# Patient Record
Sex: FEMALE | Race: WHITE | NOT HISPANIC OR LATINO | Employment: OTHER | URBAN - METROPOLITAN AREA
[De-identification: names, ages, dates, MRNs, and addresses within clinical notes are randomized per-mention and may not be internally consistent; named-entity substitution may affect disease eponyms.]

---

## 2017-03-01 ENCOUNTER — OFFICE VISIT (OUTPATIENT)
Dept: INTERNAL MEDICINE | Facility: IMAGING CENTER | Age: 72
End: 2017-03-01
Payer: COMMERCIAL

## 2017-03-01 VITALS
RESPIRATION RATE: 14 BRPM | HEIGHT: 64 IN | WEIGHT: 100 LBS | HEART RATE: 78 BPM | DIASTOLIC BLOOD PRESSURE: 60 MMHG | SYSTOLIC BLOOD PRESSURE: 90 MMHG | TEMPERATURE: 98.6 F | OXYGEN SATURATION: 95 % | BODY MASS INDEX: 17.07 KG/M2

## 2017-03-01 DIAGNOSIS — S46.912A LEFT SHOULDER STRAIN, INITIAL ENCOUNTER: Primary | ICD-10-CM

## 2017-03-01 DIAGNOSIS — R41.3 MEMORY DISORDER: ICD-10-CM

## 2017-03-01 PROCEDURE — G8482 FLU IMMUNIZE ORDER/ADMIN: HCPCS | Performed by: FAMILY MEDICINE

## 2017-03-01 PROCEDURE — G8419 CALC BMI OUT NRM PARAM NOF/U: HCPCS | Performed by: FAMILY MEDICINE

## 2017-03-01 PROCEDURE — 1036F TOBACCO NON-USER: CPT | Performed by: FAMILY MEDICINE

## 2017-03-01 PROCEDURE — G8432 DEP SCR NOT DOC, RNG: HCPCS | Performed by: FAMILY MEDICINE

## 2017-03-01 PROCEDURE — 1101F PT FALLS ASSESS-DOCD LE1/YR: CPT | Performed by: FAMILY MEDICINE

## 2017-03-01 PROCEDURE — 4040F PNEUMOC VAC/ADMIN/RCVD: CPT | Performed by: FAMILY MEDICINE

## 2017-03-01 PROCEDURE — G8598 ASA/ANTIPLAT THER USED: HCPCS | Performed by: FAMILY MEDICINE

## 2017-03-01 PROCEDURE — 3017F COLORECTAL CA SCREEN DOC REV: CPT | Performed by: FAMILY MEDICINE

## 2017-03-01 PROCEDURE — 3014F SCREEN MAMMO DOC REV: CPT | Performed by: FAMILY MEDICINE

## 2017-03-01 PROCEDURE — 99214 OFFICE O/P EST MOD 30 MIN: CPT | Performed by: FAMILY MEDICINE

## 2017-03-01 RX ORDER — MIRTAZAPINE 15 MG/1
15 TABLET, FILM COATED ORAL NIGHTLY
COMMUNITY
End: 2017-06-01

## 2017-03-01 NOTE — PATIENT INSTRUCTIONS
Current Outpatient Prescriptions Ordered in [facility]   Medication Sig Dispense Refill   • mirtazapine (REMERON) 15 MG Tab Take 15 mg by mouth every evening.     • L-Methylfolate 1 MG TABS Take 1 Tab by mouth every day.     • Cyanocobalamin (B-12) 1000 MCG TBCR Take 1 Each by mouth every day.     • aspirin EC (ECOTRIN) 81 MG TBEC Take 1 Tab by mouth every day.     • NON SPECIFIED Take 1 Each by mouth 2 Times a Day. Omega 3     • multivitamin (THERAGRAN) TABS Take 1 Tab by mouth every day. 30 Tab    • Coenzyme Q10 (CO Q-10) 100 MG CAPS Take 1 Cap by mouth every day.     • Cholecalciferol (VITAMIN D) 1000 UNIT CAPS Take 1 Cap by mouth every day.       No current [facility]-ordered facility-administered medications on file.

## 2017-03-01 NOTE — PROGRESS NOTES
SUBJECTIVE:    Chief Complaint   Patient presents with   • Shoulder Pain     left x 2 months       Shirley Coffey is a 72 y.o. female,   Established Patient     PROBLEM #1-HISTORY OF PRESENT ILLNESS  New Problem  PATIENT STATEMENT OF PROBLEM - L shoulder pain  ONSET - 2 months  COURSE - worse with overhead work and certain other movements, which will cause sharp L shoulder pain.  Pain lasts seconds.   INTENSITY/STATUS/LOCATION/RADIATION - moderate/intermittently present/ as above/ no   AGGRAVATORS - as above  RELIEVERS - rest, avoidance  TREATMENTS/COMPLIANCE/@GOAL? - none/ na/ no     PROBLEM #2-HISTORY OF PRESENT ILLNESS  Existing Problem, but requiring re-evaluation  PATIENT STATEMENT OF PROBLEM - impaired memory  ONSET - years  COURSE - this continues to be problematic. She is seeing a Neurologist who introduce PM Remeron a month or so ago.  Patient is unsure if this is helping?   INTENSITY/STATUS/LOCATION/RADIATION - moderate/ slowly worsening  AGGRAVATORS - ?  RELIEVERS - ?  TREATMENTS/COMPLIANCE/@GOAL? 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     • L-Methylfolate 1 MG TABS Take 1 Tab by mouth every day.     • Cyanocobalamin (B-12) 1000 MCG TBCR Take 1 Each by mouth every day.     • aspirin EC (ECOTRIN) 81 MG TBEC Take 1 Tab by mouth every day.     • NON SPECIFIED Take 1 Each by mouth 2 Times a Day. Omega 3     • multivitamin (THERAGRAN) TABS Take 1 Tab by mouth every day. 30 Tab    • Coenzyme Q10 (CO Q-10) 100 MG CAPS Take 1 Cap by mouth every day.     • Cholecalciferol (VITAMIN D) 1000 UNIT CAPS Take 1 Cap by mouth every day.       No facility-administered encounter medications on file as of 3/1/2017.       Social History   Substance Use Topics   • Smoking status: Never Smoker    • Smokeless tobacco: Never Used   • Alcohol Use: No       Family History   Problem Relation Age of Onset   • Dementia Father    • Cancer Sister 75     Breast Cancer       Patient's Past, Social, and Family History reviewed and updated by me in EPIC today.    REVIEW OF SYMPTOMS:               Pertinent Positives as above.    All other systems reviewed and negative.     OBJECTIVE:    BP 90/60 mmHg  Pulse 78  Temp(Src) 37 °C (98.6 °F)  Resp 14  Ht 1.626 m (5' 4.02\")  Wt 45.36 kg (100 lb)  BMI 17.16 kg/m2  SpO2 95%  Body mass index is 17.16 kg/(m^2).    Well developed, well nourished female, no acute distress, non-ill appearing. Comfortable, appears stated age, pleasant and cooperative  HEAD: atraumatic, normocephalic   EYES: Conjunctiva normal, EOMI, PERRLA, acuity grossly intact.   EARS/NOSE/THROAT: TM's normal, no SSX of infection, no perforation, no hemotympanum, acuity grossly intact. Oropharynx: benign, no lesions noted. Nares: benign.   NECK: supple, no adenopathy, no thyromegaly or nodules, no JVD, no carotid bruits.   CHEST/LUNGS: clear to auscultation and percussion bilaterally. No adventitious breath sounds.   CARDIOVASCULAR: regular " rate and rhythm, no murmur. PMI not displaced. Good central and peripheral pulses.   BACK: no CVA tenderness.   ABDOMEN: soft, non-tender, non-distended, no masses, no hepatosplenomegaly. Normal active bowel tones.   : deferred.   Rectal: deferred.   Extremities: warm/well-perfused, no cyanosis, clubbing, or edema. I was able to elicit L shoulder CC pain with anterior/superior shoulder palpation pressure and PROM movements.   SKIN: clear, unbroken, no rashes, normal turgor.   Neuro: Mental Status: Alert and Oriented x 3, but slightly slowed mentation (chronic). CN II-XII grossly intact. Gait normal. Non-focal, intact. Normal strength, sensation    ASSESSMENT:    1. Left shoulder strain, initial encounter     2. Memory disorder         PLAN:    Total Face-to-Face time spent with patient: 30 minutes  Amount of time spent counseling patient and/or coordinating care: 20 minutes    The nature of patient counseling as below:  -Patient Education, including below topics:  -Differential Diagnoses and treatment options discussed  -Risks, benefits, alternatives discussed  -Therapeutic Lifestyle Changes discussed    The nature of coordination of care as below:  -Continue (other) present chronic medications  -PHYSICAL THERAPY evaluation and treatment (Order given)  -Referrals: Continue Neurology care  -Other: none  -Seek medical attention immediately if worse    FOLLOW-UP:  For Health Care Maintenance Exams  And as needed.

## 2017-03-01 NOTE — MR AVS SNAPSHOT
"        Shirley Coffey   3/1/2017 2:30 PM   Office Visit   MRN: [id number]    Department:  [facility]   Dept Phone:  [phone]    Description:  Female : 1945   Provider:  Jesús Ramsay M.D.           Reason for Visit     Shoulder Pain left x 2 months      Allergies as of 3/1/2017     No Known Allergies      You were diagnosed with     Left shoulder strain, initial encounter   [457629]  -  Primary     Memory disorder   [[id number]]         Vital Signs     Blood Pressure Pulse Temperature Respirations Height Weight    90/60 mmHg 78 37 °C (98.6 °F) 14 1.626 m (5' 4.02\") 45.36 kg (100 lb)    Body Mass Index Oxygen Saturation Smoking Status             17.16 kg/m2 95% Never Smoker          Basic Information     Date Of Birth Sex Race Ethnicity Preferred Language    1945 Female White Non- English      Problem List              ICD-10-CM Priority Class Noted - Resolved    Dyslipidemia E78.5   2013 - Present    Macular pucker, left eye H35.372   Unknown - Present    Mixed hyperlipidemia with apolipoprotein E4 variant E78.2   Unknown - Present    Atherosclerosis of both carotid arteries I65.23   Unknown - Present    Eustachian tube dysfunction H69.80   10/28/2013 - Present    Postmenopausal disorder N95.9   10/28/2013 - Present    Vitamin D deficiency disease E55.9   10/28/2013 - Present    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 R79.89   10/31/2013 - Present    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 R79.89   10/31/2013 - Present    Inflammation of arteries (CMS-[facility]) M30.0   2013 - Present    Heterozygous MTHFR mutation C677T (CMS-[facility]) E72.12   2014 - Present    Heterozygous MTHFR mutation B8639C (CMS-[facility]) E72.12   2014 - Present    Back pain M54.9   2014 - Present    History of sciatica Z86.69   2014 - Present    Screening for breast cancer Z12.39   2014 - Present    Dementia F03.90   2015 - Present    B12 deficiency E53.8   2015 - Present    Family history of breast cancer in " sister Z80.3   5/5/2016 - Present      Health Maintenance        Date Due Completion Dates    MAMMOGRAM 6/20/2018 6/20/2016, 1/29/2015, 1/29/2015, 1/22/2015, 10/1/2012 (Prv Comp), 12/12/2005    Override on 10/1/2012: Previously completed    COLONOSCOPY 10/1/2018 10/1/2008 (Prv Comp)    Override on 10/1/2008: Previously completed    BONE DENSITY 11/6/2018 11/6/2013    IMM DTaP/Tdap/Td Vaccine (2 - Td) 10/31/2023 10/31/2013            Current Immunizations     13-VALENT PCV PREVNAR 1/23/2015    Influenza TIV (IM) 10/31/2013    Influenza Vaccine Adult HD 11/29/2016, 9/30/2015    Influenza Vaccine Quad Inj (Preserved) 12/4/2014    Pneumococcal polysaccharide vaccine (PPSV-23) 9/2/2015, 10/1/2010    SHINGLES VACCINE 10/23/2010    Tdap Vaccine 10/31/2013      Below and/or attached are the medications your provider expects you to take. Review all of your home medications and newly ordered medications with your provider and/or pharmacist. Follow medication instructions as directed by your provider and/or pharmacist. Please keep your medication list with you and share with your provider. Update the information when medications are discontinued, doses are changed, or new medications (including over-the-counter products) are added; and carry medication information at all times in the event of emergency situations     Allergies:  No Known Allergies          Medications  Valid as of: March 01, 2017 -  3:42 PM    Generic Name Brand Name Tablet Size Instructions for use    Aspirin (Tablet Delayed Response) ECOTRIN 81 MG Take 1 Tab by mouth every day.        Cholecalciferol (Cap) Vitamin D 1000 UNIT Take 1 Cap by mouth every day.        Coenzyme Q10 (Cap) Coenzyme Q10 100 MG Take 1 Cap by mouth every day.        Cyanocobalamin (Tab CR) B-12 1000 MCG Take 1 Each by mouth every day.        L-Methylfolate (Tab) L-Methylfolate 1 MG Take 1 Tab by mouth every day.        Mirtazapine (Tab) REMERON 15 MG Take 15 mg by mouth every evening.          Multiple Vitamin (Tab) THERAGRAN  Take 1 Tab by mouth every day.        NON SPECIFIED   Take 1 Each by mouth 2 Times a Day. Omega 3        .                 Medicines prescribed today were sent to:     [facility]ARMACY #[id number] - [place], [place] - [address]    [address] [place] [address]    Phone: [phone] Fax: [phone]    Open 24 Hours?: No    [facility] [place], [place] - [address] AT PORTAL TO REGISTERED [facility] SITES    [address] [facility] [address]    Phone: [phone] Fax: [phone]    Open 24 Hours?: No      Medication refill instructions:       If your prescription bottle indicates you have medication refills left, it is not necessary to call your provider’s office. Please contact your pharmacy and they will refill your medication.    If your prescription bottle indicates you do not have any refills left, you may request refills at any time through one of the following ways: The online [employer] system (except Urgent Care), by calling your provider’s office, or by asking your pharmacy to contact your provider’s office with a refill request. Medication refills are processed only during regular business hours and may not be available until the next business day. Your provider may request additional information or to have a follow-up visit with you prior to refilling your medication.   *Please Note: Medication refills are assigned a new Rx number when refilled electronically. Your pharmacy may indicate that no refills were authorized even though a new prescription for the same medication is available at the pharmacy. Please request the medicine by name with the pharmacy before contacting your provider for a refill.        Instructions    Current Outpatient Prescriptions Ordered in [facility]   Medication Sig Dispense Refill   • mirtazapine (REMERON) 15 MG Tab Take 15 mg by mouth every evening.     • L-Methylfolate 1 MG TABS Take 1 Tab by mouth every day.      • Cyanocobalamin (B-12) 1000 MCG TBCR Take 1 Each by mouth every day.     • aspirin EC (ECOTRIN) 81 MG TBEC Take 1 Tab by mouth every day.     • NON SPECIFIED Take 1 Each by mouth 2 Times a Day. Omega 3     • multivitamin (THERAGRAN) TABS Take 1 Tab by mouth every day. 30 Tab    • Coenzyme Q10 (CO Q-10) 100 MG CAPS Take 1 Cap by mouth every day.     • Cholecalciferol (VITAMIN D) 1000 UNIT CAPS Take 1 Cap by mouth every day.       No current [facility]-ordered facility-administered medications on file.           Other Notes About Your Plan     Pap- had hysterectomy was told didn't need them               MyChart Access Code: Activation code not generated  Current [employer] Status: Active

## 2017-04-11 DIAGNOSIS — I77.6 INFLAMMATION OF ARTERIES (HCC): ICD-10-CM

## 2017-04-11 DIAGNOSIS — E55.9 VITAMIN D DEFICIENCY DISEASE: ICD-10-CM

## 2017-04-11 DIAGNOSIS — E78.2 MIXED HYPERLIPIDEMIA WITH APOLIPOPROTEIN E4 VARIANT: ICD-10-CM

## 2017-04-11 DIAGNOSIS — Z00.00 HEALTH CARE MAINTENANCE: ICD-10-CM

## 2017-04-12 DIAGNOSIS — M25.512 ACUTE PAIN OF LEFT SHOULDER: ICD-10-CM

## 2017-04-19 ENCOUNTER — NON-PROVIDER VISIT (OUTPATIENT)
Dept: INTERNAL MEDICINE | Facility: IMAGING CENTER | Age: 72
End: 2017-04-19
Payer: COMMERCIAL

## 2017-04-19 ENCOUNTER — HOSPITAL ENCOUNTER (OUTPATIENT)
Facility: MEDICAL CENTER | Age: 72
End: 2017-04-19
Attending: FAMILY MEDICINE
Payer: COMMERCIAL

## 2017-04-19 DIAGNOSIS — E78.2 MIXED HYPERLIPIDEMIA WITH APOLIPOPROTEIN E4 VARIANT: ICD-10-CM

## 2017-04-19 DIAGNOSIS — Z01.89 ENCOUNTER FOR ROUTINE LABORATORY TESTING: Primary | ICD-10-CM

## 2017-04-19 DIAGNOSIS — Z00.00 HEALTH CARE MAINTENANCE: ICD-10-CM

## 2017-04-19 DIAGNOSIS — E55.9 VITAMIN D DEFICIENCY DISEASE: ICD-10-CM

## 2017-04-19 LAB
25(OH)D3 SERPL-MCNC: 22 NG/ML (ref 30–100)
ALBUMIN SERPL BCP-MCNC: 4.8 G/DL (ref 3.2–4.9)
ALBUMIN/GLOB SERPL: 1.7 G/DL
ALP SERPL-CCNC: 89 U/L (ref 30–99)
ALT SERPL-CCNC: 19 U/L (ref 2–50)
ANION GAP SERPL CALC-SCNC: 13 MMOL/L (ref 0–11.9)
APPEARANCE UR: ABNORMAL
AST SERPL-CCNC: 27 U/L (ref 12–45)
BASOPHILS # BLD AUTO: 0.9 % (ref 0–1.8)
BASOPHILS # BLD: 0.03 K/UL (ref 0–0.12)
BILIRUB SERPL-MCNC: 1 MG/DL (ref 0.1–1.5)
BILIRUB UR QL STRIP.AUTO: NEGATIVE
BUN SERPL-MCNC: 13 MG/DL (ref 8–22)
CALCIUM SERPL-MCNC: 10.8 MG/DL (ref 8.5–10.5)
CAOX CRY #/AREA URNS HPF: ABNORMAL /HPF
CHLORIDE SERPL-SCNC: 102 MMOL/L (ref 96–112)
CHOLEST SERPL-MCNC: 276 MG/DL (ref 100–199)
CK SERPL-CCNC: 58 U/L (ref 0–154)
CO2 SERPL-SCNC: 26 MMOL/L (ref 20–33)
COLOR UR: YELLOW
CREAT SERPL-MCNC: 0.79 MG/DL (ref 0.5–1.4)
CRP SERPL HS-MCNC: 0.3 MG/L (ref 0–7.5)
EOSINOPHIL # BLD AUTO: 0.04 K/UL (ref 0–0.51)
EOSINOPHIL NFR BLD: 1.2 % (ref 0–6.9)
EPI CELLS #/AREA URNS HPF: ABNORMAL /HPF
ERYTHROCYTE [DISTWIDTH] IN BLOOD BY AUTOMATED COUNT: 45.3 FL (ref 35.9–50)
GFR SERPL CREATININE-BSD FRML MDRD: >60 ML/MIN/1.73 M 2
GLOBULIN SER CALC-MCNC: 2.9 G/DL (ref 1.9–3.5)
GLUCOSE SERPL-MCNC: 81 MG/DL (ref 65–99)
GLUCOSE UR STRIP.AUTO-MCNC: NEGATIVE MG/DL
HCT VFR BLD AUTO: 46 % (ref 37–47)
HDLC SERPL-MCNC: 89 MG/DL
HGB BLD-MCNC: 15.2 G/DL (ref 12–16)
IMM GRANULOCYTES # BLD AUTO: 0 K/UL (ref 0–0.11)
IMM GRANULOCYTES NFR BLD AUTO: 0 % (ref 0–0.9)
KETONES UR STRIP.AUTO-MCNC: NEGATIVE MG/DL
LDLC SERPL CALC-MCNC: 167 MG/DL
LEUKOCYTE ESTERASE UR QL STRIP.AUTO: NEGATIVE
LYMPHOCYTES # BLD AUTO: 1.3 K/UL (ref 1–4.8)
LYMPHOCYTES NFR BLD: 39.6 % (ref 22–41)
MCH RBC QN AUTO: 31.7 PG (ref 27–33)
MCHC RBC AUTO-ENTMCNC: 33 G/DL (ref 33.6–35)
MCV RBC AUTO: 96 FL (ref 81.4–97.8)
MICRO URNS: ABNORMAL
MONOCYTES # BLD AUTO: 0.25 K/UL (ref 0–0.85)
MONOCYTES NFR BLD AUTO: 7.6 % (ref 0–13.4)
MUCOUS THREADS #/AREA URNS HPF: ABNORMAL /HPF
NEUTROPHILS # BLD AUTO: 1.66 K/UL (ref 2–7.15)
NEUTROPHILS NFR BLD: 50.7 % (ref 44–72)
NITRITE UR QL STRIP.AUTO: NEGATIVE
NRBC # BLD AUTO: 0 K/UL
NRBC BLD AUTO-RTO: 0 /100 WBC
PH UR STRIP.AUTO: 6.5 [PH]
PLATELET # BLD AUTO: 165 K/UL (ref 164–446)
PMV BLD AUTO: 9 FL (ref 9–12.9)
POTASSIUM SERPL-SCNC: 3.8 MMOL/L (ref 3.6–5.5)
PROT SERPL-MCNC: 7.7 G/DL (ref 6–8.2)
PROT UR QL STRIP: NEGATIVE MG/DL
RBC # BLD AUTO: 4.79 M/UL (ref 4.2–5.4)
RBC # URNS HPF: ABNORMAL /HPF
RBC UR QL AUTO: NEGATIVE
SODIUM SERPL-SCNC: 141 MMOL/L (ref 135–145)
SP GR UR STRIP.AUTO: 1.01
TRIGL SERPL-MCNC: 101 MG/DL (ref 0–149)
WBC # BLD AUTO: 3.3 K/UL (ref 4.8–10.8)
WBC #/AREA URNS HPF: ABNORMAL /HPF

## 2017-04-19 PROCEDURE — 81001 URINALYSIS AUTO W/SCOPE: CPT

## 2017-04-19 PROCEDURE — 80053 COMPREHEN METABOLIC PANEL: CPT

## 2017-04-19 PROCEDURE — 86141 C-REACTIVE PROTEIN HS: CPT

## 2017-04-19 PROCEDURE — 85025 COMPLETE CBC W/AUTO DIFF WBC: CPT

## 2017-04-19 PROCEDURE — 80061 LIPID PANEL: CPT

## 2017-04-19 PROCEDURE — 82306 VITAMIN D 25 HYDROXY: CPT

## 2017-04-19 PROCEDURE — 82550 ASSAY OF CK (CPK): CPT

## 2017-04-24 ENCOUNTER — TELEPHONE (OUTPATIENT)
Dept: INTERNAL MEDICINE | Facility: IMAGING CENTER | Age: 72
End: 2017-04-24

## 2017-04-24 DIAGNOSIS — E78.5 DYSLIPIDEMIA: ICD-10-CM

## 2017-04-24 RX ORDER — ROSUVASTATIN CALCIUM 20 MG/1
20 TABLET, COATED ORAL EVERY EVENING
Qty: 30 TAB | Refills: 3 | Status: SHIPPED | OUTPATIENT
Start: 2017-04-24 | End: 2017-05-15 | Stop reason: SDUPTHER

## 2017-04-24 NOTE — TELEPHONE ENCOUNTER
Spoke with patient on telephone to f/u on unread [email or web address] message I sent her last week. Reviewed all topics listed in email - pt has tolerated Crestor in the past and I will send in RX to local [facility] for her. Also recommended starting Vitamin D3 5000 IU by mouth daily. Will repeat UA/bmp/tsh at her visit on June 1.

## 2017-04-25 ENCOUNTER — HOSPITAL ENCOUNTER (OUTPATIENT)
Facility: MEDICAL CENTER | Age: 72
End: 2017-04-25
Attending: FAMILY MEDICINE
Payer: COMMERCIAL

## 2017-04-25 ENCOUNTER — NON-PROVIDER VISIT (OUTPATIENT)
Dept: INTERNAL MEDICINE | Facility: IMAGING CENTER | Age: 72
End: 2017-04-25
Payer: COMMERCIAL

## 2017-04-25 DIAGNOSIS — Z00.00 HEALTH CARE MAINTENANCE: ICD-10-CM

## 2017-04-25 DIAGNOSIS — Z01.89 ENCOUNTER FOR ROUTINE LABORATORY TESTING: Primary | ICD-10-CM

## 2017-04-25 LAB
APPEARANCE UR: CLEAR
BILIRUB UR QL STRIP.AUTO: NEGATIVE
COLOR UR: NORMAL
CULTURE IF INDICATED INDCX: NO UA CULTURE
GLUCOSE UR STRIP.AUTO-MCNC: NEGATIVE MG/DL
KETONES UR STRIP.AUTO-MCNC: NEGATIVE MG/DL
LEUKOCYTE ESTERASE UR QL STRIP.AUTO: NEGATIVE
MICRO URNS: NORMAL
NITRITE UR QL STRIP.AUTO: NEGATIVE
PH UR STRIP.AUTO: 7 [PH]
PROT UR QL STRIP: NEGATIVE MG/DL
RBC UR QL AUTO: NEGATIVE
SP GR UR STRIP.AUTO: 1.01

## 2017-04-25 PROCEDURE — 81003 URINALYSIS AUTO W/O SCOPE: CPT | Mod: GZ

## 2017-05-04 ENCOUNTER — OFFICE VISIT (OUTPATIENT)
Dept: INTERNAL MEDICINE | Facility: IMAGING CENTER | Age: 72
End: 2017-05-04
Payer: COMMERCIAL

## 2017-05-04 VITALS
DIASTOLIC BLOOD PRESSURE: 60 MMHG | BODY MASS INDEX: 17.07 KG/M2 | RESPIRATION RATE: 12 BRPM | SYSTOLIC BLOOD PRESSURE: 110 MMHG | TEMPERATURE: 97.2 F | HEART RATE: 80 BPM | HEIGHT: 64 IN | OXYGEN SATURATION: 96 % | WEIGHT: 100 LBS

## 2017-05-04 DIAGNOSIS — M79.645 FINGER PAIN, LEFT: ICD-10-CM

## 2017-05-04 PROCEDURE — 1101F PT FALLS ASSESS-DOCD LE1/YR: CPT | Performed by: FAMILY MEDICINE

## 2017-05-04 PROCEDURE — 3014F SCREEN MAMMO DOC REV: CPT | Performed by: FAMILY MEDICINE

## 2017-05-04 PROCEDURE — G8419 CALC BMI OUT NRM PARAM NOF/U: HCPCS | Performed by: FAMILY MEDICINE

## 2017-05-04 PROCEDURE — G8432 DEP SCR NOT DOC, RNG: HCPCS | Performed by: FAMILY MEDICINE

## 2017-05-04 PROCEDURE — 1036F TOBACCO NON-USER: CPT | Performed by: FAMILY MEDICINE

## 2017-05-04 PROCEDURE — 3017F COLORECTAL CA SCREEN DOC REV: CPT | Performed by: FAMILY MEDICINE

## 2017-05-04 PROCEDURE — 99213 OFFICE O/P EST LOW 20 MIN: CPT | Performed by: FAMILY MEDICINE

## 2017-05-04 PROCEDURE — G8598 ASA/ANTIPLAT THER USED: HCPCS | Performed by: FAMILY MEDICINE

## 2017-05-04 PROCEDURE — 4040F PNEUMOC VAC/ADMIN/RCVD: CPT | Performed by: FAMILY MEDICINE

## 2017-05-04 NOTE — PROGRESS NOTES
"CC: Left finger pain     HPI:  Patient is a 72 y.o. female established patient who presents today complaining of two month history of new left first finger pain described as \"zinging and electric\"/ relieved with OTC medication/ pain comes on without warning but seems to be exacerbated with cold temperatures. Pt does not notice any curling of her finger nor color changes/no swelling. She denies trauma or other associated symptoms. She recently saw Dr. Malave regarding her ongoing memory loss issues and has not tolerated at least three different medications trialed in the past. I provided her with information on the [facility] here in [place] that may have non-pharmacological alternatives for her memory issues. She denies finger pain today at our visit.     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     Medications and allergies reviewed and updated in Epic chart by me today.     ROS:  Pertinent positives listed above in HPI. All other systems have been reviewed and are negative.    PE:   /60 mmHg  Pulse 80  Temp(Src) 36.2 °C (97.2 °F)  Resp 12  Ht 1.626 m (5' 4\")  Wt 45.36 kg (100 lb)  BMI 17.16 kg/m2  SpO2 96%  Vital signs reviewed with patient.     Gen: Well developed; well nourished; no acute " distress; age appropriate appearance   CV: Regular rate and rhythm; S1/ S2 present; no murmur, gallop or rub noted  Pulm: No respiratory distress; clear to ascultation b/l; no wheezing or stridor noted b/l  Extremities: No peripheral edema b/l LE extremities/ no clubbing nor cyanosis noted/ no deformity of L hand/fingers noted/ no pain with palpation of L first finger - no associated bruising or discoloration.  Skin: Warm and dry; no rashes noted   Psych: AAOx3; mood and affect are appropriate for her diagnosis/ speech is deliberate and slow at times    A/P:  1. Finger pain, left  Suspect that the patient is having arthritis symptoms. I recommended OTC medications for comfort as needed/ avoid cold temperature exposure with hand/ follow accordingly.      Pt will be due for regular exam/ visit at the end of the summer with me/ PRN sooner if current condition changes. Encouraged her to have her  help her check into [facility] program options and maintain f/u with Dr. Malave.

## 2017-05-04 NOTE — MR AVS SNAPSHOT
"        Shirley Coffey   2017 2:00 PM   Office Visit   MRN: [id number]    Department:  [facility]   Dept Phone:  [phone]    Description:  Female : 1945   Provider:  Natacha Bangura M.D.           Allergies as of 2017     No Known Allergies      You were diagnosed with     Finger pain, left   [[id number]]         Vital Signs     Blood Pressure Pulse Temperature Respirations Height Weight    110/60 mmHg 80 36.2 °C (97.2 °F) 12 1.626 m (5' 4\") 45.36 kg (100 lb)    Body Mass Index Oxygen Saturation Smoking Status             17.16 kg/m2 96% Never Smoker          Basic Information     Date Of Birth Sex Race Ethnicity Preferred Language    1945 Female White Non- English      Your appointments     2017  8:00 AM   Established Patient with Natacha Bangura M.D.   [facility] (--)    [address] [id number] [phone]           You will be receiving a confirmation call a few days before your appointment from our automated call confirmation system.              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 R79.89   10/31/2013 - Present    Inflammation of arteries (CMS-HCC) M30.0   2013 - Present    Heterozygous MTHFR mutation C677T (CMS-HCC) E72.12   2014 - Present    Heterozygous MTHFR mutation X2936U (CMS-HCC) E72.12   2014 - Present    History of sciatica Z86.69   2014 - Present    Dementia F03.90   2015 - Present    B12 deficiency E53.8   2015 - Present    Family " history of breast cancer in sister Z80.3   5/5/2016 - Present      Health Maintenance        Date Due Completion Dates    MAMMOGRAM 6/20/2018 6/20/2016, 1/22/2015, 10/1/2012 (Prv Comp), 12/12/2005    Override on 10/1/2012: Previously completed    COLONOSCOPY 10/1/2018 10/1/2008 (Prv Comp)    Override on 10/1/2008: Previously completed    BONE DENSITY 11/6/2018 11/6/2013    IMM DTaP/Tdap/Td Vaccine (2 - Td) 10/31/2023 10/31/2013            Current Immunizations     13-VALENT PCV PREVNAR 1/23/2015    Influenza TIV (IM) 10/31/2013    Influenza Vaccine Adult HD 11/29/2016, 9/30/2015    Influenza Vaccine Quad Inj (Preserved) 12/4/2014    Pneumococcal polysaccharide vaccine (PPSV-23) 9/2/2015, 10/1/2010    SHINGLES VACCINE 10/23/2010    Tdap Vaccine 10/31/2013      Below and/or attached are the medications your provider expects you to take. Review all of your home medications and newly ordered medications with your provider and/or pharmacist. Follow medication instructions as directed by your provider and/or pharmacist. Please keep your medication list with you and share with your provider. Update the information when medications are discontinued, doses are changed, or new medications (including over-the-counter products) are added; and carry medication information at all times in the event of emergency situations     Allergies:  No Known Allergies          Medications  Valid as of: May 04, 2017 -  5:00 PM    Generic Name Brand Name Tablet Size Instructions for use    Aspirin (Tablet Delayed Response) ECOTRIN 81 MG Take 1 Tab by mouth every day.        Cholecalciferol (Cap) Vitamin D 1000 UNIT Take 1 Cap by mouth every day.        Coenzyme Q10 (Cap) Coenzyme Q10 100 MG Take 1 Cap by mouth every day.        Cyanocobalamin (Tab CR) B-12 1000 MCG Take 1 Each by mouth every day.        L-Methylfolate (Tab) L-Methylfolate 1 MG Take 1 Tab by mouth every day.        Mirtazapine (Tab) REMERON 15 MG Take 15 mg by mouth every  evening.        Multiple Vitamin (Tab) THERAGRAN  Take 1 Tab by mouth every day.        NON SPECIFIED   Take 1 Each by mouth 2 Times a Day. Omega 3        Rosuvastatin Calcium (Tab) CRESTOR 20 MG Take 1 Tab by mouth every evening.        .                 Medicines prescribed today were sent to:     [facility]/PHARMACY #[id number] - [place], [place] - [address]    [address] [place] [address]    Phone: [phone] Fax: [phone]    Open 24 Hours?: No    [facility] [place], [place] - [address] AT PORTAL TO REGISTERED [facility] SITES    [address] [facility] [address]    Phone: [phone] Fax: [phone]    Open 24 Hours?: No      Medication refill instructions:       If your prescription bottle indicates you have medication refills left, it is not necessary to call your provider’s office. Please contact your pharmacy and they will refill your medication.    If your prescription bottle indicates you do not have any refills left, you may request refills at any time through one of the following ways: The online [employer] system (except Urgent Care), by calling your provider’s office, or by asking your pharmacy to contact your provider’s office with a refill request. Medication refills are processed only during regular business hours and may not be available until the next business day. Your provider may request additional information or to have a follow-up visit with you prior to refilling your medication.   *Please Note: Medication refills are assigned a new Rx number when refilled electronically. Your pharmacy may indicate that no refills were authorized even though a new prescription for the same medication is available at the pharmacy. Please request the medicine by name with the pharmacy before contacting your provider for a refill.        Other Notes About Your Plan     Neuro: Dr. Ananda Coulter Access Code: Activation code not generated  Current [employer] Status:  Active

## 2017-05-15 DIAGNOSIS — E78.5 DYSLIPIDEMIA: ICD-10-CM

## 2017-05-15 RX ORDER — ROSUVASTATIN CALCIUM 20 MG/1
20 TABLET, COATED ORAL EVERY EVENING
Qty: 90 TAB | Refills: 0 | Status: SHIPPED | OUTPATIENT
Start: 2017-05-15 | End: 2017-06-23 | Stop reason: SDUPTHER

## 2017-06-01 ENCOUNTER — OFFICE VISIT (OUTPATIENT)
Dept: INTERNAL MEDICINE | Facility: IMAGING CENTER | Age: 72
End: 2017-06-01
Payer: COMMERCIAL

## 2017-06-01 ENCOUNTER — HOSPITAL ENCOUNTER (OUTPATIENT)
Facility: MEDICAL CENTER | Age: 72
End: 2017-06-01
Attending: FAMILY MEDICINE
Payer: COMMERCIAL

## 2017-06-01 VITALS
RESPIRATION RATE: 14 BRPM | BODY MASS INDEX: 17.07 KG/M2 | SYSTOLIC BLOOD PRESSURE: 110 MMHG | HEART RATE: 53 BPM | OXYGEN SATURATION: 97 % | WEIGHT: 100 LBS | HEIGHT: 64 IN | DIASTOLIC BLOOD PRESSURE: 60 MMHG | TEMPERATURE: 98.2 F

## 2017-06-01 DIAGNOSIS — E78.2 MIXED HYPERLIPIDEMIA WITH APOLIPOPROTEIN E4 VARIANT: ICD-10-CM

## 2017-06-01 DIAGNOSIS — Z12.31 ENCOUNTER FOR SCREENING MAMMOGRAM FOR BREAST CANCER: ICD-10-CM

## 2017-06-01 DIAGNOSIS — E55.9 VITAMIN D DEFICIENCY DISEASE: ICD-10-CM

## 2017-06-01 DIAGNOSIS — F03.90 DEMENTIA WITHOUT BEHAVIORAL DISTURBANCE, UNSPECIFIED DEMENTIA TYPE: ICD-10-CM

## 2017-06-01 LAB
ALBUMIN SERPL BCP-MCNC: 5.1 G/DL (ref 3.2–4.9)
ALBUMIN/GLOB SERPL: 1.8 G/DL
ALP SERPL-CCNC: 89 U/L (ref 30–99)
ALT SERPL-CCNC: 30 U/L (ref 2–50)
ANION GAP SERPL CALC-SCNC: 7 MMOL/L (ref 0–11.9)
AST SERPL-CCNC: 34 U/L (ref 12–45)
BILIRUB SERPL-MCNC: 0.8 MG/DL (ref 0.1–1.5)
BUN SERPL-MCNC: 9 MG/DL (ref 8–22)
CALCIUM SERPL-MCNC: 10.9 MG/DL (ref 8.5–10.5)
CHLORIDE SERPL-SCNC: 101 MMOL/L (ref 96–112)
CO2 SERPL-SCNC: 29 MMOL/L (ref 20–33)
CREAT SERPL-MCNC: 0.73 MG/DL (ref 0.5–1.4)
GFR SERPL CREATININE-BSD FRML MDRD: >60 ML/MIN/1.73 M 2
GLOBULIN SER CALC-MCNC: 2.9 G/DL (ref 1.9–3.5)
GLUCOSE SERPL-MCNC: 85 MG/DL (ref 65–99)
POTASSIUM SERPL-SCNC: 3.6 MMOL/L (ref 3.6–5.5)
PROT SERPL-MCNC: 8 G/DL (ref 6–8.2)
SODIUM SERPL-SCNC: 137 MMOL/L (ref 135–145)
TSH SERPL DL<=0.005 MIU/L-ACNC: 2.47 UIU/ML (ref 0.3–3.7)

## 2017-06-01 PROCEDURE — G8432 DEP SCR NOT DOC, RNG: HCPCS | Performed by: FAMILY MEDICINE

## 2017-06-01 PROCEDURE — 99214 OFFICE O/P EST MOD 30 MIN: CPT | Performed by: FAMILY MEDICINE

## 2017-06-01 PROCEDURE — 3014F SCREEN MAMMO DOC REV: CPT | Performed by: FAMILY MEDICINE

## 2017-06-01 PROCEDURE — 1036F TOBACCO NON-USER: CPT | Performed by: FAMILY MEDICINE

## 2017-06-01 PROCEDURE — 84443 ASSAY THYROID STIM HORMONE: CPT

## 2017-06-01 PROCEDURE — G8598 ASA/ANTIPLAT THER USED: HCPCS | Performed by: FAMILY MEDICINE

## 2017-06-01 PROCEDURE — 4040F PNEUMOC VAC/ADMIN/RCVD: CPT | Performed by: FAMILY MEDICINE

## 2017-06-01 PROCEDURE — G8419 CALC BMI OUT NRM PARAM NOF/U: HCPCS | Performed by: FAMILY MEDICINE

## 2017-06-01 PROCEDURE — 80053 COMPREHEN METABOLIC PANEL: CPT

## 2017-06-01 PROCEDURE — 1101F PT FALLS ASSESS-DOCD LE1/YR: CPT | Mod: 8P | Performed by: FAMILY MEDICINE

## 2017-06-01 PROCEDURE — 3017F COLORECTAL CA SCREEN DOC REV: CPT | Performed by: FAMILY MEDICINE

## 2017-06-01 NOTE — PROGRESS NOTES
"Chief Complaint   Patient presents with   • Annual Exam       HPI:  Patient is a 72 y.o. female established patient who presents today for her annual check up. She is doing fairly well overall despite her ongoing memory issues. She still is driving and feels safe doing that. She has seen Dr. Malave for her chronic memory issues and has tried multiple medications without improvement - currently not on memory specific medication. She was diagnosed with uncontrolled mixed hyperlipidemia and severe vitamin D deficiency in April and reports 100% compliance with nightly statin treatment and daily vitamin D replacement (dose unknown). She has discontinued multiple supplements but was reminded to continue ASA 81 mg daily. She and her  are traveling to [place] this month for a photography trip and [place] in September - I inquired about her vaccines required and she stated that her  was discussing those issues with Dr. Ramsay. She is in good spirits today and reports that her finger \"zinger\" has resolved with warmer weather.     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     Medications and allergies reviewed and updated in Epic chart by me today.     ROS:  Pertinent positives listed above in HPI. All other systems have been " "reviewed and are negative.    PE:   /60 mmHg  Pulse 53  Temp(Src) 36.8 °C (98.2 °F)  Resp 14  Ht 1.626 m (5' 4\")  Wt 45.36 kg (100 lb)  BMI 17.16 kg/m2  SpO2 97%  Vital signs reviewed with patient.     Gen: Well developed; well nourished; no acute distress; age appropriate appearance   HEENT: Normocephalic; atraumatic; PEERLA b/l; sclera clear b/l; b/l external auditory canals WNL; b/l TM WNL; oropharynx clear; oral mucosa moist; tongue midline; dentition adequate   Neck: No adenopathy; no thyromegaly  CV: Regular rate and rhythm; S1/ S2 present; no murmur, gallop or rub noted  Chest: breasts are symmetrical and no distortion of chest wall noted; No breast skin lesions, retractions, dimpling, nipple discharge, masses or tenderness with palpation b/l. No supraclavicular/ axillary lymphadenopathy noted b/l.   Pulm: No respiratory distress; clear to ascultation b/l; no wheezing or stridor noted b/l  Abd: Adequate bowel sounds noted; soft and nontender; no rebound, rigidity, nor distention  Extremities: No peripheral edema b/l LE extremities/ no clubbing nor cyanosis noted  Skin: Warm and dry; no rashes noted   Neuro: No focal deficits noted   Psych: AAOx4; mood and affect are appropriate    A/P:  1. Encounter for screening mammogram for breast cancer  Yearly mammogram due after 6/20/17. Pt provided with phone number to call and schedule it.   - MA-MAMMO SCREENING BILAT W/EDISON W/CAD; Future    2. Dementia without behavioral disturbance, unspecified dementia type (mild)  Stable/ will recheck labs today/ failed multiple drug attempts by Dr. Malave/ will follow  - TSH WITH REFLEX TO FT4; Future  - COMP METABOLIC PANEL; Future    3. Mixed hyperlipidemia with apolipoprotein E4 variant  Statin started last month/ pt to continue taking qhs statin/ will repeat fasting lipid panel in September  - LIPID PROFILE; Future    4. Vitamin D deficiency disease  Pt encouraged to make sure she is taking Vitamin D 5000 IU " daily/ will recheck vitamin D level in September   - VITAMIN D,25 HYDROXY; Future    I will f/u with pt directly regarding lab/mammogram results when available now and in September. Pt is to be seen every six months/ PRN sooner if current condition changes.

## 2017-06-01 NOTE — MR AVS SNAPSHOT
"        Shirley Coffey   2017 8:00 AM   Office Visit   MRN: [id number]    [facility]   Dept Phone:  [phone]    Description:  Female : 1945   Provider:  Natacha Bangura M.D.           Reason for Visit     Annual Exam           Allergies as of 2017     No Known Allergies      You were diagnosed with     Encounter for screening mammogram for breast cancer   [[id number]]       Dementia without behavioral disturbance, unspecified dementia type   [0745112]       Mixed hyperlipidemia with apolipoprotein E4 variant   [788836]       Vitamin D deficiency disease   [190475]         Vital Signs     Blood Pressure Pulse Temperature Respirations Height Weight    110/60 mmHg 53 36.8 °C (98.2 °F) 14 1.626 m (5' 4\") 45.36 kg (100 lb)    Body Mass Index Oxygen Saturation Smoking Status             17.16 kg/m2 97% Never Smoker          Basic Information     Date Of Birth Sex Race Ethnicity Preferred Language    1945 Female White Non- English      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 R79.89   10/31/2013 - Present    Inflammation of arteries (CMS-HCC) M30.0   2013 - Present    Heterozygous MTHFR mutation C677T (CMS-HCC) E72.12   2014 - Present    Heterozygous MTHFR mutation B1495D (CMS-HCC) E72.12   2014 - Present    History of sciatica Z86.69   2014 - Present    Dementia F03.90   2015 - Present    Family history of breast cancer in sister Z80.3   2016 - Present      Health Maintenance        Date Due Completion " Dates    MAMMOGRAM 6/20/2017 6/20/2016, 1/22/2015, 10/1/2012 (Prv Comp), 12/12/2005    Override on 10/1/2012: Previously completed    COLONOSCOPY 10/1/2018 10/1/2008 (Prv Comp)    Override on 10/1/2008: Previously completed    BONE DENSITY 11/6/2018 11/6/2013    IMM DTaP/Tdap/Td Vaccine (2 - Td) 10/31/2023 10/31/2013            Current Immunizations     13-VALENT PCV PREVNAR 1/23/2015    Influenza TIV (IM) 10/31/2013    Influenza Vaccine Adult HD 11/29/2016, 9/30/2015    Influenza Vaccine Quad Inj (Preserved) 12/4/2014    Pneumococcal polysaccharide vaccine (PPSV-23) 9/2/2015, 10/1/2010    SHINGLES VACCINE 10/23/2010    Tdap Vaccine 10/31/2013      Below and/or attached are the medications your provider expects you to take. Review all of your home medications and newly ordered medications with your provider and/or pharmacist. Follow medication instructions as directed by your provider and/or pharmacist. Please keep your medication list with you and share with your provider. Update the information when medications are discontinued, doses are changed, or new medications (including over-the-counter products) are added; and carry medication information at all times in the event of emergency situations     Allergies:  No Known Allergies          Medications  Valid as of: June 01, 2017 - 12:50 PM    Generic Name Brand Name Tablet Size Instructions for use    Aspirin (Tablet Delayed Response) ECOTRIN 81 MG Take 1 Tab by mouth every day.        Cholecalciferol (Cap) Vitamin D 1000 UNIT Take 1 Cap by mouth every day.        Multiple Vitamin (Tab) THERAGRAN  Take 1 Tab by mouth every day.        Rosuvastatin Calcium (Tab) CRESTOR 20 MG Take 1 Tab by mouth every evening.        .                 Medicines prescribed today were sent to:     [id number] - [place] - [address]    [address] [place] [address]    Phone: [phone] Fax: [phone]    Open 24 Hours?: No    [facility]  - [place], [place] - [address] AT PORTAL TO [facility]    [address] [facility] [address]    Phone: [phone] Fax: [phone]    Open 24 Hours?: No      Medication refill instructions:       If your prescription bottle indicates you have medication refills left, it is not necessary to call your provider’s office. Please contact your pharmacy and they will refill your medication.    If your prescription bottle indicates you do not have any refills left, you may request refills at any time through one of the following ways: The online [employer] system (except Urgent Care), by calling your provider’s office, or by asking your pharmacy to contact your provider’s office with a refill request. Medication refills are processed only during regular business hours and may not be available until the next business day. Your provider may request additional information or to have a follow-up visit with you prior to refilling your medication.   *Please Note: Medication refills are assigned a new Rx number when refilled electronically. Your pharmacy may indicate that no refills were authorized even though a new prescription for the same medication is available at the pharmacy. Please request the medicine by name with the pharmacy before contacting your provider for a refill.        Your To Do List     Future Labs/Procedures Complete By Expires    LIPID PROFILE  9/1/2017 (Approximate) 6/1/2018    VITAMIN D,25 HYDROXY  9/1/2017 (Approximate) 6/1/2018    COMP METABOLIC PANEL  As directed 6/1/2018    MA-MAMMO SCREENING BILAT W/EDISON W/CAD  As directed 6/1/2018    TSH WITH REFLEX TO FT4  As directed 6/1/2018      Other Notes About Your Plan     Neuro: Dr. Ananda Coulter Access Code: Activation code not generated  Current [facility] Status: Active

## 2017-06-05 ENCOUNTER — TELEPHONE (OUTPATIENT)
Dept: INTERNAL MEDICINE | Facility: IMAGING CENTER | Age: 72
End: 2017-06-05

## 2017-06-05 NOTE — TELEPHONE ENCOUNTER
Spoke with patient about discontinuing MVI due to minor elevation in calcium. Will recheck BMP at next visit - Pt also encouraged to look into [employer] programs to preserve her memory.

## 2017-06-16 DIAGNOSIS — F03.90 DEMENTIA WITHOUT BEHAVIORAL DISTURBANCE, UNSPECIFIED DEMENTIA TYPE: ICD-10-CM

## 2017-06-23 DIAGNOSIS — E78.5 DYSLIPIDEMIA: ICD-10-CM

## 2017-06-23 RX ORDER — ROSUVASTATIN CALCIUM 20 MG/1
20 TABLET, COATED ORAL EVERY EVENING
Qty: 90 TAB | Refills: 3 | Status: SHIPPED | OUTPATIENT
Start: 2017-06-23 | End: 2018-09-21 | Stop reason: SDUPTHER

## 2017-07-25 ENCOUNTER — OFFICE VISIT (OUTPATIENT)
Dept: INTERNAL MEDICINE | Facility: MEDICAL CENTER | Age: 72
End: 2017-07-25
Payer: COMMERCIAL

## 2017-07-25 VITALS
TEMPERATURE: 98 F | OXYGEN SATURATION: 96 % | BODY MASS INDEX: 16.97 KG/M2 | HEART RATE: 62 BPM | HEIGHT: 64 IN | RESPIRATION RATE: 18 BRPM | WEIGHT: 99.4 LBS | DIASTOLIC BLOOD PRESSURE: 64 MMHG | SYSTOLIC BLOOD PRESSURE: 104 MMHG

## 2017-07-25 DIAGNOSIS — Z23 NEED FOR VACCINATION: ICD-10-CM

## 2017-07-25 DIAGNOSIS — Z71.84 TRAVEL ADVICE ENCOUNTER: ICD-10-CM

## 2017-07-25 PROCEDURE — 90471 IMMUNIZATION ADMIN: CPT | Performed by: INTERNAL MEDICINE

## 2017-07-25 PROCEDURE — 99403 PREV MED CNSL INDIV APPRX 45: CPT | Mod: 25 | Performed by: INTERNAL MEDICINE

## 2017-07-25 PROCEDURE — 90691 TYPHOID VACCINE IM: CPT | Performed by: INTERNAL MEDICINE

## 2017-07-25 PROCEDURE — 90632 HEPA VACCINE ADULT IM: CPT | Performed by: INTERNAL MEDICINE

## 2017-07-25 RX ORDER — AZITHROMYCIN 250 MG/1
TABLET, FILM COATED ORAL
Qty: 12 TAB | Refills: 0 | Status: SHIPPED | OUTPATIENT
Start: 2017-07-25 | End: 2017-09-08

## 2017-07-25 RX ORDER — ATOVAQUONE AND PROGUANIL HYDROCHLORIDE 250; 100 MG/1; MG/1
1 TABLET, FILM COATED ORAL DAILY
Qty: 18 TAB | Refills: 0 | Status: SHIPPED | OUTPATIENT
Start: 2017-07-25 | End: 2017-08-12

## 2017-07-25 ASSESSMENT — PATIENT HEALTH QUESTIONNAIRE - PHQ9: CLINICAL INTERPRETATION OF PHQ2 SCORE: 0

## 2017-07-25 ASSESSMENT — PAIN SCALES - GENERAL: PAINLEVEL: NO PAIN

## 2017-07-25 NOTE — PATIENT INSTRUCTIONS
If you develop a respiratory infection, please take azithromycin at a dose of 2 tablets on day one then one tablet a day for the next 4 days

## 2017-07-25 NOTE — PROGRESS NOTES
Travel Medicine Consultation      Trip Itinerary Overview  Date of Departure: August 13, 2017    Date of Return: September 3, 2017  Type of Travel: check that which ConnectionPlus     Family      Leisure          [employer]      Geographic Location; describe trip itinerary and perform risk assesment    North American Continent               Countries to be visited:     Central American Continent:              Countries to be visited:     South American Continent               Countries to be visited:     Continent                Countries to be visited: This patient is leaving [facility] on 13 August. He and his wife will transit via [place] to [place] and then fly South to the country of [facility] and  will be in [place] for 10 day period of time from 16 August to the 27th. After that they will fly out to [place] and spend  time in [place]. While in [place], they will be visiting several national harvey to include the Located within [facility] and [facility] Area. During their [place] expeditions, both will be traveling in a protected vehicle and doing observation of  wildlife and photography during daylight hours. At the end of each day they plan to return to well developed tent accommodations that will  be enclosed  and have screening nets  that prevent them from acquiring mosquitoes. They will have modern toilet facilities and access to fresh bottled water. I do not believe the accommodation will be air-conditioned. They are not planning any nocturnal activities. There are no specific recreational activities that they will be engaged in such as high altitude trekking, river rafting swimming in any fresh water; risks to their trip to include that of traveler's diarrhea, typhoid fever and acquisition of hepatitis A infection. They are also at continuous risk of malaria while traveling in [place] [facility]; other risk related to walking in the bush specifically in the summer include that of  tick  borne fever and that of  sleeping sickness. I  do not believe they are at great risk of dengue fever due to the fact of traveling during the winter time in [place] where it is very mild and dry.    [place] / [place] / [place] / [place]                Countries to be visited:     [place]  / [place]              Countries to be visited:    [place]                Countries to be visited:    [place]                Countries to be visited:    [place]                Countries to be visited:    Patient Active Problem List    Diagnosis Date Noted   • Family history of breast cancer in sister 05/05/2016   • Dementia 09/02/2015   • History of sciatica 06/23/2014   • Heterozygous MTHFR mutation C677T (CMS-HCC) 04/30/2014   • Heterozygous MTHFR mutation V3307F (CMS-HCC) 04/30/2014   • Inflammation of arteries (CMS-HCC) 11/01/2013   • 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 10/31/2013   • 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 10/31/2013   • Postmenopausal disorder 10/28/2013   • Vitamin D deficiency disease 10/28/2013   • Macular pucker, left eye    • Mixed hyperlipidemia with apolipoprotein E4 variant    • Atherosclerosis of both carotid arteries        Social History     Social History   • Marital Status:      Spouse Name: Denny   • Number of Children: 1   • Years of Education: 18     Occupational History   • 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 None reported    Prior adverse reaction to vaccines?  Yes     /   No      Description:    Allergies to potential components of vaccines: None reported    Latex or rubber       Aluminum      Eggs      Neosporin     Gelatin     Current Outpatient Prescriptions   Medication Sig Dispense Refill   • atovaquone-proguanil  (MALARONE) 250-100 MG per tablet Take 1 Tab by mouth every day for 18 days. Take 1 tab before departing to a malaria area then daily and for 7 days post trip 18 Tab 0   • azithromycin (ZITHROMAX) 250 MG Tab Take 2 tabs daily times 3 days prn diarrhea 12 Tab 0   • rosuvastatin (CRESTOR) 20 MG Tab Take 1 Tab by mouth every evening. 90 Tab 3   • aspirin EC (ECOTRIN) 81 MG TBEC Take 1 Tab by mouth every day.     • Cholecalciferol (VITAMIN D) 1000 UNIT CAPS Take 1 Cap by mouth every day.     • multivitamin (THERAGRAN) TABS Take 1 Tab by mouth every day. 30 Tab      No current facility-administered medications for this visit.       ROS: Pertinent symptoms that are directly related to travel abroad are as noted below.    All others negative    Vaccination History     Client provided written documentation of prior vaccines?   Yes    /     No     [place] Immunization Record Reviewed?   Yes    /     No     EPIC Immunizations reviewed? Yes / Not applicable    Comments: I had the opportunity to review both the patient's immunization record on EPIC along with that of their [place] immunization online record. They have not received hepatitis A vaccine in the past so this will be administered before international travel. Administration of hepatitis B vaccine is not indicated as they will not be delivering any healthcare in the region current guidelines by the Center of disease control recommended that they receive typhoid vaccine which we will give today. In regards to general precautions for international travel, this individual has received Prevnar 13, pneumococcal vaccine, seasonal influenza vaccine, and Tdap within the last 10 year time.    If NO, check the following travel vaccines PERTINENT A FUNCTION OF ITINERARY and best  given estimate for  dates of administration:     Date Received  (month / year)    MMR         Td     Tdap     Meningococcal     Pneumococcal     Influenza     Polio     Typhoid     Typhoid (IM)      Rabies    Japanese Encephalitis    Yellow Fever     Hepatitis A     Hepatitis B     Twinrix   Trip itinerary risk assessment    x Traveler’s Diarrhea: There is increased risk of traveler's diarrhea for full-time while in [facility]. I therefore prescribed for the patient to take azithromycin 250 mg tablets a total of 2×3 days in the event they develop diarrheal illness. I also educated them on how to purchase oral rehydration salt packets and use one with a liter of water to maintain hydration in the event they get significant traveler's diarrhea.    x  Malaria: At increased risk for full-time while in [place] so the patient was prescribed Malarone total 17 tablets    x Typhoid fever: At low risk so the patient was given typhoid by intramuscular route    x Dengue and Chikungunya: Very low risk. However I did educate the patient, that if they develop a febrile illness not to use any medicine that could increased bleeding of the skin such as aspirin or nonsteroidal anti-inflammatory drug they were told to utilize acetaminophen for control of pain in fever while traveling abroad.    x  Other Tropical Diseases: Very low risk of  sleeping sickness and schistosomiasis.    x Recreational and Environmental Hazards: None apparent based upon their itinerary    Vaccines Administered at time of visit: Typhoid and hepatitis A    Prescriptions Given at time of visit: Azithromycin and Malarone    Other Travel Advice / Instructions: See below    Told to bring ORS packets for treatment of TD.   Told to use an insect repellent to prevent acquisition of other insect borne disease  Pt was given my  handout that covers a host of preventive health care issues to avoid illness while traveling abroad.    NOTE; A TOTOAL OF 45 MINUTES WAS SPENT WITH THIS CLIENT OF WHICH GREATER THAN 50% OF THE ENCOUNTER WAS USED FOR COUNSELING, ADIVICE, EDUCATION AND COORDINATION OF CARE

## 2017-07-25 NOTE — MR AVS SNAPSHOT
"        Shirley Coffey   2017 2:00 PM   Office Visit   MRN: [id number]    Department:  [facility]   Dept Phone:  [phone]    Description:  Female : 1945   Provider:  Joshua Oconnor M.D.           Reason for Visit     Travel Consult [place] and [place]      Allergies as of 2017     No Known Allergies      Vital Signs     Blood Pressure Pulse Temperature Respirations Height Weight    104/64 mmHg 62 36.7 °C (98 °F) 18 1.626 m (5' 4.02\") 45.088 kg (99 lb 6.4 oz)    Body Mass Index Oxygen Saturation Breastfeeding? 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 R79.89   10/31/2013 - Present    Inflammation of arteries (CMS-HCC) M30.0   2013 - Present    Heterozygous MTHFR mutation C677T (CMS-HCC) E72.12   2014 - Present    Heterozygous MTHFR mutation R7558Z (CMS-HCC) E72.12   2014 - Present    History of sciatica Z86.69   2014 - Present    Dementia F03.90   2015 - Present    Family history of breast cancer in sister Z80.3   2016 - Present      Health Maintenance        Date Due Completion Dates    MAMMOGRAM 2017, 2015, 10/1/2012 (Prv Comp), 2005    Override on 10/1/2012: Previously completed    IMM INFLUENZA (1) 2016, 2015, 2014, 10/31/2013    BONE DENSITY 2018    COLONOSCOPY " 6/21/2021 6/21/2011, 10/1/2008 (Prv Comp)    Override on 10/1/2008: Previously completed    IMM DTaP/Tdap/Td Vaccine (2 - Td) 10/31/2023 10/31/2013            Current Immunizations     13-VALENT PCV PREVNAR 1/23/2015    Influenza TIV (IM) 10/31/2013    Influenza Vaccine Adult HD 11/29/2016, 9/30/2015    Influenza Vaccine Quad Inj (Preserved) 12/4/2014    Pneumococcal polysaccharide vaccine (PPSV-23) 9/2/2015, 10/1/2010    SHINGLES VACCINE 10/2/2012, 10/23/2010    Tdap Vaccine 10/31/2013      Below and/or attached are the medications your provider expects you to take. Review all of your home medications and newly ordered medications with your provider and/or pharmacist. Follow medication instructions as directed by your provider and/or pharmacist. Please keep your medication list with you and share with your provider. Update the information when medications are discontinued, doses are changed, or new medications (including over-the-counter products) are added; and carry medication information at all times in the event of emergency situations     Allergies:  No Known Allergies          Medications  Valid as of: July 25, 2017 -  2:25 PM    Generic Name Brand Name Tablet Size Instructions for use    Aspirin (Tablet Delayed Response) ECOTRIN 81 MG Take 1 Tab by mouth every day.        Cholecalciferol (Cap) Vitamin D 1000 UNIT Take 1 Cap by mouth every day.        Multiple Vitamin (Tab) THERAGRAN  Take 1 Tab by mouth every day.        Rosuvastatin Calcium (Tab) CRESTOR 20 MG Take 1 Tab by mouth every evening.        .                 Medicines prescribed today were sent to:     [facility] #[id number] - [place] - [address]    [address] [place] [address]    Phone: [phone] Fax: [phone]    Open 24 Hours?: No    [facility] - [place] - [address] AT PORTAL TO REG[facility]ED [facility] S[facility]    [address] [facility] [address]    Phone: [phone] Fax: [phone]     Open 24 Hours?: No      Medication refill instructions:       If your prescription bottle indicates you have medication refills left, it is not necessary to call your provider’s office. Please contact your pharmacy and they will refill your medication.    If your prescription bottle indicates you do not have any refills left, you may request refills at any time through one of the following ways: The online [employer] system (except Urgent Care), by calling your provider’s office, or by asking your pharmacy to contact your provider’s office with a refill request. Medication refills are processed only during regular business hours and may not be available until the next business day. Your provider may request additional information or to have a follow-up visit with you prior to refilling your medication.   *Please Note: Medication refills are assigned a new Rx number when refilled electronically. Your pharmacy may indicate that no refills were authorized even though a new prescription for the same medication is available at the pharmacy. Please request the medicine by name with the pharmacy before contacting your provider for a refill.        Instructions    If you develop a respiratory infection, please take azithromycin at a dose of 2 tablets on day one then one tablet a day for the next 4 days         Other Notes About Your Plan     Neuro: Dr. Ananda Coulter Access Code: Activation code not generated  Current [employer] Status: Active

## 2017-09-08 ENCOUNTER — OFFICE VISIT (OUTPATIENT)
Dept: INTERNAL MEDICINE | Facility: IMAGING CENTER | Age: 72
End: 2017-09-08
Payer: COMMERCIAL

## 2017-09-08 VITALS
BODY MASS INDEX: 17.07 KG/M2 | DIASTOLIC BLOOD PRESSURE: 60 MMHG | TEMPERATURE: 98.1 F | HEART RATE: 70 BPM | SYSTOLIC BLOOD PRESSURE: 100 MMHG | HEIGHT: 64 IN | WEIGHT: 100 LBS | OXYGEN SATURATION: 95 % | RESPIRATION RATE: 14 BRPM

## 2017-09-08 DIAGNOSIS — F03.90 DEMENTIA WITHOUT BEHAVIORAL DISTURBANCE, UNSPECIFIED DEMENTIA TYPE: ICD-10-CM

## 2017-09-08 DIAGNOSIS — Z23 NEEDS FLU SHOT: ICD-10-CM

## 2017-09-08 DIAGNOSIS — E55.9 VITAMIN D DEFICIENCY DISEASE: ICD-10-CM

## 2017-09-08 DIAGNOSIS — Z00.00 HEALTH CARE MAINTENANCE: ICD-10-CM

## 2017-09-08 DIAGNOSIS — E83.52 HYPERCALCEMIA: ICD-10-CM

## 2017-09-08 DIAGNOSIS — E78.2 MIXED HYPERLIPIDEMIA WITH APOLIPOPROTEIN E4 VARIANT: ICD-10-CM

## 2017-09-08 PROCEDURE — 90662 IIV NO PRSV INCREASED AG IM: CPT | Performed by: FAMILY MEDICINE

## 2017-09-08 PROCEDURE — 99214 OFFICE O/P EST MOD 30 MIN: CPT | Mod: 25 | Performed by: FAMILY MEDICINE

## 2017-09-08 PROCEDURE — G0008 ADMIN INFLUENZA VIRUS VAC: HCPCS | Performed by: FAMILY MEDICINE

## 2017-09-08 NOTE — PROGRESS NOTES
Chief Complaint   Patient presents with   • Altered Mental Status       HPI:  Patient is a 72 y.o. female established patient who presents today with her  for a counseling appointment to discuss Shirley's progressive dementia issues. He first noticed her memory failing her June 2014 when she could not latch a hotel door lock properly, and patient subsequently sought out Neurological care by Dr. Malave. She received a dementia diagnosis and was trialed on numerous medications (Aricept/Remeron/Sertraline) without success. She continues to have preserved long term memory but has significant short term issues with fear of falling/ gets lost easily/ unable to do simple tasks accordingly, especially on their diverse travel experiences. They recently went to [place] on safari and she did fairly well overall per herself and her . They are appropriately requesting referral to a tertiary care center for further dementia evaluation. Pt reports sleeping well and does not use sleeping medication to aid this situation. She was due in June for her screening mammogram (forgot to schedule it) and has fasting labs pending this month. She is also interested in receiving her flu shot today.     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  "    Medications and allergies reviewed and updated in Epic chart by me today.     ROS:  Pertinent positives listed above in HPI. All other systems have been reviewed and are negative.    PE:   /60   Pulse 70   Temp 36.7 °C (98.1 °F)   Resp 14   Ht 1.626 m (5' 4.02\")   Wt 45.4 kg (100 lb)   SpO2 95%   BMI 17.16 kg/m²   Vital signs reviewed with patient.     Gen: Well developed; well nourished; no acute distress; age appropriate appearance   Neuro: No new focal deficits noted   Psych: AAOx3; mood and affect are appropriate for this patient    A/P:  1. Dementia without behavioral disturbance, unspecified dementia type  Uncontrolled; Will make tertiary care center referral to [facility].   - REFERRAL TO NEUROLOGY    2. Needs flu shot  Flu vaccine given at visit today.   - INFLUENZA VACCINE, HIGH DOSE (65+ ONLY)    3. Chronic mixed hyperlipidemia with apolipoprotein E4 variant  Stability unknown/ Pt is due for repeat fasting lipid panel this month. Pt to continue daily statin.    4. Chronic vitamin D deficiency disease  Stability unknown/ Pt is due for repeat vitamin D level this month. Pt to continue daily vitamin D supplementation.    5. Hypercalcemia  Mild elevation noted on last metabolic panel in June. Will check ionized calcium and PTH at next lab draw.  - CALCIUM IONIZED; Future  - PTH INTACT (PTH ONLY); Future    6. Health care maintenance  Marilu CONNER will assist patient with scheduling overdue screening mammogram.     Face to face time: 30 minutes with greater than 50% of time spent with direct patient contact/ medical management.  Pt will return later this month for fasting labs and will f/u on referral to [place] next week.           "

## 2017-09-11 ENCOUNTER — HOSPITAL ENCOUNTER (OUTPATIENT)
Facility: MEDICAL CENTER | Age: 72
End: 2017-09-11
Attending: FAMILY MEDICINE
Payer: COMMERCIAL

## 2017-09-11 ENCOUNTER — NON-PROVIDER VISIT (OUTPATIENT)
Dept: INTERNAL MEDICINE | Facility: IMAGING CENTER | Age: 72
End: 2017-09-11
Payer: COMMERCIAL

## 2017-09-11 DIAGNOSIS — E83.52 HYPERCALCEMIA: ICD-10-CM

## 2017-09-11 DIAGNOSIS — E55.9 VITAMIN D DEFICIENCY DISEASE: Chronic | ICD-10-CM

## 2017-09-11 DIAGNOSIS — E55.9 VITAMIN D DEFICIENCY DISEASE: ICD-10-CM

## 2017-09-11 DIAGNOSIS — E78.2 MIXED HYPERLIPIDEMIA WITH APOLIPOPROTEIN E4 VARIANT: Chronic | ICD-10-CM

## 2017-09-11 DIAGNOSIS — E78.2 MIXED HYPERLIPIDEMIA WITH APOLIPOPROTEIN E4 VARIANT: ICD-10-CM

## 2017-09-11 LAB
25(OH)D3 SERPL-MCNC: 72 NG/ML (ref 30–100)
CA-I SERPL-SCNC: 1.3 MMOL/L (ref 1.1–1.3)
CHOLEST SERPL-MCNC: 150 MG/DL (ref 100–199)
HDLC SERPL-MCNC: 76 MG/DL
LDLC SERPL CALC-MCNC: 57 MG/DL
PTH-INTACT SERPL-MCNC: 64.2 PG/ML (ref 14–72)
TRIGL SERPL-MCNC: 84 MG/DL (ref 0–149)

## 2017-09-11 PROCEDURE — 80061 LIPID PANEL: CPT

## 2017-09-11 PROCEDURE — 83970 ASSAY OF PARATHORMONE: CPT

## 2017-09-11 PROCEDURE — 82330 ASSAY OF CALCIUM: CPT

## 2017-09-11 PROCEDURE — 82306 VITAMIN D 25 HYDROXY: CPT

## 2017-09-12 ENCOUNTER — TELEPHONE (OUTPATIENT)
Dept: INTERNAL MEDICINE | Facility: IMAGING CENTER | Age: 72
End: 2017-09-12

## 2017-09-12 ENCOUNTER — OFFICE VISIT (OUTPATIENT)
Dept: INTERNAL MEDICINE | Facility: IMAGING CENTER | Age: 72
End: 2017-09-12
Payer: COMMERCIAL

## 2017-09-12 ENCOUNTER — HOSPITAL ENCOUNTER (OUTPATIENT)
Dept: RADIOLOGY | Facility: MEDICAL CENTER | Age: 72
End: 2017-09-12
Attending: FAMILY MEDICINE
Payer: COMMERCIAL

## 2017-09-12 VITALS
DIASTOLIC BLOOD PRESSURE: 60 MMHG | HEART RATE: 54 BPM | RESPIRATION RATE: 14 BRPM | WEIGHT: 100 LBS | SYSTOLIC BLOOD PRESSURE: 110 MMHG | TEMPERATURE: 98.2 F | BODY MASS INDEX: 17.07 KG/M2 | OXYGEN SATURATION: 100 % | HEIGHT: 64 IN

## 2017-09-12 DIAGNOSIS — R10.9 FLANK PAIN: ICD-10-CM

## 2017-09-12 LAB
APPEARANCE UR: NORMAL
BILIRUB UR STRIP-MCNC: NEGATIVE MG/DL
COLOR UR AUTO: YELLOW
GLUCOSE UR STRIP.AUTO-MCNC: NEGATIVE MG/DL
KETONES UR STRIP.AUTO-MCNC: NORMAL MG/DL
LEUKOCYTE ESTERASE UR QL STRIP.AUTO: NEGATIVE
NITRITE UR QL STRIP.AUTO: NEGATIVE
PH UR STRIP.AUTO: 8 [PH] (ref 5–8)
PROT UR QL STRIP: 100 MG/DL
RBC UR QL AUTO: NORMAL
SP GR UR STRIP.AUTO: 1.01
UROBILINOGEN UR STRIP-MCNC: NEGATIVE MG/DL

## 2017-09-12 PROCEDURE — 74176 CT ABD & PELVIS W/O CONTRAST: CPT

## 2017-09-12 PROCEDURE — 81002 URINALYSIS NONAUTO W/O SCOPE: CPT | Performed by: FAMILY MEDICINE

## 2017-09-12 PROCEDURE — 99213 OFFICE O/P EST LOW 20 MIN: CPT | Performed by: FAMILY MEDICINE

## 2017-09-12 NOTE — PROGRESS NOTES
"Chief Complaint   Patient presents with   • Abdominal Pain     acute onset at 1230   • Back Pain     acute onset at 1230       HPI:  Patient is a 72 y.o. female established patient who presents today for evaluation of new flank pain and periumbilical pain that started at approximately 12:30 pm today. She reports associated nausea but no vomiting, fever nor diarrhea - she does report urinary and fecal urgency with these symptoms. She took 400 mg of ibuprofen and obtained immediate relief. She and her  report that she is 100% better now at clinic. She has no history of renal stones in the past and POC UA done today showed no signs of infection but hematuria and protein.     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     Medications and allergies reviewed and updated in Epic chart by me today.     ROS:  Pertinent positives listed above in HPI. All other systems have been reviewed and are negative.    PE:   /60   Pulse (!) 54   Temp 36.8 °C (98.2 °F)   Resp 14   Ht 1.626 m (5' 4.02\")   Wt 45.4 kg (100 lb)   SpO2 100%   BMI 17.16 kg/m²   Vital signs reviewed with patient.     Gen: Well developed; well nourished; no acute distress; age appropriate appearance   CV: Regular rate and rhythm; S1/ S2 present; no murmur, gallop or rub " noted  Pulm: No respiratory distress; clear to ascultation b/l; no wheezing or stridor noted b/l  Abd: Adequate bowel sounds noted; soft and nontender; no rebound, rigidity, nor distention; no flank tenderness b/l  Extremities: No peripheral edema b/l LE extremities/ no clubbing nor cyanosis noted  Skin: Warm and dry; no rashes noted   Neuro: No focal deficits noted   Psych: AAO x 2-3 (baseline); mood and affect are appropriate for her    A/P:  1. Flank pain  I suspect patient was passing a kidney stone, given abrupt onset of symptoms, relief with ibuprofen and urine in POC UA today. Will order STAT abd/pelvis CT scan to further evaluate. Pt is clinically stable and pain free at this time.   - CT-ABDOMEN-PELVIS W/O; Future  - POCT Urinalysis    I will call patient tonight with results when available - her  is driving her out to [facility] for imaging now. Pt inadvertently forgot her scheduled mammogram appointment this morning and will reschedule.

## 2017-09-13 NOTE — TELEPHONE ENCOUNTER
I spoke with Shirley to review her CT scan results. Earlier pain episode likely due to 2 mm calcification noted in R distal ureter but no evidence of hydronephrosis/acute obstruction. She is fully recovered this evening and will let me know if she experiences another event.

## 2017-09-28 ENCOUNTER — TELEPHONE (OUTPATIENT)
Dept: INTERNAL MEDICINE | Facility: IMAGING CENTER | Age: 72
End: 2017-09-28

## 2017-09-28 NOTE — TELEPHONE ENCOUNTER
Right flank and minerva umbilical pain returned abruptly at 10:30AM today.  Patient is unable to control her pain at home.  Probable renal calculus seen on CT 9/12/2017.  Per Dr Bangura - go to ER for IV fluids, pain control, additional imaging, etc. This was discussed with patient's .

## 2017-10-02 ENCOUNTER — HOSPITAL ENCOUNTER (OUTPATIENT)
Dept: RADIOLOGY | Facility: MEDICAL CENTER | Age: 72
End: 2017-10-02
Attending: FAMILY MEDICINE
Payer: COMMERCIAL

## 2017-10-02 DIAGNOSIS — Z12.31 ENCOUNTER FOR SCREENING MAMMOGRAM FOR BREAST CANCER: ICD-10-CM

## 2017-10-02 PROCEDURE — G0202 SCR MAMMO BI INCL CAD: HCPCS

## 2018-01-06 DIAGNOSIS — E78.5 DYSLIPIDEMIA: ICD-10-CM

## 2018-01-08 RX ORDER — ROSUVASTATIN CALCIUM 20 MG/1
20 TABLET, COATED ORAL EVERY EVENING
Qty: 90 TAB | Refills: 3 | Status: SHIPPED | OUTPATIENT
Start: 2018-01-08 | End: 2018-02-22

## 2018-02-22 ENCOUNTER — OFFICE VISIT (OUTPATIENT)
Dept: INTERNAL MEDICINE | Facility: IMAGING CENTER | Age: 73
End: 2018-02-22
Payer: COMMERCIAL

## 2018-02-22 VITALS
OXYGEN SATURATION: 94 % | BODY MASS INDEX: 17.07 KG/M2 | DIASTOLIC BLOOD PRESSURE: 60 MMHG | HEART RATE: 66 BPM | WEIGHT: 100 LBS | TEMPERATURE: 99.3 F | RESPIRATION RATE: 14 BRPM | HEIGHT: 64 IN | SYSTOLIC BLOOD PRESSURE: 100 MMHG

## 2018-02-22 DIAGNOSIS — F03.90 DEMENTIA WITHOUT BEHAVIORAL DISTURBANCE, UNSPECIFIED DEMENTIA TYPE: Chronic | ICD-10-CM

## 2018-02-22 DIAGNOSIS — M79.671 RIGHT FOOT PAIN: ICD-10-CM

## 2018-02-22 PROCEDURE — 99215 OFFICE O/P EST HI 40 MIN: CPT | Performed by: FAMILY MEDICINE

## 2018-02-22 NOTE — PROGRESS NOTES
Chief Complaint   Patient presents with   • Dementia       HPI:  Patient is a 73 y.o. female established patient who presents today alone to discuss her visit to [facility] last month for tertiary dementia evaluation. I have reviewed the notes from her January visit and Shirley has a list of items with her today from the visit. It has been one month since her evaluation, and unfortunately Shirley has not accomplished one task on the list. She is interested in obtaining the majority of her neurological care here in [place] due to stress involved with travel to [place]. She is willing to undergo LP, driving exam, home safety exam, and Mediterranean diet changes suggested by [facility] team. She does not exercise regularly due to new R lateral foot pain and would like to solve this issue (she is unable to tell me how long foot pain has been present. She also does not engage in daily socialization, does not build puzzles, or do any type of word puzzles/games since she retired. I asked her about her ability to do simple tasks at home and about driving - she reports trouble with turning on correct electric burner for stove but denies getting lost while driving. She avoids driving on freeway and stays in close radius around her home for errands. She denies any recent tickets or car accidents and is willing to undergo driving exam recommended by [facility] team. She otto changes in her other chronic medical issues and will be due for annual fasting labs in June 2018.     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     Medications and allergies reviewed and updated in Epic chart by me today.     Extensive record review done at visit today.     ROS:  Pertinent positives listed above in HPI. All other systems have been reviewed and are negative.    PE:   /60   Pulse 66   Temp 37.4 °C (99.3 °F)   Resp 14   Ht 1.626 m (5' 4.02\")   Wt 45.4 kg (100 lb)   SpO2 94%   BMI 17.16 kg/m²   Vital signs reviewed with patient.     Gen: Well developed; very thin; no acute distress; age appropriate appearance   HEENT: Normocephalic; atraumatic; PEERLA b/l; sclera clear b/l; b/l external auditory canals WNL; b/l TM WNL; nares patent b/l; oropharynx clear; oral mucosa moist; tongue midline; dentition adequate   Neck: No adenopathy; no thyromegaly  CV: Regular rate and rhythm; S1/ S2 present; no murmur, gallop or rub noted  Pulm: No respiratory distress; clear to ascultation b/l; no wheezing or stridor noted b/l  Abd: Adequate bowel sounds noted; soft and nontender; no rebound, rigidity, nor distention   Extremities: No peripheral edema b/l LE extremities/ no clubbing nor cyanosis noted; painful skin prominence noted on lateral aspect of R foot c/w corn/ callous  Skin: Warm and dry; no rashes noted   Neuro: No focal deficits noted   Psych: AAOx2; mood and affect are flat    A/P:  1. Chronic dementia without behavioral disturbance, unspecified dementia type  Notes from [facility] the [facility] visit 1/22/18 reviewed at length with patient and Marilu CONNER. I wrote detailed list of items for patient today: she is to call [facility] to make appointment with Dr. Malave to continue local Neurology care and obtain LP recommended/ " have any brain imaging from [facility] sent to [place]. Will place referral for OT driving evaluation and home safety evaluation for patient. Marilu CONNER will facilitate obtaining CD with prior brain MRI from [facility] for her file at [place]. Also encouraged patient to exercise daily as able, do daily puzzles or reading to engage her brain, and have frequent social encounters.   - OT DRIVING EVAL  - OT HOME EVALUATION    2. Right foot pain  Pain appears to be coming from callous/corn on lateral aspect of R foot. I will refer her to Dr. Null for evaluation so she can resume daily exercise as tolerated.   - REFERRAL TO PODIATRY    Face to face time: 45 minutes with greater than 50% of time spent with direct patient contact and medical management.   Pt will be due for fasting labs this summer and can call our office at any time if she has changes or difficulties with tasks detailed above.

## 2018-02-26 ENCOUNTER — HOME HEALTH ADMISSION (OUTPATIENT)
Dept: HOME HEALTH SERVICES | Facility: HOME HEALTHCARE | Age: 73
End: 2018-02-26
Payer: COMMERCIAL

## 2018-02-26 DIAGNOSIS — F03.90 DEMENTIA WITHOUT BEHAVIORAL DISTURBANCE, UNSPECIFIED DEMENTIA TYPE: Chronic | ICD-10-CM

## 2018-02-27 DIAGNOSIS — F03.90 DEMENTIA WITHOUT BEHAVIORAL DISTURBANCE, UNSPECIFIED DEMENTIA TYPE: Chronic | ICD-10-CM

## 2018-06-05 ENCOUNTER — OFFICE VISIT (OUTPATIENT)
Dept: INTERNAL MEDICINE | Facility: IMAGING CENTER | Age: 73
End: 2018-06-05
Payer: COMMERCIAL

## 2018-06-05 VITALS
HEART RATE: 64 BPM | HEIGHT: 64 IN | TEMPERATURE: 99 F | DIASTOLIC BLOOD PRESSURE: 58 MMHG | BODY MASS INDEX: 16.9 KG/M2 | SYSTOLIC BLOOD PRESSURE: 92 MMHG | OXYGEN SATURATION: 96 % | RESPIRATION RATE: 14 BRPM | WEIGHT: 99 LBS

## 2018-06-05 DIAGNOSIS — E55.9 VITAMIN D DEFICIENCY DISEASE: Chronic | ICD-10-CM

## 2018-06-05 DIAGNOSIS — Z00.00 HEALTH CARE MAINTENANCE: ICD-10-CM

## 2018-06-05 DIAGNOSIS — E78.2 MIXED HYPERLIPIDEMIA WITH APOLIPOPROTEIN E4 VARIANT: Chronic | ICD-10-CM

## 2018-06-05 DIAGNOSIS — F03.90 DEMENTIA WITHOUT BEHAVIORAL DISTURBANCE, UNSPECIFIED DEMENTIA TYPE: Chronic | ICD-10-CM

## 2018-06-05 PROCEDURE — 99214 OFFICE O/P EST MOD 30 MIN: CPT | Performed by: FAMILY MEDICINE

## 2018-06-05 NOTE — PROGRESS NOTES
"Chief Complaint   Patient presents with   • Dementia       HPI:  Patient is a 73 y.o. female established patient who presents today with her  for evaluation of recent visits to [facility], [facility], and Dr. Malave in regards to her ongoing dementia diagnosis. All of these evaluations have been reviewed by me and the patient during her last visit, and today she reports that her overall condition has not changed much. She continues to exercise 40-45 minutes per day, eats mainly vegan diet, and travels frequently with her . She continues to drive in the local area and has not had formal driving evaluation, despite the recommendations of all three consultants above. Both her  and me support moving ahead with her driving evaluation today with the premise of keeping Shirley and others around her safe. She denies recent falls nor injuries and is due for annual fasting labs. She is in good spirits today at our visit and denies any new complaints, other than her vision \"is not as sharp as it once was.\" I encouraged her to make appointment with her eye doctor for an eye exam this summer to address this issue.    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     Medications and allergies reviewed and updated in Epic chart by me today.     ROS:  Pertinent positives " "listed above in HPI. All other systems have been reviewed and are negative.    PE:   BP (!) 92/58   Pulse 64   Temp 37.2 °C (99 °F)   Resp 14   Ht 1.626 m (5' 4.02\")   Wt 44.9 kg (99 lb)   SpO2 96%   BMI 16.98 kg/m²   Vital signs reviewed with patient.     Gen: Well developed; well nourished; no acute distress; non toxic appearance; wears glasses   HEENT: Normocephalic; atraumatic; PEERLA b/l; sclera clear b/l; b/l external auditory canals WNL; b/l TM WNL; nares patent; oropharynx clear; oral mucosa moist; tongue midline; dentition adequate   Neck: No adenopathy; no thyromegaly  CV: Regular rate and rhythm; S1/ S2 present; no murmur, gallop or rub noted  Pulm: No respiratory distress; clear to ascultation b/l; no wheezing or stridor noted b/l  Abd: Adequate bowel sounds noted; soft and nontender; no rebound, rigidity, nor distention  Extremities: No peripheral edema b/l LE extremities/ no clubbing nor cyanosis noted  Skin: Warm and dry; no rashes noted   Neuro: No new focal deficits noted; stable gait  Psych: AAOx2-3; mood and affect are at baseline for patient    A/P:  1. Chronic vitamin D deficiency disease  Stable/ pt is to to continue daily vitamin D supplementation and is due for vitamin D level.   - VITAMIN D,25 HYDROXY; Future    2. Chronic mixed hyperlipidemia with apolipoprotein E4 variant  Stable/ pt is to continue daily crestor and is due for fasting lipid panel   - LIPID PROFILE; Future    3. Chronic dementia without behavioral disturbance, unspecified dementia type  Ongoing issue for patient/ agree with consultants at [facility], [facility] and Dr. Malave that patient needs driving evaluation ASAP. Will have her connect with [facility] outpatient OT department for this evaluation. Also agree that she should continue daily socialization, daily exercise, and ensure she is getting enough calcium supplementation (target 1200 mg per day) due to vegan diet choices. Due for folate and " vitamin B12 levels. Unfortunately there is no proven way to halt this progression of this terrible disease state at this time, and patient/her  are acutely aware of this reality. I encouraged them both to live life to the fullest!  - VITAMIN B12; Future  - FOLATE; Future    4. Health care maintenance  Due for fasting annual labs.   - CBC WITH DIFFERENTIAL; Future  - COMP METABOLIC PANEL; Future  - TSH WITH REFLEX TO FT4; Future     Pt is to return in the near future for fasting lab draw with Marilu CONNER for further care planning.

## 2018-06-07 ENCOUNTER — HOSPITAL ENCOUNTER (OUTPATIENT)
Facility: MEDICAL CENTER | Age: 73
End: 2018-06-07
Attending: FAMILY MEDICINE
Payer: COMMERCIAL

## 2018-06-07 ENCOUNTER — NON-PROVIDER VISIT (OUTPATIENT)
Dept: INTERNAL MEDICINE | Facility: IMAGING CENTER | Age: 73
End: 2018-06-07
Payer: COMMERCIAL

## 2018-06-07 DIAGNOSIS — Z01.89 ENCOUNTER FOR ROUTINE LABORATORY TESTING: ICD-10-CM

## 2018-06-07 DIAGNOSIS — F03.90 DEMENTIA WITHOUT BEHAVIORAL DISTURBANCE, UNSPECIFIED DEMENTIA TYPE: Chronic | ICD-10-CM

## 2018-06-07 DIAGNOSIS — E78.2 MIXED HYPERLIPIDEMIA WITH APOLIPOPROTEIN E4 VARIANT: Chronic | ICD-10-CM

## 2018-06-07 DIAGNOSIS — E55.9 VITAMIN D DEFICIENCY DISEASE: Chronic | ICD-10-CM

## 2018-06-07 DIAGNOSIS — Z00.00 HEALTH CARE MAINTENANCE: ICD-10-CM

## 2018-06-07 LAB
25(OH)D3 SERPL-MCNC: 40 NG/ML (ref 30–100)
ALBUMIN SERPL BCP-MCNC: 4.7 G/DL (ref 3.2–4.9)
ALBUMIN/GLOB SERPL: 1.7 G/DL
ALP SERPL-CCNC: 94 U/L (ref 30–99)
ALT SERPL-CCNC: 36 U/L (ref 2–50)
ANION GAP SERPL CALC-SCNC: 7 MMOL/L (ref 0–11.9)
AST SERPL-CCNC: 41 U/L (ref 12–45)
BASOPHILS # BLD AUTO: 0.8 % (ref 0–1.8)
BASOPHILS # BLD: 0.03 K/UL (ref 0–0.12)
BILIRUB SERPL-MCNC: 1 MG/DL (ref 0.1–1.5)
BUN SERPL-MCNC: 11 MG/DL (ref 8–22)
CALCIUM SERPL-MCNC: 10.3 MG/DL (ref 8.5–10.5)
CHLORIDE SERPL-SCNC: 104 MMOL/L (ref 96–112)
CHOLEST SERPL-MCNC: 162 MG/DL (ref 100–199)
CO2 SERPL-SCNC: 29 MMOL/L (ref 20–33)
CREAT SERPL-MCNC: 0.73 MG/DL (ref 0.5–1.4)
EOSINOPHIL # BLD AUTO: 0.12 K/UL (ref 0–0.51)
EOSINOPHIL NFR BLD: 3.1 % (ref 0–6.9)
ERYTHROCYTE [DISTWIDTH] IN BLOOD BY AUTOMATED COUNT: 45.1 FL (ref 35.9–50)
FOLATE SERPL-MCNC: 21.6 NG/ML
GLOBULIN SER CALC-MCNC: 2.7 G/DL (ref 1.9–3.5)
GLUCOSE SERPL-MCNC: 82 MG/DL (ref 65–99)
HCT VFR BLD AUTO: 46.5 % (ref 37–47)
HDLC SERPL-MCNC: 86 MG/DL
HGB BLD-MCNC: 15.3 G/DL (ref 12–16)
IMM GRANULOCYTES # BLD AUTO: 0 K/UL (ref 0–0.11)
IMM GRANULOCYTES NFR BLD AUTO: 0 % (ref 0–0.9)
LDLC SERPL CALC-MCNC: 55 MG/DL
LYMPHOCYTES # BLD AUTO: 1.33 K/UL (ref 1–4.8)
LYMPHOCYTES NFR BLD: 34.5 % (ref 22–41)
MCH RBC QN AUTO: 31.9 PG (ref 27–33)
MCHC RBC AUTO-ENTMCNC: 32.9 G/DL (ref 33.6–35)
MCV RBC AUTO: 97.1 FL (ref 81.4–97.8)
MONOCYTES # BLD AUTO: 0.33 K/UL (ref 0–0.85)
MONOCYTES NFR BLD AUTO: 8.6 % (ref 0–13.4)
NEUTROPHILS # BLD AUTO: 2.04 K/UL (ref 2–7.15)
NEUTROPHILS NFR BLD: 53 % (ref 44–72)
NRBC # BLD AUTO: 0 K/UL
NRBC BLD-RTO: 0 /100 WBC
PLATELET # BLD AUTO: 113 K/UL (ref 164–446)
PMV BLD AUTO: 9.2 FL (ref 9–12.9)
POTASSIUM SERPL-SCNC: 3.8 MMOL/L (ref 3.6–5.5)
PROT SERPL-MCNC: 7.4 G/DL (ref 6–8.2)
RBC # BLD AUTO: 4.79 M/UL (ref 4.2–5.4)
SODIUM SERPL-SCNC: 140 MMOL/L (ref 135–145)
TRIGL SERPL-MCNC: 103 MG/DL (ref 0–149)
TSH SERPL DL<=0.005 MIU/L-ACNC: 3.68 UIU/ML (ref 0.38–5.33)
VIT B12 SERPL-MCNC: 425 PG/ML (ref 211–911)
WBC # BLD AUTO: 3.9 K/UL (ref 4.8–10.8)

## 2018-06-07 PROCEDURE — 85025 COMPLETE CBC W/AUTO DIFF WBC: CPT

## 2018-06-07 PROCEDURE — 82746 ASSAY OF FOLIC ACID SERUM: CPT

## 2018-06-07 PROCEDURE — 82607 VITAMIN B-12: CPT

## 2018-06-07 PROCEDURE — 84443 ASSAY THYROID STIM HORMONE: CPT

## 2018-06-07 PROCEDURE — 80061 LIPID PANEL: CPT

## 2018-06-07 PROCEDURE — 80053 COMPREHEN METABOLIC PANEL: CPT

## 2018-06-07 PROCEDURE — 82306 VITAMIN D 25 HYDROXY: CPT

## 2018-07-06 ENCOUNTER — OCCUPATIONAL THERAPY (OUTPATIENT)
Dept: OCCUPATIONAL THERAPY | Facility: REHABILITATION | Age: 73
End: 2018-07-06
Attending: FAMILY MEDICINE
Payer: COMMERCIAL

## 2018-07-06 DIAGNOSIS — F03.90 DEMENTIA WITHOUT BEHAVIORAL DISTURBANCE, UNSPECIFIED DEMENTIA TYPE: ICD-10-CM

## 2018-07-06 PROCEDURE — G8990 OTHER PT/OT CURRENT STATUS: HCPCS | Mod: CL

## 2018-07-06 PROCEDURE — G8991 OTHER PT/OT GOAL STATUS: HCPCS | Mod: CL

## 2018-07-06 PROCEDURE — G8992 OTHER PT/OT  D/C STATUS: HCPCS | Mod: CL

## 2018-07-06 PROCEDURE — 97750 PHYSICAL PERFORMANCE TEST: CPT

## 2018-07-06 ASSESSMENT — BALANCE ASSESSMENTS
BALANCE - SITTING DYNAMIC: GOOD
BALANCE - STANDING DYNAMIC: FAIR +
BALANCE - SITTING STATIC: GOOD
BALANCE - STANDING STATIC: FAIR +
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
## 2018-09-21 DIAGNOSIS — E78.5 DYSLIPIDEMIA: ICD-10-CM

## 2018-09-21 RX ORDER — ROSUVASTATIN CALCIUM 20 MG/1
20 TABLET, COATED ORAL EVERY EVENING
Qty: 90 TAB | Refills: 3 | Status: SHIPPED | OUTPATIENT
Start: 2018-09-21 | End: 2019-10-15 | Stop reason: SDUPTHER

## 2018-10-04 DIAGNOSIS — M85.9 DISORDER OF BONE DENSITY AND STRUCTURE, UNSPECIFIED: ICD-10-CM

## 2018-10-04 DIAGNOSIS — Z12.31 ENCOUNTER FOR SCREENING MAMMOGRAM FOR BREAST CANCER: ICD-10-CM

## 2018-10-04 DIAGNOSIS — E28.39 ESTROGEN DEFICIENCY: ICD-10-CM

## 2018-10-23 ENCOUNTER — HOSPITAL ENCOUNTER (OUTPATIENT)
Dept: RADIOLOGY | Facility: MEDICAL CENTER | Age: 73
End: 2018-10-23
Attending: FAMILY MEDICINE
Payer: COMMERCIAL

## 2018-10-23 DIAGNOSIS — E28.39 ESTROGEN DEFICIENCY: ICD-10-CM

## 2018-10-23 DIAGNOSIS — Z12.31 ENCOUNTER FOR SCREENING MAMMOGRAM FOR BREAST CANCER: ICD-10-CM

## 2018-10-23 DIAGNOSIS — M85.9 DISORDER OF BONE DENSITY AND STRUCTURE, UNSPECIFIED: ICD-10-CM

## 2018-10-23 PROCEDURE — 77067 SCR MAMMO BI INCL CAD: CPT

## 2018-10-23 PROCEDURE — 77080 DXA BONE DENSITY AXIAL: CPT

## 2018-10-30 ENCOUNTER — TELEPHONE (OUTPATIENT)
Dept: INTERNAL MEDICINE | Facility: IMAGING CENTER | Age: 73
End: 2018-10-30

## 2018-11-01 ENCOUNTER — OFFICE VISIT (OUTPATIENT)
Dept: INTERNAL MEDICINE | Facility: IMAGING CENTER | Age: 73
End: 2018-11-01
Payer: COMMERCIAL

## 2018-11-01 DIAGNOSIS — M85.89 OSTEOPENIA OF MULTIPLE SITES: ICD-10-CM

## 2018-11-01 DIAGNOSIS — Z71.85 VACCINE COUNSELING: ICD-10-CM

## 2018-11-01 PROCEDURE — 99214 OFFICE O/P EST MOD 30 MIN: CPT | Performed by: FAMILY MEDICINE

## 2018-11-01 RX ORDER — RISEDRONATE SODIUM 150 MG/1
150 TABLET, FILM COATED ORAL
Qty: 12 TAB | Refills: 1 | Status: SHIPPED | OUTPATIENT
Start: 2018-11-01 | End: 2019-10-15 | Stop reason: SDUPTHER

## 2018-11-01 NOTE — PROGRESS NOTES
Chief Complaint   Patient presents with   • Dexa (Dxa) Consult       HPI:  Patient is a 73 y.o. female established patient who presents today with her  to discuss dexa scan results from 10/23/18. When compared to dexa scan done 11/6/13, there has been a 16.4% bone density decrease in lumbar spine and 19.6% bone density decrease of left femur, despite the patient doing daily weight bearing exercise and taking adequate daily calcium and vitamin D. She does not have a history of fracture but certainly is at higher risk due to small bone structure and advancing age. Her  takes Actonel once per month without issue and is well versed in safe use of this medication. They continue to travel extensively and take photographs of wildlife.     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     Medications and allergies reviewed and updated in Epic chart by me today.     Dexa scan results 10/23/18 reviewed in detail with patient and  at visit today.     ROS:  Pertinent positives listed above in HPI. All other systems have been reviewed and are negative.    PE:   Gen: Well developed; well nourished; no acute distress; age appropriate appearance   Skin: Warm and dry; no rashes noted   Neuro: No focal deficits noted   Psych: AAOx3; mood and affect are  baseline for patient    A/P:  1. Osteopenia of multiple sites  Worsening; care plan detailed with patient and  and we agreed that she will take actonel monthly - they will take medication together on same day each month to ensure compliance. New RX sent to local pharmacy per their request. Will repeat dexa scan in 2 years.   - Risedronate Sodium 150 MG Tab; Take 1 Tab by mouth Q30 DAYS.  Dispense: 12 Tab; Refill: 1    2. Vaccine counseling  Pt is going to [facility] to obtain her second Shingrix vaccine this afternoon and is otherwise UTD at this time.    Face to face time: 30 minutes spent with greater than 50% of time spent with direct patient care and counseling about diagnosis, prognosis, risk versus benefits of treatment, and importance of compliance with instructions. All questions answered and support provided during visit today.   Pt encouraged to keep physically active as well as continue to engage in puzzles, hobbies, and socialization to help delay progression of her chronic dementia. She is to contact our office if current condition changes PRN.

## 2018-11-19 ENCOUNTER — OFFICE VISIT (OUTPATIENT)
Dept: INTERNAL MEDICINE | Facility: IMAGING CENTER | Age: 73
End: 2018-11-19
Payer: COMMERCIAL

## 2018-11-19 VITALS
OXYGEN SATURATION: 97 % | HEIGHT: 64 IN | RESPIRATION RATE: 14 BRPM | DIASTOLIC BLOOD PRESSURE: 60 MMHG | TEMPERATURE: 98 F | BODY MASS INDEX: 16.9 KG/M2 | HEART RATE: 61 BPM | SYSTOLIC BLOOD PRESSURE: 120 MMHG | WEIGHT: 99 LBS

## 2018-11-19 DIAGNOSIS — F03.90 DEMENTIA WITHOUT BEHAVIORAL DISTURBANCE, UNSPECIFIED DEMENTIA TYPE: Chronic | ICD-10-CM

## 2018-11-19 DIAGNOSIS — Z15.89 HETEROZYGOUS MTHFR MUTATION C677T: ICD-10-CM

## 2018-11-19 DIAGNOSIS — Z15.89 HETEROZYGOUS MTHFR MUTATION A1298C: ICD-10-CM

## 2018-11-19 PROCEDURE — 99214 OFFICE O/P EST MOD 30 MIN: CPT | Performed by: FAMILY MEDICINE

## 2018-11-19 RX ORDER — LEVOMEFOLATE CALCIUM 15 MG
1 TABLET ORAL DAILY
Qty: 90 TAB | Refills: 4 | Status: SHIPPED | OUTPATIENT
Start: 2018-11-19 | End: 2019-10-14

## 2018-11-19 ASSESSMENT — PATIENT HEALTH QUESTIONNAIRE - PHQ9: CLINICAL INTERPRETATION OF PHQ2 SCORE: 0

## 2018-11-19 NOTE — PROGRESS NOTES
"Chief Complaint   Patient presents with   • Dementia       HPI:  Patient is a 73 y.o. female established patient who presents today with  to discuss further strategies for her chronic dementia. We reviewed strategies at visit on 11/1/18 that doing puzzles, maintaining hobbies including wild bird photography and [employer] bible study, and ongoing socialization to help delay progression of her chronic dementia. She returns today seeking \"somthing that does not exist\" she states - she is seeking a definitive \"stop\" to her dementia symptoms (mild but present at this time). She endorses that she likes Dr. Malave but feels he does not have definitive \"stop\" treatment for her dementia but also acknowledges that he likely does not have something to help her. One of the main issues is that her  is not fully accepting of her dementia diagnosis and self admittedly is less patient with her at times. Pt no longer drives and is reliant on him for all transportation needs. They currently do not participate in any support groups and are traveling soon out of state to photograph a bird migration. They had a recent home visit with insurance company PA-C for overall health wellness, and she mentioned that L-methyl folate use \"might\" help her cognitive function.     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     Medications and allergies reviewed and updated in Epic chart by me today.     ROS:  Pertinent positives listed above in HPI. All other systems have been reviewed and are negative.    PE:   /60 (BP Location: Left arm, Patient Position: Sitting, BP Cuff Size: Adult)   Pulse 61   Temp 36.7 °C (98 °F) (Temporal)   Resp 14   Ht 1.626 m (5' 4\")   Wt 44.9 kg (99 lb)   SpO2 97%   BMI 16.99 kg/m²   Vital signs reviewed with patient.     Gen: Well developed; well nourished; no acute distress; age appropriate appearance   Skin: Warm and dry; no rashes noted   Neuro: No focal deficits noted   Psych: AAOx2-3; mood and affect are baseline    A/P:  1. Heterozygous MTHFR mutation H7978A (HCC)/ Heterozygous MTHFR mutation C677T (HCC)  We discussed that there are no drawbacks to taking L-methylfolate supplementation daily. Pt is interested and new RX sent to pharmacy.   - L-Methylfolate 15 MG Tab; Take 1 Tab by mouth every day.  Dispense: 90 Tab; Refill: 4    3. Dementia without behavioral disturbance, unspecified dementia type  Stable/ Pt has had extensive work up at [facility] and has local care through Dr. Malave and [facility]. We are all in agreement that there is no magic pill to take to halt this terrible disease but I urged them to participate in the local dementia support groups for help with day to day issues. Pt is to continue her current follow up appointments with neuro team.  - L-Methylfolate 15 MG Tab; Take 1 Tab by mouth every day.  Dispense: 90 Tab; Refill: 4     Face to face time: 30 minutes spent with greater than 75% of time spent with direct patient care and counseling about diagnosis, prognosis, risk versus benefits of treatment, and importance of compliance with instructions. All questions answered and support provided during visit today.   I will contact patient and  with support group information in near " future.

## 2019-01-25 ENCOUNTER — OFFICE VISIT (OUTPATIENT)
Dept: INTERNAL MEDICINE | Facility: IMAGING CENTER | Age: 74
End: 2019-01-25
Payer: COMMERCIAL

## 2019-01-25 DIAGNOSIS — H35.372 MACULAR PUCKER, LEFT EYE: Chronic | ICD-10-CM

## 2019-01-25 DIAGNOSIS — F03.90 DEMENTIA WITHOUT BEHAVIORAL DISTURBANCE, UNSPECIFIED DEMENTIA TYPE: Chronic | ICD-10-CM

## 2019-01-25 DIAGNOSIS — Z15.89 HETEROZYGOUS MTHFR MUTATION C677T: ICD-10-CM

## 2019-01-25 DIAGNOSIS — Z15.89 HETEROZYGOUS MTHFR MUTATION A1298C: ICD-10-CM

## 2019-01-25 DIAGNOSIS — E78.2 MIXED HYPERLIPIDEMIA WITH APOLIPOPROTEIN E4 VARIANT: Chronic | ICD-10-CM

## 2019-01-25 DIAGNOSIS — E55.9 VITAMIN D DEFICIENCY DISEASE: Chronic | ICD-10-CM

## 2019-01-25 DIAGNOSIS — Z00.00 ENCOUNTER FOR MEDICARE ANNUAL WELLNESS EXAM: ICD-10-CM

## 2019-01-25 DIAGNOSIS — N95.1 POSTMENOPAUSAL DISORDER: Chronic | ICD-10-CM

## 2019-01-25 DIAGNOSIS — M85.89 OSTEOPENIA OF MULTIPLE SITES: ICD-10-CM

## 2019-01-25 PROCEDURE — G0439 PPPS, SUBSEQ VISIT: HCPCS | Performed by: FAMILY MEDICINE

## 2019-01-25 NOTE — LETTER
January 25, 2019        Shirley Coffey  [address] [address]        Current Outpatient Prescriptions Ordered in [facility]   Medication Sig Dispense Refill   • Risedronate Sodium 150 MG Tab Take 1 Tab by mouth Q30 DAYS. 12 Tab 1   • rosuvastatin (CRESTOR) 20 MG Tab Take 1 Tab by mouth every evening. 90 Tab 3   • aspirin EC (ECOTRIN) 81 MG TBEC Take 1 Tab by mouth every day.     • Cholecalciferol (VITAMIN D) 1000 UNIT CAPS Take 1 Cap by mouth every day.     • L-Methylfolate 15 MG Tab Take 1 Tab by mouth every day. (Patient not taking: Reported on 1/25/2019) 90 Tab 4     No current Epic-ordered facility-administered medications on file.      Electronically Signed

## 2019-01-27 VITALS
SYSTOLIC BLOOD PRESSURE: 122 MMHG | WEIGHT: 103 LBS | DIASTOLIC BLOOD PRESSURE: 78 MMHG | RESPIRATION RATE: 14 BRPM | HEIGHT: 64 IN | TEMPERATURE: 98.4 F | OXYGEN SATURATION: 98 % | HEART RATE: 64 BPM | BODY MASS INDEX: 17.58 KG/M2

## 2019-01-27 ASSESSMENT — ENCOUNTER SYMPTOMS: GENERAL WELL-BEING: GOOD

## 2019-01-27 ASSESSMENT — PATIENT HEALTH QUESTIONNAIRE - PHQ9: CLINICAL INTERPRETATION OF PHQ2 SCORE: 0

## 2019-01-27 ASSESSMENT — ACTIVITIES OF DAILY LIVING (ADL): BATHING_REQUIRES_ASSISTANCE: 0

## 2019-01-28 NOTE — PROGRESS NOTES
CC:   Medicare Annual Wellness Visit    HPI:  Shirley is a 73 y.o. Female here with her  for her Medicare Annual Wellness Visit. She has known osteopenia and has been taking monthly risedronate since November without reported side effects. She has history of vitamin D deficiency on daily supplementation and chronic mixed dyslipidemia with daily statin use. She has known macular pucker of her left eye managed by Optho and progressive dementia followed by Dr. Malave. Both she and her  have not fully accepted the terminal nature of her dementia diagnosis, and both asked me when her condition will return to her former cognitive baseline. She is no longer driving but can still safely live in her home. She denies recent falls, endorses 100% medication compliance with help of her , and remains physically active regularly.     Patient Active Problem List    Diagnosis Date Noted   • Osteopenia of multiple sites 11/01/2018   • Family history of breast cancer in sister 05/05/2016   • Dementia 09/02/2015   • History of sciatica 06/23/2014   • Heterozygous MTHFR mutation C677T ([facility]) 04/30/2014   • Heterozygous MTHFR mutation [id number] ([facility]) 04/30/2014   • 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 10/31/2013   • 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 10/31/2013   • Postmenopausal disorder 10/28/2013   • Vitamin D deficiency disease 10/28/2013   • Macular pucker, left eye    • Mixed hyperlipidemia with apolipoprotein E4 variant    • Atherosclerosis of both carotid arteries      Current Outpatient Prescriptions   Medication Sig Dispense Refill   • Risedronate Sodium 150 MG Tab Take 1 Tab by mouth Q30 DAYS. 12 Tab 1   • rosuvastatin (CRESTOR) 20 MG Tab Take 1 Tab by mouth every evening. 90 Tab 3   • aspirin EC (ECOTRIN) 81 MG TBEC Take 1 Tab by mouth every day.     • Cholecalciferol (VITAMIN D) 1000 UNIT CAPS Take 1 Cap by mouth every day.     • L-Methylfolate 15 MG Tab Take 1 Tab by mouth every day. (Patient not taking: Reported on 1/25/2019) 90  Tab 4     No current facility-administered medications for this visit.       Current supplements: see MAR  Chronic narcotic pain medicines: no  Allergies: Patient has no known allergies.  Exercise: yes  Current social contact/activities: yes  Current mood: good  Advance Directive on file: no    Screening:  Depression Screening    Little interest or pleasure in doing things?  0 - not at all  Feeling down, depressed , or hopeless? 0 - not at all  Patient Health Questionnaire Score: 0       Screening for Cognitive Impairment    Three Minute Recall (leader, season, table) 1/3    Raj clock face with all 12 numbers and set the hands to show 10 past 11.  No    Cognitive concerns identified deferred for follow up unless specifically addressed in assessment and plan.    Fall Risk Assessment    Has the patient had two or more falls in the last year or any fall with injury in the last year?  No    Safety Assessment    Throw rugs on floor.  No  Handrails on all stairs.  Yes  Good lighting in all hallways.  Yes  Difficulty hearing.  No  Patient counseled about all safety risks that were identified.    Functional Assessment ADLs    Are there any barriers preventing you from cooking for yourself or meeting nutritional needs?  No.    Are there any barriers preventing you from driving safely or obtaining transportation?  Yes. Pt is not allowed to drive due to failed driving test (dementia)  Are there any barriers preventing you from using a telephone or calling for help?  No.    Are there any barriers preventing you from shopping?  No.    Are there any barriers preventing you from taking care of your own finances?  Yes.    Are there any barriers preventing you from managing your medications?  Yes.    Are there any barriers preventing you from showering, bathing or dressing yourself?  No.    Are you currently engaging in any exercise or physical activity?  Yes.     What is your perception of your health?  Good.      Health  "Maintenance Summary                MAMMOGRAM Next Due 10/23/2019      Done 10/23/2018 MA-SCREEN MAMMO W/CAD-BILAT     Patient has more history with this topic...    BONE DENSITY Next Due 10/23/2020      Done 10/23/2018 DS-BONE DENSITY STUDY (DEXA)     Patient has more history with this topic...    COLONOSCOPY Next Due 6/21/2021      Done 6/21/2011 REFERRAL TO GI FOR COLONOSCOPY     Patient has more history with this topic...    IMM DTaP/Tdap/Td Vaccine Next Due 10/31/2023      Done 10/31/2013 Imm Admin: Tdap Vaccine          Patient Care Team:  Natacha Bangura M.D. as PCP - General (Family Medicine)  Kerry Moore P.A.-C. as Registered Nurse (Surgery)  Yoli Farnsworth R.N. as Registered Nurse      Social History   Substance Use Topics   • Smoking status: Never Smoker   • Smokeless tobacco: Never Used   • Alcohol use No     Family History   Problem Relation Age of Onset   • Dementia Father    • Cancer Sister 75        Breast Cancer     She  has a past medical history of Arthritis; Atherosclerosis of both carotid arteries; Dementia; Macular pucker, left eye; Memory loss; Mixed hyperlipidemia with apolipoprotein E4 variant; and Vitamin D deficiency disease.   Past Surgical History:   Procedure Laterality Date   • APPENDECTOMY     • HYSTERECTOMY, VAGINAL      No oophorectomy   • TONSILLECTOMY         ROS:    All positives noted in HPI. All others reviewed and are negative.    Ostomy or other tubes or amputations: no  Chronic oxygen use: no  Last eye exam: UTD per pt report  : denies urinary incontinence; does not interfere with ADLs/ sleep  Gait: stable  Problems with balance/ difficulty walking: no  Hearing: good   Dentition: adequate    Exam:   Blood pressure 122/78, pulse 64, temperature 36.9 °C (98.4 °F), temperature source Temporal, resp. rate 14, height 1.626 m (5' 4\"), weight 46.7 kg (103 lb), SpO2 98 %, not currently breastfeeding. Body mass index is 17.68 kg/m².    Gen: Well developed; well " nourished; no acute distress; age appropriate appearance   HEENT: Normocephalic; atraumatic; PEERLA b/l; sclera clear b/l; b/l external auditory canals WNL; b/l TM WNL; nares patent; oropharynx clear; oral mucosa moist; tongue midline; dentition adequate   Neck: No adenopathy; no thyromegaly  CV: Regular rate and rhythm; S1/ S2 present; no murmur, gallop or rub noted  Pulm: No respiratory distress; clear to ascultation b/l; no wheezing or stridor noted b/l  Abd: Adequate bowel sounds noted; soft and nontender; no rebound, rigidity, nor distention   Extremities: No peripheral edema b/l LE extremities/ no clubbing nor cyanosis noted  Skin: Warm and dry; no rashes noted   Neuro: No focal deficits noted; pt is able to get up out of chair unassisted and walk forward  Psych: AAOx2; mood and affect are baseline    Assessment and Plan:  1. Dementia without behavioral disturbance, unspecified dementia type  Progressive condition for patient. We spoke at length about this and spoke about strategies to keep her safe in her own home. I also suggested looking into day programs for Shirley at the [facility] (contact information provided to her ) to help with socialization and support. Pt is no longer driving, and  provides medication management for patient.    2. Heterozygous MTHFR mutation N6164A (HCC)  Stable/ refer to #3    3. Heterozygous MTHFR mutation C677T (HCC)  Stable/ pt encouraged to restart L methylfolate supplementation    4. Macular pucker, left eye  Stable/ managed by Optho    5. Mixed hyperlipidemia with apolipoprotein E4 variant  Stable/ pt is to continue daily statin use    6. Osteopenia of multiple sites  Pt has been taking bisphosphonate therapy since November and next dexa scan will due 10/20.    7. Postmenopausal disorder  Stable/ pt denies current symptoms    8. Vitamin D deficiency disease  Stable/ pt is to continue daily vitamin D supplementation    9. Encounter  for Medicare annual wellness exam  Pt is UTD with all HCM items and has no new needs at this time.     Services needed: no new services needed at this time  Health Care Screening: recommendations as per orders if indicated.  Referrals offered: none  Counseling provided today:  · Prevent falls and reduce trip hazards; Secure or remove rugs if present   · Maintain working fire alarm and carbon monoxide detectors   · Engage in regular physical activity daily and social activities weekly as tolerated     Pt is stable at this time and will be due for annual labs in June 2019.

## 2019-03-12 ENCOUNTER — OFFICE VISIT (OUTPATIENT)
Dept: INTERNAL MEDICINE | Facility: IMAGING CENTER | Age: 74
End: 2019-03-12
Payer: COMMERCIAL

## 2019-03-12 VITALS
DIASTOLIC BLOOD PRESSURE: 65 MMHG | WEIGHT: 103 LBS | BODY MASS INDEX: 17.58 KG/M2 | HEIGHT: 64 IN | SYSTOLIC BLOOD PRESSURE: 125 MMHG | TEMPERATURE: 98.6 F | OXYGEN SATURATION: 98 % | HEART RATE: 63 BPM | RESPIRATION RATE: 15 BRPM

## 2019-03-12 DIAGNOSIS — R23.2 HOT FLASHES: ICD-10-CM

## 2019-03-12 DIAGNOSIS — Z00.00 HEALTH CARE MAINTENANCE: ICD-10-CM

## 2019-03-12 DIAGNOSIS — E55.9 VITAMIN D DEFICIENCY DISEASE: Chronic | ICD-10-CM

## 2019-03-12 DIAGNOSIS — E78.2 MIXED HYPERLIPIDEMIA WITH APOLIPOPROTEIN E4 VARIANT: Chronic | ICD-10-CM

## 2019-03-12 PROCEDURE — 99213 OFFICE O/P EST LOW 20 MIN: CPT | Performed by: FAMILY MEDICINE

## 2019-03-13 NOTE — PROGRESS NOTES
"Chief Complaint   Patient presents with   • Other     nocturnal hot flashes       HPI:  Patient is a 74 y.o. female established patient who presents today for a counseling appointment to discuss nocturnal hot flashes that she has been experiencing intermittently for the past few months. She reports waking up feeling sweaty and cannot contribute this condition to any recent medication changes nor acute illness. She is a very low risk patient for TB and has progressive dementia, which makes her an inconsistent historian. She denies other complaints at this time and cannot recall when she went through complete menopause in prior years.     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     Medications and allergies reviewed and updated in Epic chart by me today.     ROS:  Pertinent positives listed above in HPI. All other systems have been reviewed and are negative.    PE:   /65 (BP Location: Left arm, Patient Position: Sitting, BP Cuff Size: Adult)   Pulse 63   Temp 37 °C (98.6 °F) (Temporal)   Resp 15   Ht 1.626 m (5' 4\")   Wt 46.7 kg (103 lb)   SpO2 98%   BMI 17.68 kg/m²   Vital signs reviewed with patient.     Gen: Well developed; well nourished; no acute distress; age appropriate appearance   Extremities: No peripheral edema b/l LE extremities/ " no clubbing nor cyanosis noted  Skin: Warm and dry; no rashes noted   Neuro: No focal deficits noted   Psych: AAOx2-3; mood and affect are at baseline    A/P:  1. Hot flashes  Unfortunately, this patient suffers from progressive dementia and is an inconsistent historian. I am unclear if she is truly experiencing hot flashes at night but will have her obtain annual fasting labs in the near future to ensure there is no underlying thyroid/ metabolic component to her reported symptoms.    Pt will return later this week for fasting lab draw for further care planning.

## 2019-03-14 ENCOUNTER — HOSPITAL ENCOUNTER (OUTPATIENT)
Facility: MEDICAL CENTER | Age: 74
End: 2019-03-14
Attending: FAMILY MEDICINE
Payer: COMMERCIAL

## 2019-03-14 ENCOUNTER — NON-PROVIDER VISIT (OUTPATIENT)
Dept: INTERNAL MEDICINE | Facility: IMAGING CENTER | Age: 74
End: 2019-03-14
Payer: COMMERCIAL

## 2019-03-14 LAB
FORWARD REASON: SPWHY: NORMAL
FORWARDED TO LAB: SPWHR: NORMAL
SPECIMEN SENT: SPWT1: NORMAL

## 2019-04-30 ENCOUNTER — OFFICE VISIT (OUTPATIENT)
Dept: INTERNAL MEDICINE | Facility: IMAGING CENTER | Age: 74
End: 2019-04-30
Payer: COMMERCIAL

## 2019-04-30 VITALS
BODY MASS INDEX: 17.58 KG/M2 | RESPIRATION RATE: 16 BRPM | OXYGEN SATURATION: 95 % | DIASTOLIC BLOOD PRESSURE: 72 MMHG | WEIGHT: 103 LBS | HEART RATE: 65 BPM | HEIGHT: 64 IN | TEMPERATURE: 98.8 F | SYSTOLIC BLOOD PRESSURE: 120 MMHG

## 2019-04-30 DIAGNOSIS — Z86.59: ICD-10-CM

## 2019-04-30 DIAGNOSIS — R44.3 HALLUCINATIONS: ICD-10-CM

## 2019-04-30 DIAGNOSIS — F03.90 DEMENTIA WITHOUT BEHAVIORAL DISTURBANCE, UNSPECIFIED DEMENTIA TYPE: Chronic | ICD-10-CM

## 2019-04-30 PROCEDURE — 99214 OFFICE O/P EST MOD 30 MIN: CPT | Performed by: FAMILY MEDICINE

## 2019-04-30 NOTE — PROGRESS NOTES
"Chief Complaint   Patient presents with   • Dementia     Now experiencing hallucinations and paranoid thoughts       HPI:  Patient is a 74 y.o. female established patient who presents today with her  to discuss new visual and auditory hallucinations that have been present intermittently for weeks. Patient has a history of chronic dementia previously evaluated by teams at [facility], [facility], and Dr. Malave. She continues to exercise 40-45 minutes per day, eats mainly vegan diet, and travels with her . She reports \"seeing\" people in her home and \"hearing\" their conversations and feels that they may be telling her things she does not want to hear. She is also focused on these people trying to take her money but denies that they tell her to do anything harmful to herself. She is aware that these situations are not real but she spends a lot of energy She and her  both deny that she has been sick recently and deny medication changes. Despite these new issues, Shirley is still able to do her normal ADLs/ home routine with minimal assistance. Her  has always been in somewhat denial of her diagnosis and repeatedly asks about the \"reversibility\" of her condition at each visit.     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     Medications and allergies reviewed and updated in Epic chart by me today.     ROS:  Pertinent positives listed above in HPI. All other systems have been reviewed and are negative.    PE:   /72 (BP Location: Left arm, Patient Position: Sitting, BP Cuff Size: Adult)   Pulse 65   Temp 37.1 °C (98.8 °F) (Temporal)   Resp 16   Ht 1.626 m (5' 4\")   Wt 46.7 kg (103 lb)   SpO2 95%   BMI 17.68 kg/m²   Vital signs reviewed with patient.     Gen: Well developed; well nourished; no acute distress; age appropriate appearance   Skin: Warm and dry; no rashes noted   Neuro: No new focal deficits noted; stable gait observed  Psych: AAOx3; mood and affect are at baseline/ calm      A/P:  1. Dementia without behavioral disturbance, unspecified dementia type  Patient has chronic progressive dementia previously evaluated by [facility], [facility], and Dr. Malave. She is currently not taking any disease specific medication (thought not to be of true benefit to her from these evaluations).   - REFERRAL TO PSYCHIATRY    2. Hallucinations/ H/O paranoid disorder  New condition likely directed related to chronic dementia/ will refer patient to Dr. Janeen Cuello for specialty evaluation and medication evaluation. Patient and  provided with her office information for scheduling purposes.  is keenly aware of this new situation and continues to keep a close eye on Shirley.   - REFERRAL TO PSYCHIATRY    Face to face time: 30 minutes spent with greater than 50% of time spent with direct patient care and counseling about diagnosis, prognosis, risk versus benefits of treatment, and importance of compliance with instructions. All questions answered and support provided during visit today.               "

## 2019-06-11 ENCOUNTER — OFFICE VISIT (OUTPATIENT)
Dept: INTERNAL MEDICINE | Facility: IMAGING CENTER | Age: 74
End: 2019-06-11
Payer: COMMERCIAL

## 2019-06-11 VITALS
RESPIRATION RATE: 16 BRPM | DIASTOLIC BLOOD PRESSURE: 65 MMHG | OXYGEN SATURATION: 98 % | HEIGHT: 64 IN | HEART RATE: 68 BPM | SYSTOLIC BLOOD PRESSURE: 119 MMHG | WEIGHT: 103 LBS | TEMPERATURE: 99.3 F | BODY MASS INDEX: 17.58 KG/M2

## 2019-06-11 DIAGNOSIS — Z87.898 HISTORY OF HALLUCINATIONS: ICD-10-CM

## 2019-06-11 DIAGNOSIS — F03.90 DEMENTIA WITHOUT BEHAVIORAL DISTURBANCE, UNSPECIFIED DEMENTIA TYPE: Chronic | ICD-10-CM

## 2019-06-11 PROCEDURE — 99214 OFFICE O/P EST MOD 30 MIN: CPT | Performed by: FAMILY MEDICINE

## 2019-06-11 NOTE — PROGRESS NOTES
Chief Complaint   Patient presents with   • Other     Increased hallucinations and mood disturbances       HPI:  Patient is a 74 y.o. female established patient who presents today to discuss new hallucination event that occurred on Sunday.  Patient has a history of chronic dementia previously evaluated by teams at [facility], [facility] at [facility], and Dr. Malave, and last saw me for this condition on April 30, 2019. I referred her to Dr. Janeen Cuello for psychiatric assistance, and Shirley and her  established care with Dr. Cuello on June 6. Shirley and her  are both unsure what medication was prescribed to Shirley at the visit, but she has a follow up appointment with Dr. Cuello the first week of July. They have not contacted [facility] nor heard back from Dr. Cuello's office after leaving a message on Sunday during Shirley's hallucination event. I asked if they called Dr. Cuello's office Monday morning but they have not to date. Her  remains very upset at times (and distant) by Shirley's declining mental health and asks me each visit if we can reverse her condition. Shirley continues to remain active with friends and family but has certainly declined over the past year.     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  "    Medications and allergies reviewed and updated in Epic chart by me today.     ROS:  Pertinent positives listed above in HPI. All other systems have been reviewed and are negative.    PE:   /65 (BP Location: Left arm, Patient Position: Sitting, BP Cuff Size: Adult)   Pulse 68   Temp 37.4 °C (99.3 °F) (Temporal)   Resp 16   Ht 1.626 m (5' 4\")   Wt 46.7 kg (103 lb)   SpO2 98%   BMI 17.68 kg/m²   Vital signs reviewed with patient.     Gen: Well developed; well nourished; no acute distress; age appropriate appearance   Skin: Warm and dry; no rashes noted   Neuro: No new focal deficits noted   Psych: AAOx2-3; mood and affect are at dementia baseline    A/P:  1. Dementia without behavioral disturbance, unspecified dementia type  Patient has chronic progressive dementia previously evaluated by [facility], [facility], and Dr. Malave. She is currently not taking any disease related medication (thought not to be of true benefit from specialty evaluation considering side effect profile).    2. History of hallucinations  Related to her progressive dementia and no concern for underlying infection at this time. She and her  established care with Dr. Cuello on June 6 but cannot recall which medication was prescribed nor have followed up with her office/ [facility]. Education provided to patient and specifically her  to be very proactive with follow up contacting these places to make sure Shirley is receiving the intended care plan. Once medication information has been detailed, they are to contact me with information for her chart and for review. I do not want to prescribed any new medication without this important information. Pt also has follow up appointment with Dr. Cuello at the beginning of July, an appointment which she should keep at this time.     Face to face time: 30 minutes spent with greater than 50% of time spent with direct patient care and counseling about " diagnosis, prognosis, risk versus benefits of treatment, and importance of compliance with instructions. All questions answered and support provided during visit today.

## 2019-10-08 ENCOUNTER — NON-PROVIDER VISIT (OUTPATIENT)
Dept: INTERNAL MEDICINE | Facility: IMAGING CENTER | Age: 74
End: 2019-10-08
Payer: COMMERCIAL

## 2019-10-08 ENCOUNTER — HOSPITAL ENCOUNTER (OUTPATIENT)
Facility: MEDICAL CENTER | Age: 74
End: 2019-10-08
Attending: FAMILY MEDICINE
Payer: COMMERCIAL

## 2019-10-08 DIAGNOSIS — R30.0 DYSURIA: ICD-10-CM

## 2019-10-08 DIAGNOSIS — N30.00 ACUTE CYSTITIS WITHOUT HEMATURIA: ICD-10-CM

## 2019-10-08 DIAGNOSIS — R82.90 ABNORMAL URINALYSIS: ICD-10-CM

## 2019-10-08 LAB
APPEARANCE UR: NORMAL
COLOR UR AUTO: YELLOW
FORWARD REASON: SPWHY: NORMAL
FORWARDED TO LAB: SPWHR: NORMAL
GLUCOSE UR STRIP.AUTO-MCNC: NEGATIVE MG/DL
KETONES UR STRIP.AUTO-MCNC: NEGATIVE MG/DL
LEUKOCYTE ESTERASE UR QL STRIP.AUTO: NORMAL
NITRITE UR QL STRIP.AUTO: POSITIVE
PH UR STRIP.AUTO: 7 [PH] (ref 5–8)
PROT UR QL STRIP: NEGATIVE MG/DL
RBC UR QL AUTO: NORMAL
SP GR UR STRIP.AUTO: 1.01
SPECIMEN SENT: SPWT1: NORMAL

## 2019-10-08 PROCEDURE — 81002 URINALYSIS NONAUTO W/O SCOPE: CPT | Performed by: FAMILY MEDICINE

## 2019-10-08 RX ORDER — CEFDINIR 300 MG/1
300 CAPSULE ORAL 2 TIMES DAILY
Qty: 10 CAP | Refills: 0 | Status: SHIPPED | OUTPATIENT
Start: 2019-10-08 | End: 2019-10-13

## 2019-10-14 LAB
BACTERIA UR CULT: ABNORMAL
BACTERIA UR CULT: ABNORMAL
OTHER ANTIBIOTIC SUSC ISLT: ABNORMAL

## 2019-10-14 RX ORDER — RIVASTIGMINE TARTRATE 3 MG/1
CAPSULE ORAL
COMMUNITY
End: 2020-06-22 | Stop reason: SDUPTHER

## 2019-10-15 ENCOUNTER — OFFICE VISIT (OUTPATIENT)
Dept: MEDICAL GROUP | Facility: MEDICAL CENTER | Age: 74
End: 2019-10-15
Payer: COMMERCIAL

## 2019-10-15 VITALS
HEIGHT: 64 IN | HEART RATE: 60 BPM | DIASTOLIC BLOOD PRESSURE: 64 MMHG | WEIGHT: 94 LBS | SYSTOLIC BLOOD PRESSURE: 114 MMHG | TEMPERATURE: 98.1 F | OXYGEN SATURATION: 93 % | BODY MASS INDEX: 16.05 KG/M2

## 2019-10-15 DIAGNOSIS — Z11.59 NEED FOR HEPATITIS C SCREENING TEST: ICD-10-CM

## 2019-10-15 DIAGNOSIS — R53.83 FATIGUE, UNSPECIFIED TYPE: ICD-10-CM

## 2019-10-15 DIAGNOSIS — D69.6 THROMBOCYTOPENIA (HCC): ICD-10-CM

## 2019-10-15 DIAGNOSIS — Z12.31 VISIT FOR SCREENING MAMMOGRAM: ICD-10-CM

## 2019-10-15 DIAGNOSIS — F03.90 DEMENTIA WITHOUT BEHAVIORAL DISTURBANCE, UNSPECIFIED DEMENTIA TYPE: Chronic | ICD-10-CM

## 2019-10-15 DIAGNOSIS — M85.89 OSTEOPENIA OF MULTIPLE SITES: ICD-10-CM

## 2019-10-15 DIAGNOSIS — E78.5 DYSLIPIDEMIA: ICD-10-CM

## 2019-10-15 PROCEDURE — 99204 OFFICE O/P NEW MOD 45 MIN: CPT | Performed by: INTERNAL MEDICINE

## 2019-10-15 RX ORDER — RISPERIDONE 1 MG/1
1 TABLET ORAL
Refills: 2 | COMMUNITY
Start: 2019-08-23 | End: 2019-10-15

## 2019-10-15 RX ORDER — RIVASTIGMINE TARTRATE 1.5 MG/1
1.5 CAPSULE ORAL
Refills: 1 | COMMUNITY
Start: 2019-07-20 | End: 2019-10-15

## 2019-10-15 RX ORDER — RISEDRONATE SODIUM 150 MG/1
150 TABLET, FILM COATED ORAL
Qty: 12 TAB | Refills: 1 | Status: SHIPPED | OUTPATIENT
Start: 2019-10-15 | End: 2019-11-08

## 2019-10-15 RX ORDER — ROSUVASTATIN CALCIUM 20 MG/1
20 TABLET, COATED ORAL EVERY EVENING
Qty: 90 TAB | Refills: 3 | Status: SHIPPED | OUTPATIENT
Start: 2019-10-15 | End: 2019-11-08

## 2019-10-15 NOTE — PROGRESS NOTES
CC: Establishing care dyslipidemia, dementia, porosis.                                                                                                                                      HPI:   Shirley presents today with the following.    1. Dyslipidemia  Presents history of dyslipidemia maintain on statin.  Blood work slightly over 1-year-old.    2. Dementia without behavioral disturbance, unspecified dementia type (HCC)  Reviewing an old record from neurology appears to be Alzheimer's type dementia have hooked up with Alzheimer support community she is maintained on Exelon.  She is trying to stay engaged in terms of her mind.  She does see psychiatry unclear if it is neuropsychiatry who writes for Risperdal.    3. Thrombocytopenia (HCC)  She does have slightly low platelets as of last check she is due for recheck denying any sources of bleeding.  She is maintained on aspirin but no previous history of heart attack or stroke    4. Osteopenia of multiple sites  Contain on bisphosphonate for approximately 1 year last bone density a year ago.    Family History   Problem Relation Age of Onset   • Dementia Father    • Cancer Sister 75        Breast Cancer     Patient Active Problem List    Diagnosis Date Noted   • Osteopenia of multiple sites 11/01/2018   • Dementia (HCC) 09/02/2015   • History of sciatica 06/23/2014   • Postmenopausal disorder 10/28/2013   • Macular pucker, left eye    • Atherosclerosis of both carotid arteries    • Dyslipidemia 04/02/2013     Social History     Socioeconomic History   • Marital status:      Spouse name: Denny   • Number of children: 1   • Years of education: 18   • Highest education level: Not on file   Occupational History   • Occupation: Retired   • Occupation: [employer] manager   Social Needs   • Financial resource strain: Not on file   • Food insecurity:     Worry: Not on file     Inability: Not on file   • Transportation needs:     Medical: Not on file     Non-medical: Not  "on file   Tobacco Use   • Smoking status: Never Smoker   • Smokeless tobacco: Never Used   Substance and Sexual Activity   • Alcohol use: No     Alcohol/week: 0.0 oz   • Drug use: No   • Sexual activity: Yes     Partners: Male   Lifestyle   • Physical activity:     Days per week: Not on file     Minutes per session: Not on file   • Stress: Not on file   Relationships   • Social connections:     Talks on phone: Not on file     Gets together: Not on file     Attends Orthodoxy service: Not on file     Active member of club or organization: Not on file     Attends meetings of clubs or organizations: Not on file     Relationship status: Not on file   • Intimate partner violence:     Fear of current or ex partner: Not on file     Emotionally abused: Not on file     Physically abused: Not on file     Forced sexual activity: Not on file   Other Topics Concern   • Not on file   Social History Narrative   • Not on file     Current Outpatient Medications   Medication Sig Dispense Refill   • rosuvastatin (CRESTOR) 20 MG Tab Take 1 Tab by mouth every evening. 90 Tab 3   • Risedronate Sodium 150 MG Tab Take 1 Tab by mouth Q30 DAYS. 12 Tab 1   • rivastigmine (EXELON) 3 MG Cap      • Cholecalciferol (VITAMIN D) 1000 UNIT CAPS Take 1 Cap by mouth every day.       No current facility-administered medications for this visit.          Allergies as of 10/15/2019   • (No Known Allergies)        ROS:  Denies any Headache,Chest pain,  Shortness of breath,  Abdominal pain, Changes of bowel or bladder, Lower ext edema, Fevers, Nights sweats, Weight Changes, Focal weakness or numbness.  All other systems are negative.    /64 (BP Location: Right arm, Patient Position: Sitting)   Pulse 60   Temp 36.7 °C (98.1 °F)   Ht 1.626 m (5' 4\")   Wt 42.6 kg (94 lb)   SpO2 93%   BMI 16.14 kg/m²      Physical Exam:  Gen:         Alert and oriented, No apparent distress.  HEENT:   Perrla, TM clear,  Oralpharynx no erythema or exudates.  Neck:     "   No Jugular venous distension, Lymphadenopathy, Thyromegaly, Bruits.  Lungs:     Clear to auscultation bilaterally  CV:          Regular rate and rhythm. No murmurs, rubs or gallops.  Abd:         Soft non tender, non distended. Normal active bowel sounds.             Ext:          No clubbing, cyanosis, edema.          Assessment and Plan.   74 y.o. female with the following issues.    1. Dyslipidemia  Refilled medication recheck and cholesterol.  - Comp Metabolic Panel; Future  - Lipid Profile; Future  - rosuvastatin (CRESTOR) 20 MG Tab; Take 1 Tab by mouth every evening.  Dispense: 90 Tab; Refill: 3    2. Dementia without behavioral disturbance, unspecified dementia type (HCC)   reports progressive course will get records from current specialist.    3. Thrombocytopenia (HCC)  Stopping aspirin recheck CBC  - CBC WITH DIFFERENTIAL; Future    4. Osteopenia of multiple sites  Refilled medication repeat bone density one year  - Risedronate Sodium 150 MG Tab; Take 1 Tab by mouth Q30 DAYS.  Dispense: 12 Tab; Refill: 1    5. Visit for screening mammogram    - MA-SCREEN MAMMO W/CAD-BILAT; Future    6. Need for hepatitis C screening test    - HEP C VIRUS ANTIBODY; Future      - TSH; Future

## 2019-11-04 ENCOUNTER — HOSPITAL ENCOUNTER (OUTPATIENT)
Dept: RADIOLOGY | Facility: MEDICAL CENTER | Age: 74
End: 2019-11-04
Attending: FAMILY MEDICINE
Payer: COMMERCIAL

## 2019-11-04 DIAGNOSIS — Z12.31 VISIT FOR SCREENING MAMMOGRAM: ICD-10-CM

## 2019-11-04 PROCEDURE — 77063 BREAST TOMOSYNTHESIS BI: CPT

## 2019-11-08 ENCOUNTER — OFFICE VISIT (OUTPATIENT)
Dept: MEDICAL GROUP | Facility: MEDICAL CENTER | Age: 74
End: 2019-11-08
Payer: COMMERCIAL

## 2019-11-08 VITALS
BODY MASS INDEX: 16.73 KG/M2 | WEIGHT: 98 LBS | DIASTOLIC BLOOD PRESSURE: 64 MMHG | HEART RATE: 60 BPM | OXYGEN SATURATION: 95 % | HEIGHT: 64 IN | TEMPERATURE: 97.6 F | SYSTOLIC BLOOD PRESSURE: 106 MMHG

## 2019-11-08 DIAGNOSIS — F03.90 DEMENTIA WITHOUT BEHAVIORAL DISTURBANCE, UNSPECIFIED DEMENTIA TYPE: Chronic | ICD-10-CM

## 2019-11-08 PROCEDURE — 99213 OFFICE O/P EST LOW 20 MIN: CPT | Performed by: INTERNAL MEDICINE

## 2019-11-08 NOTE — PROGRESS NOTES
"      CC: Dementia                                                                                                                                      HPI:   Shirley presents today with the following.    1. Dementia without behavioral disturbance, unspecified dementia type (HCC)  Presents with her  today after having mammogram and bone density both look fairly normal.  She is maintain on medications.  She does have rather severe dementia he reports she does not always recognize him.  He is trying to do his best to redirect.  She is maintained on cholesterol medications as well.      Patient Active Problem List    Diagnosis Date Noted   • Osteopenia of multiple sites 11/01/2018   • Dementia (HCC) 09/02/2015   • History of sciatica 06/23/2014   • Postmenopausal disorder 10/28/2013   • Macular pucker, left eye    • Atherosclerosis of both carotid arteries    • Dyslipidemia 04/02/2013       Current Outpatient Medications   Medication Sig Dispense Refill   • rivastigmine (EXELON) 3 MG Cap      • Cholecalciferol (VITAMIN D) 1000 UNIT CAPS Take 1 Cap by mouth every day.       No current facility-administered medications for this visit.          Allergies as of 11/08/2019   • (No Known Allergies)        ROS: Denies Chest pain, SOB, LE edema.    /64 (BP Location: Right arm, Patient Position: Sitting)   Pulse 60   Temp 36.4 °C (97.6 °F)   Ht 1.626 m (5' 4\")   Wt 44.5 kg (98 lb)   SpO2 95%   BMI 16.82 kg/m²     Physical Exam:  Gen:         Alert and oriented, No apparent distress.  Neck:        No Lymphadenopathy or Bruits.  Lungs:     Clear to auscultation bilaterally  CV:          Regular rate and rhythm. No murmurs, rubs or gallops.               Ext:          No clubbing, cyanosis, edema.      Assessment and Plan.   74 y.o. female with the following issues.    1. Dementia without behavioral disturbance, unspecified dementia type (HCC)  Dementias does seem to be progressing do not see benefit in the long " run of continued statin medications and again bone density looks fairly good.  Discontinuing medications besides Exelon we will see back in 6 months.  Continue to follow along with neuro.

## 2019-12-31 ENCOUNTER — OFFICE VISIT (OUTPATIENT)
Dept: MEDICAL GROUP | Facility: MEDICAL CENTER | Age: 74
End: 2019-12-31
Payer: COMMERCIAL

## 2019-12-31 VITALS
BODY MASS INDEX: 17.11 KG/M2 | HEART RATE: 61 BPM | TEMPERATURE: 98 F | SYSTOLIC BLOOD PRESSURE: 98 MMHG | HEIGHT: 64 IN | WEIGHT: 100.2 LBS | OXYGEN SATURATION: 99 % | DIASTOLIC BLOOD PRESSURE: 60 MMHG

## 2019-12-31 DIAGNOSIS — N89.8 VAGINAL IRRITATION: ICD-10-CM

## 2019-12-31 PROBLEM — N95.9 MENOPAUSAL AND POSTMENOPAUSAL DISORDER: Status: ACTIVE | Noted: 2019-12-31

## 2019-12-31 PROBLEM — E78.5 HYPERLIPEMIA: Status: ACTIVE | Noted: 2019-12-31

## 2019-12-31 PROCEDURE — 99213 OFFICE O/P EST LOW 20 MIN: CPT | Performed by: INTERNAL MEDICINE

## 2019-12-31 RX ORDER — RISPERIDONE 1 MG/1
TABLET ORAL
COMMUNITY
Start: 2019-11-13 | End: 2020-06-22 | Stop reason: SDUPTHER

## 2019-12-31 NOTE — PROGRESS NOTES
"      CC: Vaginal irritation                                                                                                                               HPI:   Shirley presents today with the following.    1. Vaginal irritation  Presents with a history of dementia complaining of some intermittent vaginal irritation.  She denies any discharge reports an itching and sometimes burning.  She does condone dryness.  Symptoms are intermittent in nature and did not occur daily.  Denies any urinary symptoms and no other physical complaints.      Patient Active Problem List    Diagnosis Date Noted   • Hyperlipemia 12/31/2019   • Menopausal and postmenopausal disorder 12/31/2019   • Osteopenia of multiple sites 11/01/2018   • Dementia (HCC) 09/02/2015   • History of sciatica 06/23/2014   • Postmenopausal disorder 10/28/2013   • Macular pucker, left eye    • Atherosclerosis of both carotid arteries    • Dyslipidemia 04/02/2013       Current Outpatient Medications   Medication Sig Dispense Refill   • estrogens, conjugated (PREMARIN) 0.625 MG/GM Cream Insert 1 g in vagina every 7 days. 1 Tube 11   • risperiDONE (RISPERDAL) 1 MG Tab      • rivastigmine (EXELON) 3 MG Cap      • Cholecalciferol (VITAMIN D) 1000 UNIT CAPS Take 1 Cap by mouth every day.       No current facility-administered medications for this visit.          Allergies as of 12/31/2019   • (No Known Allergies)        ROS: Denies Chest pain, SOB, LE edema.    BP (!) 98/60 (BP Location: Right arm, Patient Position: Sitting, BP Cuff Size: Adult)   Pulse 61   Temp 36.7 °C (98 °F) (Temporal)   Ht 1.626 m (5' 4\")   Wt 45.5 kg (100 lb 3.2 oz)   SpO2 99%   BMI 17.20 kg/m²     Physical Exam:  Gen:         Alert and oriented, No apparent distress.  Neck:        No Lymphadenopathy or Bruits.  Lungs:     Clear to auscultation bilaterally  CV:          Regular rate and rhythm. No murmurs, rubs or gallops.               Ext:          No clubbing, cyanosis, " edema.      Assessment and Plan.   74 y.o. female with the following issues.    1. Vaginal irritation  Have placed on a Premarin cream weekly follow-up if not improving.

## 2020-02-18 ENCOUNTER — TELEPHONE (OUTPATIENT)
Dept: MEDICAL GROUP | Facility: MEDICAL CENTER | Age: 75
End: 2020-02-18

## 2020-02-18 DIAGNOSIS — F03.90 DEMENTIA WITHOUT BEHAVIORAL DISTURBANCE, UNSPECIFIED DEMENTIA TYPE: ICD-10-CM

## 2020-02-18 NOTE — TELEPHONE ENCOUNTER
1. Caller Name: Don                        Call Back Number: [phone]      How would the patient prefer to be contacted with a response: Phone call do NOT leave a detailed message    2. SPECIFIC Action To Be Taken: Referral pending, please sign.    3. Diagnosis/Clinical Reason for Request: Dementia    4. Specialty & Provider Name/Lab/Imaging Location: Dr. Russo    5. Is appointment scheduled for requested order/referral: no    Patient was informed they will receive a return phone call from the office ONLY if there are any questions before processing their request. Advised to call back if they haven't received a call from the referral department in 5 days.

## 2020-04-27 ENCOUNTER — TELEPHONE (OUTPATIENT)
Dept: NEUROLOGY | Facility: MEDICAL CENTER | Age: 75
End: 2020-04-27

## 2020-04-28 ENCOUNTER — TELEPHONE (OUTPATIENT)
Dept: MEDICAL GROUP | Facility: MEDICAL CENTER | Age: 75
End: 2020-04-28

## 2020-04-28 DIAGNOSIS — F03.90 DEMENTIA WITHOUT BEHAVIORAL DISTURBANCE, UNSPECIFIED DEMENTIA TYPE: ICD-10-CM

## 2020-04-28 NOTE — TELEPHONE ENCOUNTER
1. Caller Name: Cam                        Call Back Number: [phone]      How would the patient prefer to be contacted with a response: Phone call do NOT leave a detailed message    2. SPECIFIC Action To Be Taken: Referral pending, please sign.    3. Diagnosis/Clinical Reason for Request: Dementia    4. Specialty & Provider Name/Lab/Imaging Location: Dr. [facility]    5. Is appointment scheduled for requested order/referral: no    Patient was not informed they will receive a return phone call from the office ONLY if there are any questions before processing their request. Advised to call back if they haven't received a call from the referral department in 5 days.    Would like referral changed to [facility].

## 2020-04-29 ENCOUNTER — PATIENT MESSAGE (OUTPATIENT)
Dept: HEALTH INFORMATION MANAGEMENT | Facility: OTHER | Age: 75
End: 2020-04-29

## 2020-04-29 ENCOUNTER — TELEPHONE (OUTPATIENT)
Dept: MEDICAL GROUP | Facility: MEDICAL CENTER | Age: 75
End: 2020-04-29

## 2020-04-29 DIAGNOSIS — F03.90 DEMENTIA WITHOUT BEHAVIORAL DISTURBANCE, UNSPECIFIED DEMENTIA TYPE: ICD-10-CM

## 2020-04-29 NOTE — TELEPHONE ENCOUNTER
1. Caller Name: Don                        Call Back Number: [phone]      How would the patient prefer to be contacted with a response: Phone call do NOT leave a detailed message    2. SPECIFIC Action To Be Taken: Referral pending, please sign.    3. Diagnosis/Clinical Reason for Request: dementia    4. Specialty & Provider Name/Lab/Imaging Location: Psychiatrist    5. Is appointment scheduled for requested order/referral: no    Patient was not informed they will receive a return phone call from the office ONLY if there are any questions before processing their request. Advised to call back if they haven't received a call from the referral department in 5 days.

## 2020-04-30 ENCOUNTER — TELEMEDICINE (OUTPATIENT)
Dept: NEUROLOGY | Facility: MEDICAL CENTER | Age: 75
End: 2020-04-30
Payer: COMMERCIAL

## 2020-04-30 VITALS — BODY MASS INDEX: 16.73 KG/M2 | HEIGHT: 64 IN | WEIGHT: 98 LBS

## 2020-04-30 DIAGNOSIS — G30.8 ALZHEIMER'S DISEASE OF OTHER ONSET WITH BEHAVIORAL DISTURBANCE: ICD-10-CM

## 2020-04-30 DIAGNOSIS — R25.3 MUSCLE TWITCHING: ICD-10-CM

## 2020-04-30 DIAGNOSIS — F02.818 ALZHEIMER'S DISEASE OF OTHER ONSET WITH BEHAVIORAL DISTURBANCE: ICD-10-CM

## 2020-04-30 PROCEDURE — 99204 OFFICE O/P NEW MOD 45 MIN: CPT | Mod: 95,CR

## 2020-04-30 RX ORDER — M-VIT,TX,IRON,MINS/CALC/FOLIC 27MG-0.4MG
1 TABLET ORAL DAILY
COMMUNITY
End: 2020-06-26 | Stop reason: SDUPTHER

## 2020-04-30 RX ORDER — MEMANTINE HYDROCHLORIDE 5 MG/1
5 TABLET ORAL 2 TIMES DAILY
Qty: 90 TAB | Refills: 3 | Status: SHIPPED | OUTPATIENT
Start: 2020-04-30 | End: 2020-06-08

## 2020-04-30 RX ORDER — ROSUVASTATIN CALCIUM 20 MG/1
20 TABLET, COATED ORAL
COMMUNITY
Start: 2020-03-12 | End: 2020-06-18

## 2020-04-30 ASSESSMENT — FIBROSIS 4 INDEX: FIB4 SCORE: 4.41

## 2020-05-01 NOTE — PROGRESS NOTES
Telemedicine Visit: New Patient     This encounter was conducted via Zoom .   Verbal consent was obtained. Patient's identity was verified.    Subjective:     CC: memory loss  Shirley Coffey is a 75 y.o. female presenting to establish care and to discuss the evaluation and management of memory loss.  She is here with her  who is able to provide meaningful history. Shirley retired 20 years ago and had been staying at home since. Memory loss started 5-6 years ago in the beginning slowly with difficulty remembering words, conversations,appointments and evolving to profound cognitive decline over past 1-2 years. AS a result she can not cook, does not pay bills, needs reminders to take her medications and can not walk outside alone;. She does not drive.  She has word finding difficulties and speaks slowly.  There are episodes of agitation and hallucinations before bedtime in the evening and she takes risperidone prn for this.  She sleeps well and her appetite and preference for food types had not changed significantly.  She lives with her  and needs significant assistance with her ADL's.  She does not smoke, drink alcohol or use drugs.  Her father had dementia and developed symptoms in his early 90's.    ROS  See HPI  Constitutional: Negative for fever, chills and malaise/fatigue.   HENT: Negative for congestion.    Eyes: Negative for pain.   Respiratory: Negative for cough and shortness of breath.    Cardiovascular: Negative for leg swelling.   Gastrointestinal: Negative for nausea, vomiting, abdominal pain and diarrhea.   Genitourinary: Negative for dysuria and hematuria.   Skin: Negative for rash.   Neurological:as above   Endo/Heme/Allergies: Does not bruise/bleed easily.   Psychiatric/Behavioral: Negative for depression.  The patient is not nervous/anxious.      No Known Allergies    Current medicines (including changes today)  Current Outpatient Medications   Medication Sig Dispense Refill   •  "rosuvastatin (CRESTOR) 20 MG Tab Take 20 mg by mouth every day. N THE EVENING     • LOW-DOSE ASPIRIN PO      • therapeutic multivitamin-minerals (THERAGRAN-M) Tab Take 1 Tab by mouth every day.     • aspirin EC (ECOTRIN) 81 MG Tablet Delayed Response Take 81 mg by mouth every day.     • memantine (NAMENDA) 5 MG Tab Take 1 Tab by mouth 2 times a day. 90 Tab 3   • risperiDONE (RISPERDAL) 1 MG Tab      • rivastigmine (EXELON) 3 MG Cap      • Cholecalciferol (VITAMIN D) 1000 UNIT CAPS Take 1 Cap by mouth every day.     • estrogens, conjugated (PREMARIN) 0.625 MG/GM Cream Insert 1 g in vagina every 7 days. 1 Tube 11     No current facility-administered medications for this visit.        She  has a past medical history of Arthritis, Atherosclerosis of both carotid arteries, Dementia (HCC), Macular pucker, left eye, Memory loss, Mixed hyperlipidemia with apolipoprotein E4 variant, and Vitamin D deficiency disease.  She  has a past surgical history that includes tonsillectomy; appendectomy; and hysterectomy, vaginal.      Family History   Problem Relation Age of Onset   • Dementia Father    • Cancer Sister 75        Breast Cancer     Family Status   Relation Name Status   • Fa   at age 90's        natural causes   • Mo   at age 80's        MVA   • Sis   at age 75        Breast Cancer   • Sis  (Not Specified)       Patient Active Problem List    Diagnosis Date Noted   • Hyperlipemia 2019   • Menopausal and postmenopausal disorder 2019   • Osteopenia of multiple sites 2018   • Dementia (HCC) 2015   • History of sciatica 2014   • Postmenopausal disorder 10/28/2013   • Macular pucker, left eye    • Atherosclerosis of both carotid arteries    • Dyslipidemia 2013          Objective:   Vitals obtained by patient:  Encounter Vitals  Standard Vitals  Vitals  Blood Pressure : (Does not monitor at home. Telemed)  Height: 162.6 cm (5' 4\")  Weight: 44.5 kg (98 " "lb)(stated)  Encounter Vitals  Blood Pressure : (Does not monitor at home. Telemed)  Weight: 44.5 kg (98 lb)(stated)  Height: 162.6 cm (5' 4\")  BMI (Calculated): 16.82  Pulmonary-Specific Vitals     Durable Medical Equipment-Specific Vitals             Physical Exam:  Constitutional: Alert, no distress, well-groomed.  Skin: No rashes in visible areas.  Eye: Round. Conjunctiva clear, lids normal. No icterus.   ENMT: Lips pink without lesions, good dentition, moist mucous membranes. Phonation normal.  Neck: No masses, no thyromegaly. Moves freely without pain.  CV: Pulse as reported by patient  Respiratory: Unlabored respiratory effort, no cough or audible wheeze  Psych: Alert and oriented x3, normal affect and mood.   Neurological exam   MOCA 12/30  Cn 2-12 intact   Face symmetric   Motor normal   Gait casual bradykinesia +  No tremor   No shuffle   No ataxia       Assessment and Plan:   74 yo female with major neurocognitive disorder.  This is likely advanced form of Alzheimer's disease.  We discussed safety at home,engaging strategies and once COVId 19 is under control participating at [facility] [place] for dementia care.  Discussed brain engaging strategies   Physical activity  I would limit risperidone and try melatonin for sleep perhaps with valerian root  Can also consider quatiapine or aripiprazole.  Low dose Zoloft can also be considered as mood stabilizer.      The following treatment plan was discussed:     1. Alzheimer's disease of other onset with behavioral disturbance (HCC)  - MR-BRAIN-W/O; Future  - REFERRAL TO NEURODIAGNOSTICS (EEG,EP,EMG/NCS/DBS) Modality Requested: EEG-Video    2. Muscle twitching  - REFERRAL TO NEURODIAGNOSTICS (EEG,EP,EMG/NCS/DBS) Modality Requested: EEG-Video    Other orders  - rosuvastatin (CRESTOR) 20 MG Tab; Take 20 mg by mouth every day. N THE EVENING  - LOW-DOSE ASPIRIN PO  - therapeutic multivitamin-minerals (THERAGRAN-M) Tab; Take 1 Tab by mouth every day.  - " aspirin EC (ECOTRIN) 81 MG Tablet Delayed Response; Take 81 mg by mouth every day.  - memantine (NAMENDA) 5 MG Tab; Take 1 Tab by mouth 2 times a day.  Dispense: 90 Tab; Refill: 3    Will start Memantine 5 mg bid     Follow-up: No follow-ups on file.

## 2020-05-08 PROBLEM — R44.3 HALLUCINATION: Status: ACTIVE | Noted: 2020-04-08

## 2020-06-08 RX ORDER — MEMANTINE HYDROCHLORIDE 5 MG/1
TABLET ORAL
Qty: 90 TAB | Refills: 3 | Status: SHIPPED | OUTPATIENT
Start: 2020-06-08 | End: 2020-06-18

## 2020-06-18 ENCOUNTER — OFFICE VISIT (OUTPATIENT)
Dept: MEDICAL GROUP | Facility: MEDICAL CENTER | Age: 75
End: 2020-06-18
Payer: COMMERCIAL

## 2020-06-18 VITALS
DIASTOLIC BLOOD PRESSURE: 68 MMHG | WEIGHT: 103 LBS | BODY MASS INDEX: 17.58 KG/M2 | HEIGHT: 64 IN | SYSTOLIC BLOOD PRESSURE: 116 MMHG | OXYGEN SATURATION: 92 % | HEART RATE: 60 BPM | TEMPERATURE: 98.2 F

## 2020-06-18 DIAGNOSIS — F03.90 DEMENTIA WITHOUT BEHAVIORAL DISTURBANCE, UNSPECIFIED DEMENTIA TYPE: Chronic | ICD-10-CM

## 2020-06-18 PROCEDURE — 99213 OFFICE O/P EST LOW 20 MIN: CPT | Performed by: INTERNAL MEDICINE

## 2020-06-18 NOTE — PROGRESS NOTES
"      CC: Follow-up dementia                                                                                                                                      HPI:   Shirley presents today with the following.    1. Dementia without behavioral disturbance, unspecified dementia type (HCC)  Presents maintained on medications no longer seeing neurology was left.  Reporting no major changes to behavior or other complaints today.  She is pleasant well-groomed and has no nuclear concerns.  She is overdue for her blood work      Patient Active Problem List    Diagnosis Date Noted   • Hallucination 04/08/2020   • Hyperlipemia 12/31/2019   • Menopausal and postmenopausal disorder 12/31/2019   • Osteopenia of multiple sites 11/01/2018   • Dementia (HCC) 09/02/2015   • History of sciatica 06/23/2014   • Macular pucker, left eye    • Atherosclerosis of both carotid arteries    • Dyslipidemia 04/02/2013       Current Outpatient Medications   Medication Sig Dispense Refill   • therapeutic multivitamin-minerals (THERAGRAN-M) Tab Take 1 Tab by mouth every day.     • aspirin EC (ECOTRIN) 81 MG Tablet Delayed Response Take 81 mg by mouth every day.     • risperiDONE (RISPERDAL) 1 MG Tab      • rivastigmine (EXELON) 3 MG Cap      • Cholecalciferol (VITAMIN D) 1000 UNIT CAPS Take 1 Cap by mouth every day.       No current facility-administered medications for this visit.          Allergies as of 06/18/2020   • (No Known Allergies)        ROS: Denies Chest pain, SOB, LE edema.    /68 (BP Location: Left arm, Patient Position: Sitting)   Pulse 60   Temp 36.8 °C (98.2 °F)   Ht 1.626 m (5' 4\")   Wt 46.7 kg (103 lb)   SpO2 92%   BMI 17.68 kg/m²     Physical Exam:  Gen:         Alert and oriented, No apparent distress.  Neck:        No Lymphadenopathy or Bruits.  Lungs:     Clear to auscultation bilaterally  CV:          Regular rate and rhythm. No murmurs, rubs or gallops.               Ext:          No clubbing, cyanosis, " edema.      Assessment and Plan.   75 y.o. female with the following issues.    1. Dementia without behavioral disturbance, unspecified dementia type (HCC)  Symptoms do not seem to have advanced significantly we will continue to manage expectantly along with new neurologist.

## 2020-06-22 RX ORDER — RIVASTIGMINE TARTRATE 3 MG/1
3 CAPSULE ORAL 2 TIMES DAILY
Qty: 60 CAP | Refills: 6 | Status: SHIPPED | OUTPATIENT
Start: 2020-06-22 | End: 2020-07-02 | Stop reason: SDUPTHER

## 2020-06-22 RX ORDER — RISPERIDONE 1 MG/1
1 TABLET ORAL 2 TIMES DAILY
Qty: 60 TAB | Refills: 3 | Status: SHIPPED | OUTPATIENT
Start: 2020-06-22 | End: 2020-08-31 | Stop reason: SDUPTHER

## 2020-06-26 RX ORDER — M-VIT,TX,IRON,MINS/CALC/FOLIC 27MG-0.4MG
1 TABLET ORAL DAILY
Qty: 90 TAB | Refills: 3 | Status: SHIPPED | OUTPATIENT
Start: 2020-06-26 | End: 2022-03-29

## 2020-06-27 ENCOUNTER — HOSPITAL ENCOUNTER (OUTPATIENT)
Dept: LAB | Facility: MEDICAL CENTER | Age: 75
End: 2020-06-27
Attending: INTERNAL MEDICINE
Payer: COMMERCIAL

## 2020-06-27 DIAGNOSIS — Z11.59 NEED FOR HEPATITIS C SCREENING TEST: ICD-10-CM

## 2020-06-27 DIAGNOSIS — D69.6 THROMBOCYTOPENIA (HCC): ICD-10-CM

## 2020-06-27 DIAGNOSIS — E78.5 DYSLIPIDEMIA: ICD-10-CM

## 2020-06-27 DIAGNOSIS — R53.83 FATIGUE, UNSPECIFIED TYPE: ICD-10-CM

## 2020-06-27 LAB
ALBUMIN SERPL BCP-MCNC: 4.7 G/DL (ref 3.2–4.9)
ALBUMIN/GLOB SERPL: 1.9 G/DL
ALP SERPL-CCNC: 77 U/L (ref 30–99)
ALT SERPL-CCNC: 14 U/L (ref 2–50)
ANION GAP SERPL CALC-SCNC: 12 MMOL/L (ref 7–16)
AST SERPL-CCNC: 24 U/L (ref 12–45)
BASOPHILS # BLD AUTO: 0.5 % (ref 0–1.8)
BASOPHILS # BLD: 0.03 K/UL (ref 0–0.12)
BILIRUB SERPL-MCNC: 0.8 MG/DL (ref 0.1–1.5)
BUN SERPL-MCNC: 22 MG/DL (ref 8–22)
CALCIUM SERPL-MCNC: 10.6 MG/DL (ref 8.5–10.5)
CHLORIDE SERPL-SCNC: 104 MMOL/L (ref 96–112)
CHOLEST SERPL-MCNC: 284 MG/DL (ref 100–199)
CO2 SERPL-SCNC: 27 MMOL/L (ref 20–33)
CREAT SERPL-MCNC: 0.94 MG/DL (ref 0.5–1.4)
EOSINOPHIL # BLD AUTO: 0.04 K/UL (ref 0–0.51)
EOSINOPHIL NFR BLD: 0.7 % (ref 0–6.9)
ERYTHROCYTE [DISTWIDTH] IN BLOOD BY AUTOMATED COUNT: 45.8 FL (ref 35.9–50)
FASTING STATUS PATIENT QL REPORTED: NORMAL
GLOBULIN SER CALC-MCNC: 2.5 G/DL (ref 1.9–3.5)
GLUCOSE SERPL-MCNC: 91 MG/DL (ref 65–99)
HCT VFR BLD AUTO: 47.3 % (ref 37–47)
HCV AB SER QL: NORMAL
HDLC SERPL-MCNC: 87 MG/DL
HGB BLD-MCNC: 15.7 G/DL (ref 12–16)
IMM GRANULOCYTES # BLD AUTO: 0.01 K/UL (ref 0–0.11)
IMM GRANULOCYTES NFR BLD AUTO: 0.2 % (ref 0–0.9)
LDLC SERPL CALC-MCNC: 169 MG/DL
LYMPHOCYTES # BLD AUTO: 1.17 K/UL (ref 1–4.8)
LYMPHOCYTES NFR BLD: 21.2 % (ref 22–41)
MCH RBC QN AUTO: 32.2 PG (ref 27–33)
MCHC RBC AUTO-ENTMCNC: 33.2 G/DL (ref 33.6–35)
MCV RBC AUTO: 96.9 FL (ref 81.4–97.8)
MONOCYTES # BLD AUTO: 0.41 K/UL (ref 0–0.85)
MONOCYTES NFR BLD AUTO: 7.4 % (ref 0–13.4)
NEUTROPHILS # BLD AUTO: 3.87 K/UL (ref 2–7.15)
NEUTROPHILS NFR BLD: 70 % (ref 44–72)
NRBC # BLD AUTO: 0 K/UL
NRBC BLD-RTO: 0 /100 WBC
PLATELET # BLD AUTO: 158 K/UL (ref 164–446)
PMV BLD AUTO: 8.9 FL (ref 9–12.9)
POTASSIUM SERPL-SCNC: 4.3 MMOL/L (ref 3.6–5.5)
PROT SERPL-MCNC: 7.2 G/DL (ref 6–8.2)
RBC # BLD AUTO: 4.88 M/UL (ref 4.2–5.4)
SODIUM SERPL-SCNC: 143 MMOL/L (ref 135–145)
TRIGL SERPL-MCNC: 141 MG/DL (ref 0–149)
TSH SERPL DL<=0.005 MIU/L-ACNC: 2.07 UIU/ML (ref 0.38–5.33)
WBC # BLD AUTO: 5.5 K/UL (ref 4.8–10.8)

## 2020-06-27 PROCEDURE — 84443 ASSAY THYROID STIM HORMONE: CPT

## 2020-06-27 PROCEDURE — 80053 COMPREHEN METABOLIC PANEL: CPT

## 2020-06-27 PROCEDURE — 36415 COLL VENOUS BLD VENIPUNCTURE: CPT

## 2020-06-27 PROCEDURE — 86803 HEPATITIS C AB TEST: CPT

## 2020-06-27 PROCEDURE — 85025 COMPLETE CBC W/AUTO DIFF WBC: CPT

## 2020-06-27 PROCEDURE — 80061 LIPID PANEL: CPT

## 2020-07-02 RX ORDER — RIVASTIGMINE TARTRATE 3 MG/1
3 CAPSULE ORAL 2 TIMES DAILY
Qty: 60 CAP | Refills: 6 | Status: SHIPPED | OUTPATIENT
Start: 2020-07-02 | End: 2020-08-31

## 2020-07-09 DIAGNOSIS — R45.86 MOOD DISTURBANCE: ICD-10-CM

## 2020-07-09 RX ORDER — CONJUGATED ESTROGENS 0.62 MG/G
1 CREAM VAGINAL
Qty: 1 TUBE | Refills: 11 | Status: SHIPPED | OUTPATIENT
Start: 2020-07-09 | End: 2020-11-23

## 2020-07-12 ENCOUNTER — APPOINTMENT (OUTPATIENT)
Dept: RADIOLOGY | Facility: MEDICAL CENTER | Age: 75
End: 2020-07-12
Attending: EMERGENCY MEDICINE
Payer: COMMERCIAL

## 2020-07-12 ENCOUNTER — HOSPITAL ENCOUNTER (EMERGENCY)
Facility: MEDICAL CENTER | Age: 75
End: 2020-07-12
Attending: EMERGENCY MEDICINE
Payer: COMMERCIAL

## 2020-07-12 VITALS
HEIGHT: 62 IN | RESPIRATION RATE: 18 BRPM | OXYGEN SATURATION: 100 % | BODY MASS INDEX: 17.48 KG/M2 | SYSTOLIC BLOOD PRESSURE: 136 MMHG | TEMPERATURE: 96 F | DIASTOLIC BLOOD PRESSURE: 61 MMHG | WEIGHT: 95 LBS | HEART RATE: 46 BPM

## 2020-07-12 DIAGNOSIS — N20.1 URETEROLITHIASIS: ICD-10-CM

## 2020-07-12 LAB
ALBUMIN SERPL BCP-MCNC: 3.9 G/DL (ref 3.2–4.9)
ALBUMIN/GLOB SERPL: 1.6 G/DL
ALP SERPL-CCNC: 78 U/L (ref 30–99)
ALT SERPL-CCNC: 19 U/L (ref 2–50)
ANION GAP SERPL CALC-SCNC: 11 MMOL/L (ref 7–16)
APPEARANCE UR: CLEAR
AST SERPL-CCNC: 28 U/L (ref 12–45)
BACTERIA #/AREA URNS HPF: ABNORMAL /HPF
BASOPHILS # BLD AUTO: 0.5 % (ref 0–1.8)
BASOPHILS # BLD: 0.04 K/UL (ref 0–0.12)
BILIRUB SERPL-MCNC: 0.4 MG/DL (ref 0.1–1.5)
BILIRUB UR QL STRIP.AUTO: NEGATIVE
BUN SERPL-MCNC: 25 MG/DL (ref 8–22)
CALCIUM SERPL-MCNC: 10.1 MG/DL (ref 8.5–10.5)
CHLORIDE SERPL-SCNC: 106 MMOL/L (ref 96–112)
CO2 SERPL-SCNC: 23 MMOL/L (ref 20–33)
COLOR UR: YELLOW
CREAT SERPL-MCNC: 0.89 MG/DL (ref 0.5–1.4)
EKG IMPRESSION: NORMAL
EOSINOPHIL # BLD AUTO: 0.05 K/UL (ref 0–0.51)
EOSINOPHIL NFR BLD: 0.6 % (ref 0–6.9)
EPI CELLS #/AREA URNS HPF: NEGATIVE /HPF
ERYTHROCYTE [DISTWIDTH] IN BLOOD BY AUTOMATED COUNT: 44.5 FL (ref 35.9–50)
GLOBULIN SER CALC-MCNC: 2.5 G/DL (ref 1.9–3.5)
GLUCOSE SERPL-MCNC: 109 MG/DL (ref 65–99)
GLUCOSE UR STRIP.AUTO-MCNC: NEGATIVE MG/DL
HCT VFR BLD AUTO: 42.7 % (ref 37–47)
HGB BLD-MCNC: 14.2 G/DL (ref 12–16)
HYALINE CASTS #/AREA URNS LPF: ABNORMAL /LPF
IMM GRANULOCYTES # BLD AUTO: 0.04 K/UL (ref 0–0.11)
IMM GRANULOCYTES NFR BLD AUTO: 0.5 % (ref 0–0.9)
KETONES UR STRIP.AUTO-MCNC: ABNORMAL MG/DL
LACTATE BLD-SCNC: 2.3 MMOL/L (ref 0.5–2)
LEUKOCYTE ESTERASE UR QL STRIP.AUTO: NEGATIVE
LIPASE SERPL-CCNC: 32 U/L (ref 11–82)
LYMPHOCYTES # BLD AUTO: 1.77 K/UL (ref 1–4.8)
LYMPHOCYTES NFR BLD: 21 % (ref 22–41)
MCH RBC QN AUTO: 33.1 PG (ref 27–33)
MCHC RBC AUTO-ENTMCNC: 33.3 G/DL (ref 33.6–35)
MCV RBC AUTO: 99.5 FL (ref 81.4–97.8)
MICRO URNS: ABNORMAL
MONOCYTES # BLD AUTO: 0.41 K/UL (ref 0–0.85)
MONOCYTES NFR BLD AUTO: 4.9 % (ref 0–13.4)
NEUTROPHILS # BLD AUTO: 6.11 K/UL (ref 2–7.15)
NEUTROPHILS NFR BLD: 72.5 % (ref 44–72)
NITRITE UR QL STRIP.AUTO: NEGATIVE
NRBC # BLD AUTO: 0 K/UL
NRBC BLD-RTO: 0 /100 WBC
PH UR STRIP.AUTO: 7.5 [PH] (ref 5–8)
PLATELET # BLD AUTO: 167 K/UL (ref 164–446)
PMV BLD AUTO: 8.4 FL (ref 9–12.9)
POTASSIUM SERPL-SCNC: 4.2 MMOL/L (ref 3.6–5.5)
PROT SERPL-MCNC: 6.4 G/DL (ref 6–8.2)
PROT UR QL STRIP: NEGATIVE MG/DL
RBC # BLD AUTO: 4.29 M/UL (ref 4.2–5.4)
RBC # URNS HPF: ABNORMAL /HPF
RBC UR QL AUTO: ABNORMAL
SODIUM SERPL-SCNC: 140 MMOL/L (ref 135–145)
SP GR UR STRIP.AUTO: 1.02
TROPONIN T SERPL-MCNC: <6 NG/L (ref 6–19)
UROBILINOGEN UR STRIP.AUTO-MCNC: 0.2 MG/DL
WBC # BLD AUTO: 8.4 K/UL (ref 4.8–10.8)
WBC #/AREA URNS HPF: ABNORMAL /HPF

## 2020-07-12 PROCEDURE — 87077 CULTURE AEROBIC IDENTIFY: CPT

## 2020-07-12 PROCEDURE — 96376 TX/PRO/DX INJ SAME DRUG ADON: CPT

## 2020-07-12 PROCEDURE — 93005 ELECTROCARDIOGRAM TRACING: CPT | Performed by: EMERGENCY MEDICINE

## 2020-07-12 PROCEDURE — 71045 X-RAY EXAM CHEST 1 VIEW: CPT

## 2020-07-12 PROCEDURE — 87186 SC STD MICRODIL/AGAR DIL: CPT

## 2020-07-12 PROCEDURE — 84484 ASSAY OF TROPONIN QUANT: CPT

## 2020-07-12 PROCEDURE — 87086 URINE CULTURE/COLONY COUNT: CPT

## 2020-07-12 PROCEDURE — 700111 HCHG RX REV CODE 636 W/ 250 OVERRIDE (IP): Performed by: EMERGENCY MEDICINE

## 2020-07-12 PROCEDURE — 700105 HCHG RX REV CODE 258: Performed by: EMERGENCY MEDICINE

## 2020-07-12 PROCEDURE — 700117 HCHG RX CONTRAST REV CODE 255: Performed by: EMERGENCY MEDICINE

## 2020-07-12 PROCEDURE — 83690 ASSAY OF LIPASE: CPT

## 2020-07-12 PROCEDURE — 99285 EMERGENCY DEPT VISIT HI MDM: CPT

## 2020-07-12 PROCEDURE — 74177 CT ABD & PELVIS W/CONTRAST: CPT

## 2020-07-12 PROCEDURE — 80053 COMPREHEN METABOLIC PANEL: CPT

## 2020-07-12 PROCEDURE — 96374 THER/PROPH/DIAG INJ IV PUSH: CPT | Mod: XU

## 2020-07-12 PROCEDURE — 85025 COMPLETE CBC W/AUTO DIFF WBC: CPT

## 2020-07-12 PROCEDURE — 81001 URINALYSIS AUTO W/SCOPE: CPT

## 2020-07-12 PROCEDURE — 96375 TX/PRO/DX INJ NEW DRUG ADDON: CPT

## 2020-07-12 PROCEDURE — 83605 ASSAY OF LACTIC ACID: CPT

## 2020-07-12 RX ORDER — HYDROCODONE BITARTRATE AND ACETAMINOPHEN 5; 325 MG/1; MG/1
1 TABLET ORAL EVERY 6 HOURS PRN
Qty: 12 TAB | Refills: 0 | Status: SHIPPED | OUTPATIENT
Start: 2020-07-12 | End: 2020-07-15

## 2020-07-12 RX ORDER — MORPHINE SULFATE 4 MG/ML
2 INJECTION, SOLUTION INTRAMUSCULAR; INTRAVENOUS ONCE
Status: COMPLETED | OUTPATIENT
Start: 2020-07-12 | End: 2020-07-12

## 2020-07-12 RX ORDER — CEFDINIR 300 MG/1
300 CAPSULE ORAL 2 TIMES DAILY
Qty: 14 CAP | Refills: 0 | Status: SHIPPED | OUTPATIENT
Start: 2020-07-12 | End: 2020-07-19

## 2020-07-12 RX ORDER — FAMOTIDINE 20 MG/1
20 TABLET, FILM COATED ORAL 2 TIMES DAILY
Qty: 20 TAB | Refills: 0 | Status: SHIPPED | OUTPATIENT
Start: 2020-07-12 | End: 2020-08-31

## 2020-07-12 RX ORDER — ONDANSETRON 2 MG/ML
4 INJECTION INTRAMUSCULAR; INTRAVENOUS ONCE
Status: COMPLETED | OUTPATIENT
Start: 2020-07-12 | End: 2020-07-12

## 2020-07-12 RX ORDER — SODIUM CHLORIDE 9 MG/ML
500 INJECTION, SOLUTION INTRAVENOUS ONCE
Status: COMPLETED | OUTPATIENT
Start: 2020-07-12 | End: 2020-07-12

## 2020-07-12 RX ORDER — TAMSULOSIN HYDROCHLORIDE 0.4 MG/1
0.4 CAPSULE ORAL DAILY
Qty: 30 CAP | Refills: 0 | Status: SHIPPED | OUTPATIENT
Start: 2020-07-12 | End: 2020-08-31

## 2020-07-12 RX ORDER — NAPROXEN 500 MG/1
500 TABLET ORAL 2 TIMES DAILY PRN
Qty: 20 TAB | Refills: 0 | Status: SHIPPED | OUTPATIENT
Start: 2020-07-12 | End: 2020-08-31

## 2020-07-12 RX ORDER — KETOROLAC TROMETHAMINE 30 MG/ML
15 INJECTION, SOLUTION INTRAMUSCULAR; INTRAVENOUS ONCE
Status: COMPLETED | OUTPATIENT
Start: 2020-07-12 | End: 2020-07-12

## 2020-07-12 RX ADMIN — SODIUM CHLORIDE 500 ML: 9 INJECTION, SOLUTION INTRAVENOUS at 11:27

## 2020-07-12 RX ADMIN — ONDANSETRON 4 MG: 2 INJECTION INTRAMUSCULAR; INTRAVENOUS at 11:26

## 2020-07-12 RX ADMIN — IOHEXOL 70 ML: 350 INJECTION, SOLUTION INTRAVENOUS at 12:50

## 2020-07-12 RX ADMIN — KETOROLAC TROMETHAMINE 15 MG: 30 INJECTION, SOLUTION INTRAMUSCULAR at 13:38

## 2020-07-12 RX ADMIN — MORPHINE SULFATE 2 MG: 4 INJECTION INTRAVENOUS at 12:09

## 2020-07-12 RX ADMIN — MORPHINE SULFATE 2 MG: 4 INJECTION INTRAVENOUS at 11:26

## 2020-07-12 ASSESSMENT — FIBROSIS 4 INDEX: FIB4 SCORE: 2.96

## 2020-07-12 NOTE — ED NOTES
"Pt states \"peed  Normally today and pooped yesterday\". Pt is pointing at mid abdominal area and then upper to lower back area for pain.   "

## 2020-07-12 NOTE — DOCUMENTATION QUERY
RN notified that IV infiltrated at end of exam.  Approx 5mL of contrast.  Heat pack applied to infiltration site.

## 2020-07-12 NOTE — ED PROVIDER NOTES
"ED Provider Note    Scribed for Jack Vang M.D. by Danay Hammond. 7/12/2020, 11:04 AM.    Primary care provider: Adria Thomas M.D.  Means of arrival: Walk-In  History obtained from: Patient  History limited by: None    CHIEF COMPLAINT  Chief Complaint   Patient presents with   • Abdominal Pain     BIB family member for \"back pain and abdominal pain that is worse today and feeling weaker than normal and more tired\" denies any recent travel or exposure to known covid.    • Back Pain     Pain started suddenly at [place] today and stated to get worse.  NO vomiting or nausea      HPI  Shirley Coffey is a 75 y.o. female who presents to the Emergency Department complaining of sudden central lower left abdominal pain and lower back pain since this morning. Associated symptoms include nausea. Denies dysuria, hematuria, hematochezia, cough, shortness of breath, or chest pain. Patient denies any pertinent past medical history, such as kidney stones or MI, and other than medication for arthritis she takes no daily medications. Denies history of abdominal surgeries. No modifying factors noted.     REVIEW OF SYSTEMS  Pertinent positives include abdominal pain, nausea, back pain. Pertinent negatives include no dysuria, hematuria, hematochezia, cough, shortness of breath, or chest pain. All other systems reviewed and negative.    PAST MEDICAL HISTORY  Patient has a past medical history of Arthritis, Atherosclerosis of both carotid arteries, Dementia (HCC), Macular pucker, left eye, Memory loss, Mixed hyperlipidemia with apolipoprotein E4 variant, and Vitamin D deficiency disease.    SURGICAL HISTORY  Patient has a past surgical history that includes tonsillectomy; appendectomy; and hysterectomy, vaginal.    SOCIAL HISTORY  Social History     Tobacco Use   • Smoking status: Never Smoker   • Smokeless tobacco: Never Used   Substance Use Topics   • Alcohol use: No     Alcohol/week: 0.0 oz   • Drug use: No      Social " "History     Substance and Sexual Activity   Drug Use No       FAMILY HISTORY  Family History   Problem Relation Age of Onset   • Dementia Father    • Cancer Sister 75        Breast Cancer       CURRENT MEDICATIONS  No current facility-administered medications for this encounter.      ALLERGIES  No Known Allergies    PHYSICAL EXAM  VITAL SIGNS: /72   Pulse (!) 46   Temp (!) 35.6 °C (96 °F) (Temporal)   Resp (!) 26   Ht 1.575 m (5' 2\")   Wt 43.1 kg (95 lb)   SpO2 98%   BMI 17.38 kg/m²     Constitutional: Well developed, Well nourished, Moderate distress, Non-toxic appearance.   HENT: Normocephalic, Atraumatic, TMs normal, mucous membranes moist, no erythema, exudates, swelling, or masses, nares patent  Eyes: nonicteric  Neck: Supple, no meningismus  Lymphatic: No lymphadenopathy noted.   Cardiovascular: Bradycardic rate and rhythm, no gallops rubs or murmurs  Lungs: Clear bilaterally   Abdomen: Tenderness in the mid to lower abdomen, more towards the left side. Voluntary guarding, no rebound. Bowel sounds normal, Soft  Skin: Pale appearing. Warm, Dry, no rash  Back: No tenderness, No CVA tenderness.   Genitalia: Deferred  Rectal: Deferred  Extremities: No edema  Neurologic: Alert, appropriate, follows commands, moving all extremities, normal speech   Psychiatric: Affect normal      DIAGNOSTIC STUDIES / PROCEDURES    LABS  Results for orders placed or performed during the hospital encounter of 07/12/20   CBC with Differential   Result Value Ref Range    WBC 8.4 4.8 - 10.8 K/uL    RBC 4.29 4.20 - 5.40 M/uL    Hemoglobin 14.2 12.0 - 16.0 g/dL    Hematocrit 42.7 37.0 - 47.0 %    MCV 99.5 (H) 81.4 - 97.8 fL    MCH 33.1 (H) 27.0 - 33.0 pg    MCHC 33.3 (L) 33.6 - 35.0 g/dL    RDW 44.5 35.9 - 50.0 fL    Platelet Count 167 164 - 446 K/uL    MPV 8.4 (L) 9.0 - 12.9 fL    Neutrophils-Polys 72.50 (H) 44.00 - 72.00 %    Lymphocytes 21.00 (L) 22.00 - 41.00 %    Monocytes 4.90 0.00 - 13.40 %    Eosinophils 0.60 0.00 - " 6.90 %    Basophils 0.50 0.00 - 1.80 %    Immature Granulocytes 0.50 0.00 - 0.90 %    Nucleated RBC 0.00 /100 WBC    Neutrophils (Absolute) 6.11 2.00 - 7.15 K/uL    Lymphs (Absolute) 1.77 1.00 - 4.80 K/uL    Monos (Absolute) 0.41 0.00 - 0.85 K/uL    Eos (Absolute) 0.05 0.00 - 0.51 K/uL    Baso (Absolute) 0.04 0.00 - 0.12 K/uL    Immature Granulocytes (abs) 0.04 0.00 - 0.11 K/uL    NRBC (Absolute) 0.00 K/uL   Complete Metabolic Panel (CMP)   Result Value Ref Range    Sodium 140 135 - 145 mmol/L    Potassium 4.2 3.6 - 5.5 mmol/L    Chloride 106 96 - 112 mmol/L    Co2 23 20 - 33 mmol/L    Anion Gap 11.0 7.0 - 16.0    Glucose 109 (H) 65 - 99 mg/dL    Bun 25 (H) 8 - 22 mg/dL    Creatinine 0.89 0.50 - 1.40 mg/dL    Calcium 10.1 8.5 - 10.5 mg/dL    AST(SGOT) 28 12 - 45 U/L    ALT(SGPT) 19 2 - 50 U/L    Alkaline Phosphatase 78 30 - 99 U/L    Total Bilirubin 0.4 0.1 - 1.5 mg/dL    Albumin 3.9 3.2 - 4.9 g/dL    Total Protein 6.4 6.0 - 8.2 g/dL    Globulin 2.5 1.9 - 3.5 g/dL    A-G Ratio 1.6 g/dL   Troponin   Result Value Ref Range    Troponin T <6 6 - 19 ng/L   LIPASE   Result Value Ref Range    Lipase 32 11 - 82 U/L   LACTIC ACID   Result Value Ref Range    Lactic Acid 2.3 (H) 0.5 - 2.0 mmol/L   ESTIMATED GFR   Result Value Ref Range    GFR If African American >60 >60 mL/min/1.73 m 2    GFR If Non African American >60 >60 mL/min/1.73 m 2   EKG   Result Value Ref Range    Report       [facility] Emergency Dept.    Test Date:  2020  Pt Name:    BRITT PARIKH                Department: ER  MRN:        [id number]                      Room:       RD 06  Gender:     Female                       Technician: KEO HENRY:        [id number]                   Requested By:ER TRIAGE PROTOCOL  Order #:    [id number]                    Reading MD:    Measurements  Intervals                                Axis  Rate:       46                           P:          83  NV:         160                          QRS:         77  QRSD:       72                           T:          75  QT:         476  QTc:        417    Interpretive Statements  SINUS BRADYCARDIA  CONSIDER LEFT ATRIAL ABNORMALITY  BORDERLINE ST ELEVATION, INFERIOR LEADS  No previous ECG available for comparison       All labs reviewed by me.    EKG  Obtained at 10:57 AM   Bradycardic rhythm   Rate 46  Trace ST elevation in V2 and V3  Biphasic T waves in anterior leads, otherwise no reciprocal change  No ST T segment change. No ectopy.      RADIOLOGY  CT-ABDOMEN-PELVIS WITH   Final Result      1.  There is new left-sided hydronephrosis and hydroureter. 2 mm obstructing calculus appears to be present at the left UVJ.      2.  Liver masses are again identified. These appear to demonstrate peripheral nodular enhancement consistent with cavernous hemangiomas.         DX-CHEST-PORTABLE (1 VIEW)   Final Result         No acute cardiac or pulmonary abnormality is identified.        The radiologist's interpretation of all radiological studies have been reviewed by me.    COURSE & MEDICAL DECISION MAKING  Nursing notes, VS, PMSFHx reviewed in chart.     11:04 AM - Patient seen and examined at bedside. Discussed plan of care with patient and spouse which includes symptomatic treatment and CT abdomen to rule out kidney stone or obstruction. Patient is requesting pain medication at this time. Ordered for CT-abdomen-pelvis, DX-chest, lipase, lactic acid, urinalysis, CBC with differential, CMP, troponin and EKG to evaluate. Patient was treated with morphine injection 2 mg, Zofran 4 mg and IV fluids for her symptoms.     HYDRATION: Based on the patient's presentation of Inability to take oral fluids/n.p.o. status the patient was given IV fluids. IV Hydration was used because oral hydration was not adequate alone. Upon recheck following hydration, the patient was improved.    11:08 AM - Performed bedside ultrasound indicating normal aorta on caliber.     1:09 PM - Recheck. Patient  appears improved. Nurses will do a cath urine to prevent contamination. She is waiting to receive Toradol for her pain.     Decision Making:  This is a 75 y.o. year old female who presents with ureterolithiasis-the patient has a 2 mm stone at the left UVJ.  There is some hydronephrosis and hydroureter.  The patient appears more comfortable after analgesics were administered here.  Otherwise, CT demonstrates no other intra-abdominal pathology outside of what appears to be some hemangiomas in the liver.  Patient has a borderline lactate of 2.3 but I do not strongly suspect sepsis.  EKG and troponin were obtained given the abdominal pain on the patient's age however troponin is negative and the EKG outside of bradycardia does not demonstrate any acute ischemic change.  The urine demonstrates no nitrites or leukoesterase-there are many bacteria so the patient will be covered with antibiotics.  Patient will follow-up with Dr. Anthony.  She was instructed to return for any fever vomiting recurrent pain or other concerns.    FINAL IMPRESSION  1. Ureterolithiasis          Danay DIAZ (Eliseo), am scribing for, and in the presence of, Jack Vang M.D..    Electronically signed by: Danay Hammond (Eliseo), 7/12/2020    Jack DIAZ M.D. personally performed the services described in this documentation, as scribed by Danay Hammond in my presence, and it is both accurate and complete. C    The note accurately reflects work and decisions made by me.  Jack Vang M.D.  7/12/2020  1:32 PM

## 2020-07-12 NOTE — DISCHARGE INSTRUCTIONS
Follow-up with urology.  Return for new or concerning symptoms to include pain any fever persistent vomiting etc.

## 2020-07-12 NOTE — ED NOTES
Rn assist; pt discharged; Pt verbalized understanding of discharge instructions as well as medication education. Pt brought to lobby via wheelchair by [facility] for transportation home via spouse.

## 2020-07-12 NOTE — ED TRIAGE NOTES
"Chief Complaint   Patient presents with   • Abdominal Pain     BIB family member for \"back pain and abdominal pain that is worse today and feeling weaker than normal and more tired\" denies any recent travel or exposure to known covid.    • Back Pain     Pain started suddenly at [place] today and stated to get worse.  NO vomiting or nausea        "

## 2020-07-14 ENCOUNTER — TELEPHONE (OUTPATIENT)
Dept: MEDICAL GROUP | Facility: MEDICAL CENTER | Age: 75
End: 2020-07-14

## 2020-07-14 LAB
BACTERIA UR CULT: ABNORMAL
BACTERIA UR CULT: ABNORMAL
SIGNIFICANT IND 70042: ABNORMAL
SITE SITE: ABNORMAL
SOURCE SOURCE: ABNORMAL

## 2020-07-14 NOTE — TELEPHONE ENCOUNTER
Mail order pharmacy called and they are requesting a refill on patient's Vitamin D 2,000 IU. Epic will not let me que up rx.

## 2020-08-18 ENCOUNTER — HOSPITAL ENCOUNTER (OUTPATIENT)
Dept: RADIOLOGY | Facility: MEDICAL CENTER | Age: 75
End: 2020-08-18
Attending: UROLOGY
Payer: COMMERCIAL

## 2020-08-18 DIAGNOSIS — N13.30 HYDRONEPHROSIS, UNSPECIFIED HYDRONEPHROSIS TYPE: ICD-10-CM

## 2020-08-18 PROCEDURE — 76775 US EXAM ABDO BACK WALL LIM: CPT

## 2020-08-31 ENCOUNTER — OFFICE VISIT (OUTPATIENT)
Dept: MEDICAL GROUP | Facility: MEDICAL CENTER | Age: 75
End: 2020-08-31
Payer: COMMERCIAL

## 2020-08-31 VITALS
SYSTOLIC BLOOD PRESSURE: 110 MMHG | HEART RATE: 84 BPM | HEIGHT: 64 IN | WEIGHT: 100.09 LBS | BODY MASS INDEX: 17.09 KG/M2 | OXYGEN SATURATION: 97 % | TEMPERATURE: 97.8 F | DIASTOLIC BLOOD PRESSURE: 64 MMHG

## 2020-08-31 DIAGNOSIS — F03.90 DEMENTIA WITHOUT BEHAVIORAL DISTURBANCE, UNSPECIFIED DEMENTIA TYPE: Chronic | ICD-10-CM

## 2020-08-31 PROBLEM — Z87.442 HISTORY OF KIDNEY STONES: Status: ACTIVE | Noted: 2020-08-31

## 2020-08-31 PROCEDURE — 99213 OFFICE O/P EST LOW 20 MIN: CPT | Performed by: INTERNAL MEDICINE

## 2020-08-31 RX ORDER — ROSUVASTATIN CALCIUM 20 MG/1
TABLET, COATED ORAL
COMMUNITY
Start: 2020-08-25 | End: 2020-10-11

## 2020-08-31 RX ORDER — RISPERIDONE 1 MG/1
1 TABLET ORAL 2 TIMES DAILY
Qty: 180 TAB | Refills: 3 | Status: SHIPPED | OUTPATIENT
Start: 2020-08-31 | End: 2020-12-27

## 2020-08-31 RX ORDER — RIVASTIGMINE TARTRATE 4.5 MG/1
4.5 CAPSULE ORAL 2 TIMES DAILY
COMMUNITY
Start: 2020-08-05 | End: 2022-03-29

## 2020-08-31 ASSESSMENT — FIBROSIS 4 INDEX: FIB4 SCORE: 2.81

## 2020-08-31 NOTE — PROGRESS NOTES
CC: Follow-up dementia.                                                                                                                                      HPI:   Shirley presents today with the following.    1. Dementia without behavioral disturbance, unspecified dementia type (HCC)  Presents with persistent difficulty in memory she lost her neurologist will need to reestablish.   reports that hallucinations are improving.  She is needing refills of her risperidone.  No other new complaints otherwise doing well.  Recent kidney stones no further issues.      Patient Active Problem List    Diagnosis Date Noted   • History of kidney stones 08/31/2020   • Hallucination 04/08/2020   • Hyperlipemia 12/31/2019   • Menopausal and postmenopausal disorder 12/31/2019   • Osteopenia of multiple sites 11/01/2018   • Dementia (HCC) 09/02/2015   • History of sciatica 06/23/2014   • Macular pucker, left eye    • Atherosclerosis of both carotid arteries    • Dyslipidemia 04/02/2013       Current Outpatient Medications   Medication Sig Dispense Refill   • rivastigmine (EXELON) 4.5 MG capsule      • rosuvastatin (CRESTOR) 20 MG Tab      • Aspirin Buf,CaCarb-MgCarb-MgO, 81 MG Tab Take  by mouth.     • risperiDONE (RISPERDAL) 1 MG Tab Take 1 Tab by mouth 2 times a day. 180 Tab 3   • estrogens, conjugated (PREMARIN) 0.625 MG/GM Cream Insert 1 g in vagina every 7 days. 1 Tube 11   • therapeutic multivitamin-minerals (THERAGRAN-M) Tab Take 1 Tab by mouth every day. 90 Tab 3   • aspirin EC (ECOTRIN) 81 MG Tablet Delayed Response Take 1 Tab by mouth every day. 30 Tab 6   • Cholecalciferol (VITAMIN D) 1000 UNIT CAPS Take 1 Cap by mouth every day.       No current facility-administered medications for this visit.          Allergies as of 08/31/2020   • (No Known Allergies)        ROS: Denies Chest pain, SOB, LE edema.    /64 (BP Location: Left arm, Patient Position: Sitting)   Pulse 84   Temp 36.6 °C (97.8 °F)   Ht 1.626 m  "(5' 4\")   Wt 45.4 kg (100 lb 1.4 oz)   SpO2 97%   BMI 17.18 kg/m²     Physical Exam:  Gen:         Alert and oriented, No apparent distress.  Neck:        No Lymphadenopathy or Bruits.  Lungs:     Clear to auscultation bilaterally  CV:          Regular rate and rhythm. No murmurs, rubs or gallops.               Ext:          No clubbing, cyanosis, edema.      Assessment and Plan.   75 y.o. female with the following issues.    1. Dementia without behavioral disturbance, unspecified dementia type (HCC)  Refilled medications follow along with neurology.      "

## 2020-09-07 RX ORDER — TAMSULOSIN HYDROCHLORIDE 0.4 MG/1
CAPSULE ORAL
Qty: 30 CAP | Refills: 0 | Status: SHIPPED | OUTPATIENT
Start: 2020-09-07 | End: 2020-10-06

## 2020-10-06 RX ORDER — TAMSULOSIN HYDROCHLORIDE 0.4 MG/1
CAPSULE ORAL
Qty: 30 CAP | Refills: 3 | Status: SHIPPED | OUTPATIENT
Start: 2020-10-06 | End: 2020-10-14

## 2020-10-11 RX ORDER — ROSUVASTATIN CALCIUM 20 MG/1
TABLET, COATED ORAL
Qty: 90 TAB | Refills: 3 | Status: SHIPPED | OUTPATIENT
Start: 2020-10-11 | End: 2022-03-29

## 2020-10-14 ENCOUNTER — OFFICE VISIT (OUTPATIENT)
Dept: MEDICAL GROUP | Facility: MEDICAL CENTER | Age: 75
End: 2020-10-14
Payer: COMMERCIAL

## 2020-10-14 VITALS
HEIGHT: 64 IN | DIASTOLIC BLOOD PRESSURE: 62 MMHG | HEART RATE: 63 BPM | WEIGHT: 100 LBS | TEMPERATURE: 98 F | BODY MASS INDEX: 17.07 KG/M2 | OXYGEN SATURATION: 99 % | SYSTOLIC BLOOD PRESSURE: 100 MMHG

## 2020-10-14 DIAGNOSIS — F03.90 DEMENTIA WITHOUT BEHAVIORAL DISTURBANCE, UNSPECIFIED DEMENTIA TYPE: Chronic | ICD-10-CM

## 2020-10-14 PROCEDURE — 99213 OFFICE O/P EST LOW 20 MIN: CPT | Performed by: INTERNAL MEDICINE

## 2020-10-14 RX ORDER — MEMANTINE HYDROCHLORIDE 5 MG/1
5 TABLET ORAL 2 TIMES DAILY
COMMUNITY
End: 2020-11-23 | Stop reason: SDUPTHER

## 2020-10-14 ASSESSMENT — FIBROSIS 4 INDEX: FIB4 SCORE: 2.81

## 2020-11-23 ENCOUNTER — OFFICE VISIT (OUTPATIENT)
Dept: NEUROLOGY | Facility: MEDICAL CENTER | Age: 75
End: 2020-11-23
Payer: COMMERCIAL

## 2020-11-23 VITALS
DIASTOLIC BLOOD PRESSURE: 63 MMHG | HEART RATE: 71 BPM | OXYGEN SATURATION: 95 % | SYSTOLIC BLOOD PRESSURE: 95 MMHG | RESPIRATION RATE: 16 BRPM | BODY MASS INDEX: 16.73 KG/M2 | TEMPERATURE: 98.1 F | WEIGHT: 98 LBS | HEIGHT: 64 IN

## 2020-11-23 DIAGNOSIS — F03.90 DEMENTIA WITHOUT BEHAVIORAL DISTURBANCE, UNSPECIFIED DEMENTIA TYPE: Chronic | ICD-10-CM

## 2020-11-23 DIAGNOSIS — R44.3 HALLUCINATION: ICD-10-CM

## 2020-11-23 DIAGNOSIS — G20.C PARKINSONISM, UNSPECIFIED PARKINSONISM TYPE (HCC): ICD-10-CM

## 2020-11-23 PROCEDURE — 99215 OFFICE O/P EST HI 40 MIN: CPT | Performed by: PSYCHIATRY & NEUROLOGY

## 2020-11-23 RX ORDER — MEMANTINE HYDROCHLORIDE 5 MG/1
5 TABLET ORAL 2 TIMES DAILY
Qty: 180 TAB | Refills: 3 | Status: SHIPPED | OUTPATIENT
Start: 2020-11-23 | End: 2022-03-29

## 2020-11-23 ASSESSMENT — ENCOUNTER SYMPTOMS
WEIGHT LOSS: 0
ABDOMINAL PAIN: 0
HALLUCINATIONS: 1
FALLS: 0
SORE THROAT: 0
FEVER: 0
EYE DISCHARGE: 0
SHORTNESS OF BREATH: 0
BRUISES/BLEEDS EASILY: 0

## 2020-11-23 ASSESSMENT — FIBROSIS 4 INDEX: FIB4 SCORE: 2.81

## 2020-11-23 NOTE — PROGRESS NOTES
Chief Complaint   Patient presents with   • New Patient     dementia     Patient is referred by Adria Thomas M.D. for initial consult.    History of present illness:  Shirley Coffey 75 y.o. female presents today for memory loss.   History is obtained from significant other.  and Patient is accompanied by  Denny.  Patient provides minimal history and does not answer questions.   45 years.    Duration/timing: Gradual onset approximately 2018 or possibly sooner, progressive  Context:   Memory loss: Described as difficulty remembering words, conversations, appointments involving into profound cognitive decline.  Father with a history of dementia starting in the early 90s. Currently, she doesn't know where bathroom is in house she has lived in it for 20 years. Doesn't know how to open the fridge. Difficulty dressing herself. Forgets name of her  who is her sole caretaker. More recently since spring 2020 she has been having more shuffling walk and instability, avoids eye contact when speaking with someone, leans over and walks leaning forward, soft speech, talks to non existent people. Sleeps well throughout the night. Hallucinations are not bothersome to her.  Excessive daytime sleepiness.   Baseline: She does not drive - no DL. Needs help with ADLs (dressing) and IADLs. She can bath herself with  help adjusting water temp. Highest education: HS. Occupation: [employer], office work.   Location: brain  Quality: memory loss  Severity: severe  Modifying factors: worse with time  Associated signs/symptoms: Agitation and hallucinations may have proceeded memory loss?  is uncertain   Denies: bladder incontinence, bowel incontinence, headaches, vision changes/loss or diplopia, weakness, numbness/tingling, swallowing difficulties, speech disturbance, depression, anxiety, hearing loss, loss of consciousness, REM behavior disorder, seizures, abnormal movements, falls and HIV or syphilis  risk factors sleep disturbance, changes in appetite,     Patient has tried:  -Memantine - ran out refills and stopped refill, no benefit but no side effects restarted by me in 2020  -Zoloft - never tried  -Attempt to limit risperidone - Dr. Cuello psychiatrist, seen intermittently, for hallucinations - reportedly started about 2020 but was documented in Dr. MANRIQUE note in 2020  -Exelon 4.5mg - no difference     MRI brain and EEG was not obtained ordered 2020.  Had eye exam at [facility] pending report.      Past medical history:   Past Medical History:   Diagnosis Date   • Arthritis    • Atherosclerosis of both carotid arteries    • Dementia (HCC)    • Macular pucker, left eye    • Memory loss    • Mixed hyperlipidemia with apolipoprotein E4 variant    • Vitamin D deficiency disease        Past surgical history:   Past Surgical History:   Procedure Laterality Date   • APPENDECTOMY     • HYSTERECTOMY, VAGINAL      No oophorectomy   • TONSILLECTOMY         Family history:   Family History   Problem Relation Age of Onset   • Dementia Father    • Cancer Sister 75        Breast Cancer   Mom  MVA at age 80s-90s.    Social history:   Tobacco Use   • Smoking status: Never Smoker   • Smokeless tobacco: Never Used   Substance and Sexual Activity   • Alcohol use: No     Alcohol/week: 0.0 oz   • Drug use: No   • Sexual activity: Yes     Partners: Male       Current medications:   Current Outpatient Medications   Medication   • memantine (NAMENDA) 5 MG Tab   • rosuvastatin (CRESTOR) 20 MG Tab   • rivastigmine (EXELON) 4.5 MG capsule   • Aspirin Buf,CaCarb-MgCarb-MgO, 81 MG Tab   • risperiDONE (RISPERDAL) 1 MG Tab   • therapeutic multivitamin-minerals (THERAGRAN-M) Tab   • Cholecalciferol (VITAMIN D) 1000 UNIT CAPS     No current facility-administered medications for this visit.        Medication Allergy:  No Known Allergies    Review of Systems   Constitutional: Negative for fever and weight loss.   HENT:  "Negative for sore throat.    Eyes: Negative for discharge.   Respiratory: Negative for shortness of breath.    Cardiovascular: Negative for leg swelling.   Gastrointestinal: Negative for abdominal pain.   Genitourinary: Negative for dysuria.   Musculoskeletal: Negative for falls.   Skin: Negative for rash.   Neurological:        As per HPI   Endo/Heme/Allergies: Does not bruise/bleed easily.   Psychiatric/Behavioral: Positive for hallucinations.       Physical examination:   Vitals:    11/23/20 1104   BP: (!) 95/63   BP Location: Left arm   Patient Position: Sitting   BP Cuff Size: Adult   Pulse: 71   Resp: 16   Temp: 36.7 °C (98.1 °F)   TempSrc: Temporal   SpO2: 95%   Weight: 44.5 kg (98 lb)   Height: 1.626 m (5' 4\")     General: Patient in well nourished in no apparent distress. She sits still with mask almost covering her eyes.   Eyes: Ophthalmoscopic examination performed but discs cannot be visualized well enough to characterize bilaterally.  HENT: Normocephalic, atraumatic.   Cardiovascular: No lower extremity edema.  Respiratory: Normal respiratory effort.   Skin: No appreciable signs of acute rashes or bruising.   Musculoskeletal: No signs of joint or muscle swelling.   Psychiatric: Pleasant.      NEUROLOGICAL EXAM:   Mental status: Awake, oriented to person. Decreased attention. MOCA 12/30 (4/2020 with Dr. MANRIQUE). She closes her eyes and looks like she is dozing off intermittently.  There is no evidence of apraxia in the arms.  Speech and language: Speech is  Hypophonic.   Cranial nerve exam:  II: Pupils are equally round and reactive to light. Visual fields are intact by confrontation.  III, IV, VI:  no diplopia, no ptosis.  Patient has difficulty with upgaze.  V: Sensation to light touch is normal over V1-3 distributions bilaterally.  .  VII: Limited by patient participation but no clear asymmetry at baseline.  VIII: Hearing intact to soft speech and finger rub bilaterally  IX: Palate visualized.  Dysarthria " is not present.  XI: Shoulder shrug are symmetrical and strong.   XII: Tongue protrudes midline.    Motor exam:  Muscle tone is increased in the bilateral upper extremities right greater than left.  There is some mild cogwheeling as well.  Difficulty getting patient to do rapid finger tapping.  There is reduced affect/bradyphrenia..  No appreciable tremor.    Muscle strength: Limited strength testing due to patient participation.  She seems to be moving all extremities equally.    Sensory exam:  Intact to Light touch in bilateral upper and lower extremity.    Reflexes:       Right  Left  Biceps   0/4  0/4  Triceps  0/4  0/4  Brachioradialis 0/4  0/4  Knee jerk  1/4  1/4  Ankle jerk  0/4  0/4   bilateral toes are downgoing to plantar stimulation..  Positive glabellar.  No evidence of palmomental or snout.    Coordination: shows a normal finger-nose-finger.  Difficulty following heel-to-shin.  Gait: Narrow based with stooped posture and reduced arm swing.  To slow and shuffling in nature.      ANCILLARY DATA REVIEWED:   Lab Data Review:  Lab Results   Component Value Date/Time    WBC 8.4 07/12/2020 11:05 AM    RBC 4.29 07/12/2020 11:05 AM    HEMOGLOBIN 14.2 07/12/2020 11:05 AM    HEMATOCRIT 42.7 07/12/2020 11:05 AM    MCV 99.5 (H) 07/12/2020 11:05 AM    MCH 33.1 (H) 07/12/2020 11:05 AM    MCHC 33.3 (L) 07/12/2020 11:05 AM    MPV 8.4 (L) 07/12/2020 11:05 AM    NEUTSPOLYS 72.50 (H) 07/12/2020 11:05 AM    LYMPHOCYTES 21.00 (L) 07/12/2020 11:05 AM    MONOCYTES 4.90 07/12/2020 11:05 AM    EOSINOPHILS 0.60 07/12/2020 11:05 AM    BASOPHILS 0.50 07/12/2020 11:05 AM      Lab Results   Component Value Date/Time    SODIUM 140 07/12/2020 11:05 AM    POTASSIUM 4.2 07/12/2020 11:05 AM    CHLORIDE 106 07/12/2020 11:05 AM    CO2 23 07/12/2020 11:05 AM    GLUCOSE 109 (H) 07/12/2020 11:05 AM    BUN 25 (H) 07/12/2020 11:05 AM    CREATININE 0.89 07/12/2020 11:05 AM     Lab Results   Component Value Date/Time    ASTSGOT 28 07/12/2020 1107     ALTSGPT 19 07/12/2020 1105    ALKPHOSPHAT 78 07/12/2020 1105    ALBUMIN 3.9 07/12/2020 1105     Vitamin B12 in 2018 425  Thyroid testing normal in 2020    Imaging:   MRI brain 2015:  1.  Age-related cerebral atrophy.   2. There are a few scattered punctate areas of of increased T2 signal intensity in the periventricular white matter consistent with areas of chronic ischemia, demyelination, or gliosis.    Records reviewed: There is a prior patient of Dr. MANRIQUE with a diagnosis of Alzheimer's disease with behavioral disturbance.  Impression at that time was likely advanced Alzheimer's disease.  Brain imaging and EEG was obtained.  She was started on memantine.  Plan was to have her follow-up at [facility].        ASSESSMENT AND PLAN:    1. Dementia without behavioral disturbance, unspecified dementia type (HCC): Patient with a significant neurocognitive disorder with a Pingree of 12 out of 30 in April 2020.  Today she is unable to draw clock for me and only scribbles on the paper.  She is also unable to provide any significant history.  Based on her clinical presentation and parkinsonism as well as early onset hallucinations this is concerning for probable Lewy body type dementia.  MRI brain would be helpful to evaluate for preservation of the temporal lobes and more occipital lobe features.  EEG also to evaluate for occipital lobe changes and possible seizures given her staring spells.  Other differential does include Parkinson's dementia.  Vitamin B12 and thyroid testing unremarkable the last 2 years.  No prior history of psychiatric disease.  - MR-BRAIN-W/O; Future  - REFERRAL TO NEURODIAGNOSTICS (EEG,EP,EMG/NCS/DBS) -EEG to evaluate for occipital lobe changes and slowing, evaluate staring spells  -Restart memantine (NAMENDA) 5 MG Tab; Take 1 Tab by mouth 2 times a day.  Dispense: 180 Tab; Refill: 3   -Continue Exelon  -Neuropsychological testing may be limited given her limited participation today -  reconsider on follow up  -Continue PT    2. Parkinsonism, unspecified Parkinsonism type (HCC): Uncertain etiology.  The history is not exactly clear in terms of when medication was initiated specifically risperidone.  Patient is exhibiting bradykinesia, rigidity.  Differential does include Lewy body dementia versus secondary to medication.  -Caution with regards to increase in Risperdal if this truly is Lewy body dementia as she is at risk for significant adverse side effects, this was discussed with   -Sinemet considered but seems like gait is the main issue though - she is in PT already    3. Hallucination: Patient with chronic visual hallucinations of uncertain etiology but seems to be related to her dementia.  Given the reportedly early onset of visual hallucinations this is suspicious for Lewy body dementia as documented above.  She is on risperidone. Recommend risks benefits discussion with psychiatry given risks of antipsychotics . Currently her hallucinations are not bothersome.  -Monitor clinically, consider risperidone wean?      FOLLOW-UP: Return in about 3 months (around 2/23/2021).  To review work-up  EDUCATION AND COUNSELING:  -I personally discussed the following with the patient:   Risks/benefits/side effects/alternatives of medication including but not limited to drowsiness, sedation, dizziness, increased risk for falls, cardiovascular effects (hypotension, cardiac arrhythmias, death), weight changes, GI side effects (gastritis, ulcers, bleeding, changes in appetite, pancreatitis, change in bowel habits), increased risk for depression, anxiety, suicide, psychosis and mood changes, avoid abrupt cessation of medication, abnormal movements and hallucinations. and Differential diagnosis and medical reasoning as above, prognosis and treatment expectations if this is LBD    The patient communicates understanding of the above and agrees that due to the complexity of his/her diagnosis, results of any  testing and further recommendations will typically be discussed/made during a face to face encounter in my office. The patient and/or family further understands it is their responsibility to keep proper follow up.     Disclaimer  This dictation was created using voice recognition software. I have made every reasonable attempt to avoid dictation errors, but this document may contain an error not identified before finalizing. If the error changes the accuracy of the document, I would appreciate it being brought to my attention. Thank you very much.     Elizabeth Monzon MD  Neurology  [facility]

## 2020-12-27 RX ORDER — RISPERIDONE 1 MG/1
TABLET ORAL
Qty: 60 TAB | Refills: 3 | Status: SHIPPED | OUTPATIENT
Start: 2020-12-27 | End: 2024-01-23

## 2020-12-29 ENCOUNTER — NON-PROVIDER VISIT (OUTPATIENT)
Dept: NEUROLOGY | Facility: MEDICAL CENTER | Age: 75
End: 2020-12-29
Attending: PSYCHIATRY & NEUROLOGY
Payer: COMMERCIAL

## 2020-12-29 DIAGNOSIS — F03.90 DEMENTIA WITHOUT BEHAVIORAL DISTURBANCE, UNSPECIFIED DEMENTIA TYPE: ICD-10-CM

## 2020-12-29 PROCEDURE — 95819 EEG AWAKE AND ASLEEP: CPT | Mod: 26 | Performed by: PSYCHIATRY & NEUROLOGY

## 2020-12-29 PROCEDURE — 95819 EEG AWAKE AND ASLEEP: CPT | Performed by: PSYCHIATRY & NEUROLOGY

## 2020-12-29 NOTE — PROCEDURES
ROUTINE VIDEO ELECTROENCEPHALOGRAM REPORT      REFERRING PROVIDER: Elizabeth Monzon MD  DOS: 12/29/2020 (total recording of 29 minutes)    INDICATION:  Shirley Coffey 75 y.o. female presenting with a history of dementia presenting with staring spells.   CURRENT ANTIEPILEPTIC REGIMEN: None    TECHNIQUE: 30 channel routine video electroencephalogram (EEG) was performed in accordance with the international 10-20 system. The study was reviewed in bipolar and referential montages. The recording examined the patient during wakeful and drowsy/sleep state(s).     DESCRIPTION OF RECORD:  A slow and disorganized rhythm of approximately 7 Hz.    The patient slept spontaneously during this recording, and adequate levels of stage II sleep, characterized by vertex waves and symmetric sleep spindles, were observed.       ACTIVATION PROCEDURES:   Hyperventilation was not performed.     Photic stimulation induced a symmetric occipital driving response.      ICTAL AND/OR INTERICTAL FINDINGS:   Intermittent ill-defined triphasic waves were appreciated in this study.  No focal or generalized epileptiform activity noted. No regional slowing was seen during this routine study.  No clinical events or seizures were reported or recorded during the study.     EKG: sampling of the EKG recording demonstrated sinus rhythm.       INTERPRETATION:  This is an abnormal video EEG due to the presence of intermittent ill-defined triphasic waves.       CLINICAL CORRELATION:  Diffuse slowing and triphasic waves are consistent with widespread cortical dysfunction/encephalopathy but is not etiologically specific. If the clinical suspicion remains high for seizures, a prolonged recording to capture clinical or subclinical events may be helpful.    Elizabeth Monzon MD  Neurology - Neurophysiology  [facility]

## 2020-12-30 ENCOUNTER — TELEPHONE (OUTPATIENT)
Dept: NEUROLOGY | Facility: MEDICAL CENTER | Age: 75
End: 2020-12-30

## 2020-12-30 NOTE — TELEPHONE ENCOUNTER
----- Message from Elizabeth Monzon M.D. sent at 12/30/2020  1:40 PM PST -----  Can you please let the patient/ know that the EEG did not show urgent findings? Findings were supportive of her diagnosis.

## 2021-01-07 ENCOUNTER — HOSPITAL ENCOUNTER (OUTPATIENT)
Dept: RADIOLOGY | Facility: MEDICAL CENTER | Age: 76
End: 2021-01-07
Attending: INTERNAL MEDICINE
Payer: COMMERCIAL

## 2021-01-07 DIAGNOSIS — Z12.31 VISIT FOR SCREENING MAMMOGRAM: ICD-10-CM

## 2021-01-07 PROCEDURE — 77063 BREAST TOMOSYNTHESIS BI: CPT

## 2021-01-14 ENCOUNTER — HOSPITAL ENCOUNTER (OUTPATIENT)
Facility: MEDICAL CENTER | Age: 76
End: 2021-01-15
Attending: EMERGENCY MEDICINE | Admitting: HOSPITALIST
Payer: MEDICARE

## 2021-01-14 ENCOUNTER — APPOINTMENT (OUTPATIENT)
Dept: RADIOLOGY | Facility: MEDICAL CENTER | Age: 76
End: 2021-01-14
Attending: EMERGENCY MEDICINE
Payer: MEDICARE

## 2021-01-14 PROBLEM — N39.0 UTI (URINARY TRACT INFECTION): Status: ACTIVE | Noted: 2021-01-14

## 2021-01-14 LAB
ALBUMIN SERPL BCP-MCNC: 4.8 G/DL (ref 3.2–4.9)
ALBUMIN/GLOB SERPL: 1.9 G/DL
ALP SERPL-CCNC: 114 U/L (ref 30–99)
ALT SERPL-CCNC: 65 U/L (ref 2–50)
ANION GAP SERPL CALC-SCNC: 11 MMOL/L (ref 7–16)
APPEARANCE UR: ABNORMAL
AST SERPL-CCNC: 36 U/L (ref 12–45)
BACTERIA #/AREA URNS HPF: ABNORMAL /HPF
BASOPHILS # BLD AUTO: 0.5 % (ref 0–1.8)
BASOPHILS # BLD: 0.03 K/UL (ref 0–0.12)
BILIRUB SERPL-MCNC: 0.8 MG/DL (ref 0.1–1.5)
BILIRUB UR QL STRIP.AUTO: NEGATIVE
BUN SERPL-MCNC: 21 MG/DL (ref 8–22)
CALCIUM SERPL-MCNC: 10.8 MG/DL (ref 8.5–10.5)
CHLORIDE SERPL-SCNC: 104 MMOL/L (ref 96–112)
CO2 SERPL-SCNC: 27 MMOL/L (ref 20–33)
COLOR UR: YELLOW
CREAT SERPL-MCNC: 0.78 MG/DL (ref 0.5–1.4)
EKG IMPRESSION: NORMAL
EOSINOPHIL # BLD AUTO: 0.03 K/UL (ref 0–0.51)
EOSINOPHIL NFR BLD: 0.5 % (ref 0–6.9)
EPI CELLS #/AREA URNS HPF: NEGATIVE /HPF
ERYTHROCYTE [DISTWIDTH] IN BLOOD BY AUTOMATED COUNT: 46.2 FL (ref 35.9–50)
GLOBULIN SER CALC-MCNC: 2.5 G/DL (ref 1.9–3.5)
GLUCOSE SERPL-MCNC: 98 MG/DL (ref 65–99)
GLUCOSE UR STRIP.AUTO-MCNC: NEGATIVE MG/DL
HCT VFR BLD AUTO: 42.6 % (ref 37–47)
HGB BLD-MCNC: 14.3 G/DL (ref 12–16)
HYALINE CASTS #/AREA URNS LPF: ABNORMAL /LPF
IMM GRANULOCYTES # BLD AUTO: 0.03 K/UL (ref 0–0.11)
IMM GRANULOCYTES NFR BLD AUTO: 0.5 % (ref 0–0.9)
KETONES UR STRIP.AUTO-MCNC: NEGATIVE MG/DL
LEUKOCYTE ESTERASE UR QL STRIP.AUTO: ABNORMAL
LYMPHOCYTES # BLD AUTO: 0.7 K/UL (ref 1–4.8)
LYMPHOCYTES NFR BLD: 11.8 % (ref 22–41)
MCH RBC QN AUTO: 32.8 PG (ref 27–33)
MCHC RBC AUTO-ENTMCNC: 33.6 G/DL (ref 33.6–35)
MCV RBC AUTO: 97.7 FL (ref 81.4–97.8)
MICRO URNS: ABNORMAL
MONOCYTES # BLD AUTO: 0.44 K/UL (ref 0–0.85)
MONOCYTES NFR BLD AUTO: 7.4 % (ref 0–13.4)
NEUTROPHILS # BLD AUTO: 4.7 K/UL (ref 2–7.15)
NEUTROPHILS NFR BLD: 79.3 % (ref 44–72)
NITRITE UR QL STRIP.AUTO: POSITIVE
NRBC # BLD AUTO: 0 K/UL
NRBC BLD-RTO: 0 /100 WBC
PH UR STRIP.AUTO: 7 [PH] (ref 5–8)
PLATELET # BLD AUTO: 103 K/UL (ref 164–446)
PMV BLD AUTO: 8.5 FL (ref 9–12.9)
POTASSIUM SERPL-SCNC: 3.9 MMOL/L (ref 3.6–5.5)
PROT SERPL-MCNC: 7.3 G/DL (ref 6–8.2)
PROT UR QL STRIP: NEGATIVE MG/DL
RBC # BLD AUTO: 4.36 M/UL (ref 4.2–5.4)
RBC # URNS HPF: ABNORMAL /HPF
RBC UR QL AUTO: ABNORMAL
SODIUM SERPL-SCNC: 142 MMOL/L (ref 135–145)
SP GR UR STRIP.AUTO: 1.02
UROBILINOGEN UR STRIP.AUTO-MCNC: 0.2 MG/DL
WBC # BLD AUTO: 5.9 K/UL (ref 4.8–10.8)
WBC #/AREA URNS HPF: ABNORMAL /HPF

## 2021-01-14 PROCEDURE — 70450 CT HEAD/BRAIN W/O DYE: CPT

## 2021-01-14 PROCEDURE — 85025 COMPLETE CBC W/AUTO DIFF WBC: CPT

## 2021-01-14 PROCEDURE — U0005 INFEC AGEN DETEC AMPLI PROBE: HCPCS

## 2021-01-14 PROCEDURE — 93005 ELECTROCARDIOGRAM TRACING: CPT | Performed by: EMERGENCY MEDICINE

## 2021-01-14 PROCEDURE — G0378 HOSPITAL OBSERVATION PER HR: HCPCS

## 2021-01-14 PROCEDURE — 700105 HCHG RX REV CODE 258: Performed by: EMERGENCY MEDICINE

## 2021-01-14 PROCEDURE — 81001 URINALYSIS AUTO W/SCOPE: CPT

## 2021-01-14 PROCEDURE — 700111 HCHG RX REV CODE 636 W/ 250 OVERRIDE (IP): Performed by: EMERGENCY MEDICINE

## 2021-01-14 PROCEDURE — 99285 EMERGENCY DEPT VISIT HI MDM: CPT

## 2021-01-14 PROCEDURE — 96365 THER/PROPH/DIAG IV INF INIT: CPT

## 2021-01-14 PROCEDURE — 87186 SC STD MICRODIL/AGAR DIL: CPT

## 2021-01-14 PROCEDURE — 71045 X-RAY EXAM CHEST 1 VIEW: CPT

## 2021-01-14 PROCEDURE — 87086 URINE CULTURE/COLONY COUNT: CPT

## 2021-01-14 PROCEDURE — A9270 NON-COVERED ITEM OR SERVICE: HCPCS | Performed by: STUDENT IN AN ORGANIZED HEALTH CARE EDUCATION/TRAINING PROGRAM

## 2021-01-14 PROCEDURE — 99220 PR INITIAL OBSERVATION CARE,LEVL III: CPT | Mod: GC | Performed by: HOSPITALIST

## 2021-01-14 PROCEDURE — U0003 INFECTIOUS AGENT DETECTION BY NUCLEIC ACID (DNA OR RNA); SEVERE ACUTE RESPIRATORY SYNDROME CORONAVIRUS 2 (SARS-COV-2) (CORONAVIRUS DISEASE [COVID-19]), AMPLIFIED PROBE TECHNIQUE, MAKING USE OF HIGH THROUGHPUT TECHNOLOGIES AS DESCRIBED BY CMS-2020-01-R: HCPCS

## 2021-01-14 PROCEDURE — 87077 CULTURE AEROBIC IDENTIFY: CPT

## 2021-01-14 PROCEDURE — 700102 HCHG RX REV CODE 250 W/ 637 OVERRIDE(OP): Performed by: STUDENT IN AN ORGANIZED HEALTH CARE EDUCATION/TRAINING PROGRAM

## 2021-01-14 PROCEDURE — 80053 COMPREHEN METABOLIC PANEL: CPT

## 2021-01-14 RX ORDER — ROSUVASTATIN CALCIUM 20 MG/1
20 TABLET, COATED ORAL EVERY MORNING
Status: DISCONTINUED | OUTPATIENT
Start: 2021-01-15 | End: 2021-01-15 | Stop reason: HOSPADM

## 2021-01-14 RX ORDER — TAMSULOSIN HYDROCHLORIDE 0.4 MG/1
0.4 CAPSULE ORAL EVERY MORNING
Status: DISCONTINUED | OUTPATIENT
Start: 2021-01-15 | End: 2021-01-15 | Stop reason: HOSPADM

## 2021-01-14 RX ORDER — RIVASTIGMINE TARTRATE 1.5 MG/1
4.5 CAPSULE ORAL 2 TIMES DAILY
Status: DISCONTINUED | OUTPATIENT
Start: 2021-01-14 | End: 2021-01-15 | Stop reason: HOSPADM

## 2021-01-14 RX ORDER — NITROFURANTOIN 25; 75 MG/1; MG/1
100 CAPSULE ORAL 2 TIMES DAILY WITH MEALS
Status: DISCONTINUED | OUTPATIENT
Start: 2021-01-14 | End: 2021-01-15 | Stop reason: HOSPADM

## 2021-01-14 RX ORDER — AMOXICILLIN 250 MG
2 CAPSULE ORAL 2 TIMES DAILY
Status: DISCONTINUED | OUTPATIENT
Start: 2021-01-14 | End: 2021-01-15 | Stop reason: HOSPADM

## 2021-01-14 RX ORDER — TAMSULOSIN HYDROCHLORIDE 0.4 MG/1
0.4 CAPSULE ORAL EVERY MORNING
COMMUNITY
End: 2021-03-29

## 2021-01-14 RX ORDER — BISACODYL 10 MG
10 SUPPOSITORY, RECTAL RECTAL
Status: DISCONTINUED | OUTPATIENT
Start: 2021-01-14 | End: 2021-01-15 | Stop reason: HOSPADM

## 2021-01-14 RX ORDER — POLYETHYLENE GLYCOL 3350 17 G/17G
1 POWDER, FOR SOLUTION ORAL
Status: DISCONTINUED | OUTPATIENT
Start: 2021-01-14 | End: 2021-01-15 | Stop reason: HOSPADM

## 2021-01-14 RX ORDER — RISPERIDONE 1 MG/1
1 TABLET ORAL 2 TIMES DAILY
Status: DISCONTINUED | OUTPATIENT
Start: 2021-01-14 | End: 2021-01-15 | Stop reason: HOSPADM

## 2021-01-14 RX ORDER — ACETAMINOPHEN 325 MG/1
650 TABLET ORAL EVERY 6 HOURS PRN
Status: DISCONTINUED | OUTPATIENT
Start: 2021-01-14 | End: 2021-01-15 | Stop reason: HOSPADM

## 2021-01-14 RX ADMIN — RISPERIDONE 1 MG: 1 TABLET ORAL at 22:01

## 2021-01-14 RX ADMIN — CEFTRIAXONE SODIUM 1 G: 1 INJECTION, POWDER, FOR SOLUTION INTRAMUSCULAR; INTRAVENOUS at 16:14

## 2021-01-14 RX ADMIN — RIVASTIGMINE TARTRATE 4.5 MG: 1.5 CAPSULE ORAL at 22:01

## 2021-01-14 RX ADMIN — NITROFURANTOIN MONOHYDRATE/MACROCRYSTALLINE 100 MG: 25; 75 CAPSULE ORAL at 22:02

## 2021-01-14 RX ADMIN — DOCUSATE SODIUM 50 MG AND SENNOSIDES 8.6 MG 2 TABLET: 8.6; 5 TABLET, FILM COATED ORAL at 22:02

## 2021-01-14 ASSESSMENT — FIBROSIS 4 INDEX
FIB4 SCORE: 3.16
FIB4 SCORE: 2.81

## 2021-01-14 NOTE — ED PROVIDER NOTES
ED Provider Note    CHIEF COMPLAINT  Chief Complaint   Patient presents with   • Altered Mental Status     Per family, recently diagnosed with dementia.        HPI  Shirley Coffey is a 75 y.o. female who presents to the emergency department with general deterioration per . Past medical history is documented below.  provides all history given patient's baseline history of dementia. He notes that over the last six months she has been having increasing shuffling gait as well as increased deep posture. Due to this being so profound today ultimately this is why he brings her in. No known recent falls no other significant changes from baseline. He also she is able to complete most of her daily living tasks including bathing and feeding. No significant change in her overall PO intake. He has not brought this up with her primary care physician recently. The diagnosis of dementia was made roughly 4 years ago.    REVIEW OF SYSTEMS  See HPI for further details. All other systems are negative.     PAST MEDICAL HISTORY   has a past medical history of Arthritis, Atherosclerosis of both carotid arteries, Dementia (HCC), Macular pucker, left eye, Memory loss, Mixed hyperlipidemia with apolipoprotein E4 variant, and Vitamin D deficiency disease.    SOCIAL HISTORY  Social History     Tobacco Use   • Smoking status: Never Smoker   • Smokeless tobacco: Never Used   Substance and Sexual Activity   • Alcohol use: No     Alcohol/week: 0.0 oz   • Drug use: No   • Sexual activity: Yes     Partners: Male       SURGICAL HISTORY   has a past surgical history that includes tonsillectomy; appendectomy; and hysterectomy, vaginal.    CURRENT MEDICATIONS  Home Medications     Reviewed by Seng Davis (Pharmacy Tech) on 01/14/21 at 1540  Med List Status: Complete   Medication Last Dose Status   aspirin EC (ECOTRIN) 81 MG Tablet Delayed Response 1/14/2021 Active   Cholecalciferol (VITAMIN D) 1000 UNIT CAPS 1/14/2021 Active  "  memantine (NAMENDA) 5 MG Tab 2021 Active   risperiDONE (RISPERDAL) 1 MG Tab 2021 Active   rivastigmine (EXELON) 4.5 MG capsule 2021 Active   rosuvastatin (CRESTOR) 20 MG Tab 2021 Active   tamsulosin (FLOMAX) 0.4 MG capsule 2021 Active   therapeutic multivitamin-minerals (THERAGRAN-M) Tab 2021 Active                ALLERGIES  No Known Allergies    PHYSICAL EXAM  VITAL SIGNS: /60   Pulse 60   Temp 36.3 °C (97.3 °F) (Temporal)   Resp (!) 29   Ht 1.626 m (5' 4\")   Wt 44.5 kg (98 lb)   SpO2 95%   BMI 16.82 kg/m²  @ESTEFANIA[[id number]::@   Pulse ox interpretation: I interpret this pulse ox as normal.  Constitutional: Alert in no apparent distress.  HENT: No signs of trauma, Bilateral external ears normal, Nose normal. Slightly dry mucous membranes  Eyes: Pupils are equal and reactive  Neck: Normal range of motion, No tenderness, Supple  Cardiovascular: Regular rate and rhythm, no murmurs.   Thorax & Lungs: Normal breath sounds, No respiratory distress, No wheezing, No chest tenderness.   Abdomen: Bowel sounds normal, Soft, No tenderness  Skin: Warm, Dry, No erythema, No rash.   Back: No bony tenderness  Extremities: Intact distal pulses, No edema, No tenderness  Musculoskeletal: Good range of motion in all major joints. No tenderness to palpation or major deformities noted.   Neurologic: awake, alert. Oriented to person in place only. Pleasantly demented.      DIAGNOSTIC STUDIES / PROCEDURES    EKG  Results for orders placed or performed during the hospital encounter of 21   EKG (NOW)   Result Value Ref Range    Report       [facility] Emergency Dept.    Test Date:  2021  Pt Name:    BRITT PARIKH                Department: ER  MRN:        [id number]                      Room:        22  Gender:     Female                       Technician: [id number]  :        1945                   Requested By:MALIA DOAN  Order #:    [id number]                    " Reading MD:    Measurements  Intervals                                Axis  Rate:       67                           P:          80  KY:         152                          QRS:        51  QRSD:       70                           T:          71  QT:         384  QTc:        406    Interpretive Statements  SINUS RHYTHM  CONSIDER LEFT ATRIAL ABNORMALITY  Compared to ECG 07/12/2020 10:57:12  Sinus bradycardia no longer present  ST (T wave) deviation no longer present     .    LABS  Results for orders placed or performed during the hospital encounter of 01/14/21   CBC WITH DIFFERENTIAL   Result Value Ref Range    WBC 5.9 4.8 - 10.8 K/uL    RBC 4.36 4.20 - 5.40 M/uL    Hemoglobin 14.3 12.0 - 16.0 g/dL    Hematocrit 42.6 37.0 - 47.0 %    MCV 97.7 81.4 - 97.8 fL    MCH 32.8 27.0 - 33.0 pg    MCHC 33.6 33.6 - 35.0 g/dL    RDW 46.2 35.9 - 50.0 fL    Platelet Count 103 (L) 164 - 446 K/uL    MPV 8.5 (L) 9.0 - 12.9 fL    Neutrophils-Polys 79.30 (H) 44.00 - 72.00 %    Lymphocytes 11.80 (L) 22.00 - 41.00 %    Monocytes 7.40 0.00 - 13.40 %    Eosinophils 0.50 0.00 - 6.90 %    Basophils 0.50 0.00 - 1.80 %    Immature Granulocytes 0.50 0.00 - 0.90 %    Nucleated RBC 0.00 /100 WBC    Neutrophils (Absolute) 4.70 2.00 - 7.15 K/uL    Lymphs (Absolute) 0.70 (L) 1.00 - 4.80 K/uL    Monos (Absolute) 0.44 0.00 - 0.85 K/uL    Eos (Absolute) 0.03 0.00 - 0.51 K/uL    Baso (Absolute) 0.03 0.00 - 0.12 K/uL    Immature Granulocytes (abs) 0.03 0.00 - 0.11 K/uL    NRBC (Absolute) 0.00 K/uL   COMP METABOLIC PANEL   Result Value Ref Range    Sodium 142 135 - 145 mmol/L    Potassium 3.9 3.6 - 5.5 mmol/L    Chloride 104 96 - 112 mmol/L    Co2 27 20 - 33 mmol/L    Anion Gap 11.0 7.0 - 16.0    Glucose 98 65 - 99 mg/dL    Bun 21 8 - 22 mg/dL    Creatinine 0.78 0.50 - 1.40 mg/dL    Calcium 10.8 (H) 8.5 - 10.5 mg/dL    AST(SGOT) 36 12 - 45 U/L    ALT(SGPT) 65 (H) 2 - 50 U/L    Alkaline Phosphatase 114 (H) 30 - 99 U/L    Total Bilirubin 0.8 0.1 - 1.5 mg/dL     Albumin 4.8 3.2 - 4.9 g/dL    Total Protein 7.3 6.0 - 8.2 g/dL    Globulin 2.5 1.9 - 3.5 g/dL    A-G Ratio 1.9 g/dL   URINALYSIS CULTURE, IF INDICATED    Specimen: Urine, Clean Catch   Result Value Ref Range    Color Yellow     Character Cloudy (A)     Specific Gravity 1.020 <1.035    Ph 7.0 5.0 - 8.0    Glucose Negative Negative mg/dL    Ketones Negative Negative mg/dL    Protein Negative Negative mg/dL    Bilirubin Negative Negative    Urobilinogen, Urine 0.2 Negative    Nitrite Positive (A) Negative    Leukocyte Esterase Small (A) Negative    Occult Blood Trace (A) Negative    Micro Urine Req Microscopic    ESTIMATED GFR   Result Value Ref Range    GFR If African American >60 >60 mL/min/1.73 m 2    GFR If Non African American >60 >60 mL/min/1.73 m 2   URINE MICROSCOPIC (W/UA)   Result Value Ref Range    WBC 20-50 (A) /hpf    RBC 2-5 (A) /hpf    Bacteria Many (A) None /hpf    Epithelial Cells Negative /hpf    Hyaline Cast 3-5 (A) /lpf   SARS-CoV-2 PCR (24 hour In-House): Collect NP swab in VTM    Specimen: Nasopharyngeal; Respirate   Result Value Ref Range    SARS-CoV-2 Source NP Swab    EKG (NOW)   Result Value Ref Range    Report       [facility] Emergency Dept.    Test Date:  2021  Pt Name:    BRITT PARIKH                Department: ER  MRN:        [id number]                      Room:       [facility]  Gender:     Female                       Technician: [id number]  :        1945                   Requested By:MALIA DOAN  Order #:    [id number]                    Reading MD:    Measurements  Intervals                                Axis  Rate:       67                           P:          80  OR:         152                          QRS:        51  QRSD:       70                           T:          71  QT:         384  QTc:        406    Interpretive Statements  SINUS RHYTHM  CONSIDER LEFT ATRIAL ABNORMALITY  Compared to ECG 2020 10:57:12  Sinus bradycardia no longer  present  ST (T wave) deviation no longer present         RADIOLOGY  CT-HEAD W/O   Final Result      1.  Generalized atrophy and chronic microvascular ischemic type changes.   2.  No acute intracranial abnormality.      DX-CHEST-PORTABLE (1 VIEW)   Final Result      Minimal bibasilar atelectasis.      MR-BRAIN-WITH & W/O    (Results Pending)           COURSE & MEDICAL DECISION MAKING  Pertinent Labs & Imaging studies reviewed. (See chart for details)  patient presenting to the ER with the above complaint per . Workup is largely benign other than incidental finding of urinary tract infection which I have initiated treatment for. Again at this point I believe that the 's recall on the events that led him to bring her here today are consistent with likely progression of Parkinsonian disease process. At this point he has been has not had any follow-up to this regard with the PCP. I will have her brought into the hospital service for ongoing inpatient care and workup for this reason. At this point I do not believe that the patient can take care of itself in the  also to seems to be having difficulty with this task.    FINAL IMPRESSION  UTI    Suspect progression of Parkinson's    History of dementia      Electronically signed by: Guy Burnette M.D., 1/14/2021 2:38 PM

## 2021-01-14 NOTE — ED TRIAGE NOTES
".  Chief Complaint   Patient presents with   • Altered Mental Status     Per family, recently diagnosed with dementia.    Initial diagnosis approximately 2 years ago, worsening symptoms over the last 6 months.  Family reports \" she does not speak well, shuffles when she walks very slowly. \"  Pt alert and oriented X 2(date/event?).  Pt soft spoken, has difficulty answering questions.      Pt educated on the triage process.  Pt was instructed to notify staff for any worsening symptoms.  Pt denies any recent travel, denies exposure to COVID positive individuals.  Pt also denies any respiratory or flu like symptoms at this time.  Mask in place.   "

## 2021-01-14 NOTE — ED NOTES
Pt ambulated up to bathroom with steady gait with tech. Pt provided with urine sample. Back to bed, aware of POC.

## 2021-01-14 NOTE — ED NOTES
Med rec complete per interview with pt  (med list at bedside). Pt  states that pt is on tamsulosin 0.4 mg qday.  Allergies reviewed.  denies that pt has been on any abx in the past 14 days.

## 2021-01-15 VITALS
OXYGEN SATURATION: 90 % | WEIGHT: 102.29 LBS | HEART RATE: 54 BPM | BODY MASS INDEX: 17.46 KG/M2 | RESPIRATION RATE: 17 BRPM | HEIGHT: 64 IN | SYSTOLIC BLOOD PRESSURE: 98 MMHG | TEMPERATURE: 98.7 F | DIASTOLIC BLOOD PRESSURE: 61 MMHG

## 2021-01-15 DIAGNOSIS — Z23 NEED FOR VACCINATION: ICD-10-CM

## 2021-01-15 LAB
ALBUMIN SERPL BCP-MCNC: 3.9 G/DL (ref 3.2–4.9)
ALBUMIN/GLOB SERPL: 1.5 G/DL
ALP SERPL-CCNC: 97 U/L (ref 30–99)
ALT SERPL-CCNC: 48 U/L (ref 2–50)
ANION GAP SERPL CALC-SCNC: 12 MMOL/L (ref 7–16)
AST SERPL-CCNC: 34 U/L (ref 12–45)
BASOPHILS # BLD AUTO: 0.5 % (ref 0–1.8)
BASOPHILS # BLD: 0.03 K/UL (ref 0–0.12)
BILIRUB SERPL-MCNC: 0.7 MG/DL (ref 0.1–1.5)
BUN SERPL-MCNC: 19 MG/DL (ref 8–22)
CALCIUM SERPL-MCNC: 10 MG/DL (ref 8.5–10.5)
CHLORIDE SERPL-SCNC: 105 MMOL/L (ref 96–112)
CO2 SERPL-SCNC: 22 MMOL/L (ref 20–33)
CREAT SERPL-MCNC: 0.64 MG/DL (ref 0.5–1.4)
EOSINOPHIL # BLD AUTO: 0.12 K/UL (ref 0–0.51)
EOSINOPHIL NFR BLD: 2.2 % (ref 0–6.9)
ERYTHROCYTE [DISTWIDTH] IN BLOOD BY AUTOMATED COUNT: 46.3 FL (ref 35.9–50)
GLOBULIN SER CALC-MCNC: 2.6 G/DL (ref 1.9–3.5)
GLUCOSE SERPL-MCNC: 89 MG/DL (ref 65–99)
HCT VFR BLD AUTO: 40.5 % (ref 37–47)
HGB BLD-MCNC: 13.3 G/DL (ref 12–16)
IMM GRANULOCYTES # BLD AUTO: 0.03 K/UL (ref 0–0.11)
IMM GRANULOCYTES NFR BLD AUTO: 0.5 % (ref 0–0.9)
LYMPHOCYTES # BLD AUTO: 1.04 K/UL (ref 1–4.8)
LYMPHOCYTES NFR BLD: 19 % (ref 22–41)
MCH RBC QN AUTO: 32.3 PG (ref 27–33)
MCHC RBC AUTO-ENTMCNC: 32.8 G/DL (ref 33.6–35)
MCV RBC AUTO: 98.3 FL (ref 81.4–97.8)
MONOCYTES # BLD AUTO: 0.42 K/UL (ref 0–0.85)
MONOCYTES NFR BLD AUTO: 7.7 % (ref 0–13.4)
NEUTROPHILS # BLD AUTO: 3.83 K/UL (ref 2–7.15)
NEUTROPHILS NFR BLD: 70.1 % (ref 44–72)
NRBC # BLD AUTO: 0 K/UL
NRBC BLD-RTO: 0 /100 WBC
PLATELET # BLD AUTO: 112 K/UL (ref 164–446)
PMV BLD AUTO: 8.4 FL (ref 9–12.9)
POTASSIUM SERPL-SCNC: 3.6 MMOL/L (ref 3.6–5.5)
PROT SERPL-MCNC: 6.5 G/DL (ref 6–8.2)
RBC # BLD AUTO: 4.12 M/UL (ref 4.2–5.4)
SARS-COV-2 RNA RESP QL NAA+PROBE: NOTDETECTED
SODIUM SERPL-SCNC: 139 MMOL/L (ref 135–145)
SPECIMEN SOURCE: NORMAL
WBC # BLD AUTO: 5.5 K/UL (ref 4.8–10.8)

## 2021-01-15 PROCEDURE — 700102 HCHG RX REV CODE 250 W/ 637 OVERRIDE(OP): Performed by: STUDENT IN AN ORGANIZED HEALTH CARE EDUCATION/TRAINING PROGRAM

## 2021-01-15 PROCEDURE — G0378 HOSPITAL OBSERVATION PER HR: HCPCS

## 2021-01-15 PROCEDURE — 700111 HCHG RX REV CODE 636 W/ 250 OVERRIDE (IP): Performed by: STUDENT IN AN ORGANIZED HEALTH CARE EDUCATION/TRAINING PROGRAM

## 2021-01-15 PROCEDURE — A9270 NON-COVERED ITEM OR SERVICE: HCPCS | Performed by: STUDENT IN AN ORGANIZED HEALTH CARE EDUCATION/TRAINING PROGRAM

## 2021-01-15 PROCEDURE — 36415 COLL VENOUS BLD VENIPUNCTURE: CPT

## 2021-01-15 PROCEDURE — 96372 THER/PROPH/DIAG INJ SC/IM: CPT

## 2021-01-15 PROCEDURE — 97166 OT EVAL MOD COMPLEX 45 MIN: CPT

## 2021-01-15 PROCEDURE — 85025 COMPLETE CBC W/AUTO DIFF WBC: CPT

## 2021-01-15 PROCEDURE — 99217 PR OBSERVATION CARE DISCHARGE: CPT | Performed by: INTERNAL MEDICINE

## 2021-01-15 PROCEDURE — 80053 COMPREHEN METABOLIC PANEL: CPT

## 2021-01-15 PROCEDURE — 97162 PT EVAL MOD COMPLEX 30 MIN: CPT

## 2021-01-15 RX ORDER — NITROFURANTOIN 25; 75 MG/1; MG/1
100 CAPSULE ORAL 2 TIMES DAILY WITH MEALS
Qty: 8 CAP | Refills: 0 | Status: SHIPPED | OUTPATIENT
Start: 2021-01-15 | End: 2021-01-19

## 2021-01-15 RX ADMIN — DOCUSATE SODIUM 50 MG AND SENNOSIDES 8.6 MG 2 TABLET: 8.6; 5 TABLET, FILM COATED ORAL at 05:28

## 2021-01-15 RX ADMIN — NITROFURANTOIN MONOHYDRATE/MACROCRYSTALLINE 100 MG: 25; 75 CAPSULE ORAL at 08:05

## 2021-01-15 RX ADMIN — ROSUVASTATIN CALCIUM 20 MG: 20 TABLET, FILM COATED ORAL at 05:28

## 2021-01-15 RX ADMIN — RISPERIDONE 1 MG: 1 TABLET ORAL at 05:28

## 2021-01-15 RX ADMIN — TAMSULOSIN HYDROCHLORIDE 0.4 MG: 0.4 CAPSULE ORAL at 05:28

## 2021-01-15 RX ADMIN — RIVASTIGMINE TARTRATE 4.5 MG: 1.5 CAPSULE ORAL at 05:28

## 2021-01-15 RX ADMIN — ASPIRIN 81 MG: 81 TABLET, COATED ORAL at 05:28

## 2021-01-15 RX ADMIN — ENOXAPARIN SODIUM 40 MG: 40 INJECTION SUBCUTANEOUS at 05:28

## 2021-01-15 ASSESSMENT — COGNITIVE AND FUNCTIONAL STATUS - GENERAL
DAILY ACTIVITIY SCORE: 16
SUGGESTED CMS G CODE MODIFIER DAILY ACTIVITY: CK
HELP NEEDED FOR BATHING: A LOT
MOVING FROM LYING ON BACK TO SITTING ON SIDE OF FLAT BED: UNABLE
PERSONAL GROOMING: A LITTLE
TOILETING: A LOT
SUGGESTED CMS G CODE MODIFIER MOBILITY: CL
STANDING UP FROM CHAIR USING ARMS: A LOT
DRESSING REGULAR LOWER BODY CLOTHING: A LOT
HELP NEEDED FOR BATHING: A LOT
MOBILITY SCORE: 13
TOILETING: A LOT
CLIMB 3 TO 5 STEPS WITH RAILING: A LOT
SUGGESTED CMS G CODE MODIFIER DAILY ACTIVITY: CK
WALKING IN HOSPITAL ROOM: A LOT
MOVING TO AND FROM BED TO CHAIR: A LOT
TURNING FROM BACK TO SIDE WHILE IN FLAT BAD: A LITTLE
MOBILITY SCORE: 14
MOVING TO AND FROM BED TO CHAIR: UNABLE
PERSONAL GROOMING: A LOT
DRESSING REGULAR UPPER BODY CLOTHING: A LOT
DAILY ACTIVITIY SCORE: 14
SUGGESTED CMS G CODE MODIFIER MOBILITY: CL
DRESSING REGULAR LOWER BODY CLOTHING: A LOT
MOVING FROM LYING ON BACK TO SITTING ON SIDE OF FLAT BED: A LITTLE
TURNING FROM BACK TO SIDE WHILE IN FLAT BAD: A LITTLE
CLIMB 3 TO 5 STEPS WITH RAILING: A LOT
WALKING IN HOSPITAL ROOM: A LITTLE
STANDING UP FROM CHAIR USING ARMS: A LITTLE
DRESSING REGULAR UPPER BODY CLOTHING: A LITTLE

## 2021-01-15 ASSESSMENT — GAIT ASSESSMENTS
GAIT LEVEL OF ASSIST: MINIMAL ASSIST
DEVIATION: DECREASED BASE OF SUPPORT;BRADYKINETIC;SHUFFLED GAIT
ASSISTIVE DEVICE: HAND HELD ASSIST
DISTANCE (FEET): 35

## 2021-01-15 ASSESSMENT — ACTIVITIES OF DAILY LIVING (ADL): TOILETING: UNABLE TO DETERMINE AT THIS TIME

## 2021-01-15 ASSESSMENT — FIBROSIS 4 INDEX: FIB4 SCORE: 3.2

## 2021-01-15 NOTE — CARE PLAN
Problem: Venous Thromboembolism (VTW)/Deep Vein Thrombosis (DVT) Prevention:  Goal: Patient will participate in Venous Thrombosis (VTE)/Deep Vein Thrombosis (DVT)Prevention Measures  Outcome: PROGRESSING AS EXPECTED  Intervention: Assess and monitor for anticoagulation complications  Note: Pt to receive Lovenox for VTE/DVT prevention.     Problem: Psychosocial Needs:  Goal: Level of anxiety will decrease  Outcome: PROGRESSING AS EXPECTED  Intervention: Identify and develop with patient strategies to cope with anxiety triggers  Note: Pt A&Ox1, requires frequent reorientation. Pt anxious about  not knowing where she was, pt reassured that  was aware of where she is, CNA assisted pt to call , which helped ease anxiety.

## 2021-01-15 NOTE — DISCHARGE SUMMARY
"Discharge Summary    CHIEF COMPLAINT ON ADMISSION  Chief Complaint   Patient presents with   • Altered Mental Status     Per family, recently diagnosed with dementia.        Reason for Admission  ALOC     Admission Date  1/14/2021    CODE STATUS  Full Code    HPI & HOSPITAL COURSE  Per H&P, \"75 YOF with PMHx of dementia, HLD, arthritis, who presented 1/14/2021 with difficulty with ambulation and gait. Pt is a poor historian due to her dementia so most of her history was obtained from her  (Denny Coffey, [phone]). Per , she has been exhibiting a shuffling gait with \"stooped over\" posture that has been getting worse over the past 6 months, but he felt that it was the worst today. They had not seen their PCP regarding this but decided to come in today as pt was unable to straighten her back while walking. \"     In the ED, her CBC and CMP were normal. CT of head was normal but did show chronic ischemic changes and atrophy. CXR showed minimal bibasilar atelectasis. EKG showed NSR. UA was positive for UTI - she was given 1 dose of Rocephin in the ED and switched to Macrobid when she was admitted.  She will complete oral antibiotics for urinary tract infection on 1/19.  While in the hospital patient's history and diagnosis of dementia and parkinsonism was discussed with patient's  in detail.  The importance of following up with primary care physician, discussing goals of care, advanced directives and planning for the future was discussed in detail with the patient and her .  The patient does participate in physical therapy and Occupational Therapy,.  Home health was offered to her , he does not feel it is necessary at this time, will readdress the issue with his primary care physician.  Patient had resolution of all symptoms, was back to her baseline on the day of discharge.      Therefore, she is discharged in good and stable condition to home with organized home healthcare and close " outpatient follow-up.    The patient recovered much more quickly than anticipated on admission.    Discharge Date  1/15/2021    FOLLOW UP ITEMS POST DISCHARGE  Follow-up with PCP    DISCHARGE DIAGNOSES  Principal Problem:    Dementia (HCC) (Chronic) POA: Yes  Active Problems:    HLD (hyperlipidemia) POA: Unknown    UTI (urinary tract infection) POA: Unknown  Resolved Problems:    * No resolved hospital problems. *      FOLLOW UP  Future Appointments   Date Time Provider Department Center   3/29/2021 11:00 AM BRIAN Baez None     No follow-up provider specified.    MEDICATIONS ON DISCHARGE     Medication List      ASK your doctor about these medications      Instructions   aspirin EC 81 MG Tbec  Commonly known as: ECOTRIN   Take 81 mg by mouth every day.  Dose: 81 mg     memantine 5 MG Tabs  Commonly known as: Namenda   Take 1 Tab by mouth 2 times a day.  Dose: 5 mg     risperiDONE 1 MG Tabs  Commonly known as: RISPERDAL   TAKE ONE (1) TABLET BY MOUTH TWICE DAILY     rivastigmine 4.5 MG capsule  Commonly known as: EXELON   Take 4.5 mg by mouth 2 times a day.  Dose: 4.5 mg     rosuvastatin 20 MG Tabs  Commonly known as: CRESTOR   TAKE 1 TABLET BY MOUTH EVERY DAY IN THE EVENING     tamsulosin 0.4 MG capsule  Commonly known as: FLOMAX   Take 0.4 mg by mouth every morning.  Dose: 0.4 mg     therapeutic multivitamin-minerals Tabs   Take 1 Tab by mouth every day.  Dose: 1 Tab     Vitamin D 1000 UNIT Caps   Take 1 Cap by mouth every day.  Dose: 1 Each            Allergies  No Known Allergies    DIET  Orders Placed This Encounter   Procedures   • Diet Order Diet: Level 7 - Easy to Chew; Liquid level: Level 0 - Thin     Standing Status:   Standing     Number of Occurrences:   1     Order Specific Question:   Diet:     Answer:   Level 7 - Easy to Chew [22]     Order Specific Question:   Liquid level     Answer:   Level 0 - Thin       ACTIVITY  As tolerated.  Weight bearing as  tolerated    CONSULTATIONS  None    PROCEDURES  None    LABORATORY  Lab Results   Component Value Date    SODIUM 139 01/15/2021    POTASSIUM 3.6 01/15/2021    CHLORIDE 105 01/15/2021    CO2 22 01/15/2021    GLUCOSE 89 01/15/2021    BUN 19 01/15/2021    CREATININE 0.64 01/15/2021        Lab Results   Component Value Date    WBC 5.5 01/15/2021    HEMOGLOBIN 13.3 01/15/2021    HEMATOCRIT 40.5 01/15/2021    PLATELETCT 112 (L) 01/15/2021        Total time of the discharge process exceeds 31 minutes.

## 2021-01-15 NOTE — ASSESSMENT & PLAN NOTE
- UA positive for nitrites and leukocyte esterase - given 1 dose of Rocephin in ED  - Unclear if pt has symptoms from UTI 2/2 dementia but will continue Abx for now

## 2021-01-15 NOTE — DISCHARGE PLANNING
"Care Transition Team Assessment    Per Md's Notes:  Miss Coffey is a 75 YOF with PMHx of dementia, HLD, arthritis, who presented 1/14/2021 with difficulty with ambulation and gait. Pt is a poor historian due to her dementia so most of her history was obtained from her  (Denny Coffey, [phone]). Per , she has been exhibiting a shuffling gait with \"stooped over\" posture that has been getting worse over the past 6 months, but he felt that it was the worst today. They had not seen their PCP regarding this but decided to come in today as pt was unable to straighten her back while walking.     Anticipated Discharge Disposition: Home with her spouse who is her caregiver, 24/7. Patient has PMHx of dementia.    Action: Patient was discussed at IDT rounds, patient should be ready for discharge tomorrow on 1/16/2021. No needs anticipated prior to discharge. Spouse, Denny is her caregiver. Patient uses a fww at home.     Barriers to Discharge: Not medically cleared, scheduled for an MRI today    Plan:  available for any discharge needs, none anticipated on discharge.    Information Source  Orientation : Disoriented to Event, Disoriented to Place, Disoriented to Time  Information Given By: Spouse  Informant's Name: (Denny Coffey)  Who is responsible for making decisions for patient? : Spouse  Name(s) of Primary Decision Maker: (Denny Coffey)    Readmission Evaluation  Is this a readmission?: No    Elopement Risk  Legal Hold: No  Ambulatory or Self Mobile in Wheelchair: Yes  Disoriented: Place-At Risk for Elopement, Time-At Risk for Elopement, Situation-At Risk for Elopement  Elopement Risk: At Risk for Elopement  Wanderguard On: No (See Comments)  Picture of Patient on Inside Chart Front Cover: Yes    Interdisciplinary Discharge Planning  Lives with - Patient's Self Care Capacity: Spouse  Patient or legal guardian wants to designate a caregiver: Yes  Caregiver name: Denny Coffey  Caregiver contact info: Phone " number in chart (emergrncy contact)  ([place]) Authorization for Release of Health Information has been completed: (release of health info form placed in chart. patient confused to sign)  Housing / Facility: Unable To Determine At This Time  Prior Services: Continuous (24 Hour) Care Giving Family    Discharge Preparedness  What is your plan after discharge?: Home with help  What are your discharge supports?: Spouse  Prior Functional Level: Ambulatory, Needs Assist with Activities of Daily Living, Needs Assist with Medication Management, Uses Walker  Difficulity with ADLs: Bathing, Brushing teeth, Dressing, Toileting, Walking  Difficulty with ADLs Comment: (spouse is 24/7 caregiver)  Difficulity with IADLs: Cooking, Driving, Keeping track of finances, Laundry, Managing medication, Shopping, Using the telephone or computer  Difficulity with IADL Comments: (has 24/7 caregiver)    Functional Assesment  Prior Functional Level: Ambulatory, Needs Assist with Activities of Daily Living, Needs Assist with Medication Management, Uses Walker    Finances  Financial Barriers to Discharge: No  Prescription Coverage: Yes    Vision / Hearing Impairment  Right Eye Vision: Impaired, Wears Glasses  Left Eye Vision: Impaired, Wears Glasses         Advance Directive  Advance Directive?: None  Advance Directive offered?: AD Booklet refused              Discharge Risks or Barriers  Discharge risks or barriers?: No  Patient risk factors: Vulnerable adult    Anticipated Discharge Information  Discharge Disposition: Discharged to home/self care (01)  Discharge Address: [facility] [place] [address]  Discharge Contact Phone Number: [phone]

## 2021-01-15 NOTE — ED NOTES
"Pt up and waving nurse down. Pt stated, \"I got scared\". Pt assisted to bathroom and back to bed. Pt reassured and blankets provided as well as calming channel turned on the tv. Pt within view of nurses station. VSS.   Awaiting admit.   "

## 2021-01-15 NOTE — WOUND TEAM
Wound consult received for left hand skin tear. Photos reviewed and RN wound treatment protocol for skin tear management ordered. Nursing to place dressing as ordered.

## 2021-01-15 NOTE — PROGRESS NOTES
Pt A&Ox1, requires reorientation. Arrived to unit at 2115 via gurney. Pt oriented to room and plan of care. Assessment completed on RA, VSS. All questions answered at this time. Call light within reach, pt educated to call for assistance; fall precautions in place - bed locked in lowest position, 3 rails raised, treaded socks on, bed alarm on. Hourly rounding in place.

## 2021-01-15 NOTE — H&P
"History & Physical Note    Date of Admission: 1/14/2021  Admission Status: Observation-Outpatient  Attending: Gil Darling M.D.   Intern: Dr. Lancaster  Contact Number: [phone]    Chief Complaint: Difficulty with walking     History of Present Illness (HPI):   Miss Coffey is a 75 YOF with PMHx of dementia, HLD, arthritis, who presented 1/14/2021 with difficulty with ambulation and gait. Pt is a poor historian due to her dementia so most of her history was obtained from her  (Denny Coffey, [phone]). Per , she has been exhibiting a shuffling gait with \"stooped over\" posture that has been getting worse over the past 6 months, but he felt that it was the worst today. They had not seen their PCP regarding this but decided to come in today as pt was unable to straighten her back while walking.     In the ED, her CBC and CMP were normal. CT of head was normal but did show chronic ischemic changes and atrophy. CXR showed minimal bibasilar atelectasis. EKG showed NSR. UA was positive for UTI - she was given 1 dose of Rocephin in the ED.     Review of Systems:   Unable to fully obtain due to pt's history of dementia.  Pt reports falls, arthralgia, bowel and bladder incontinence but per , she has not had any recent falls or have bowel/bladder incontinence.       Past Medical History:   Past Medical History was reviewed with patient.   has a past medical history of Arthritis, Atherosclerosis of both carotid arteries, Dementia (HCC), Macular pucker, left eye, Memory loss, Mixed hyperlipidemia with apolipoprotein E4 variant, and Vitamin D deficiency disease.    Past Surgical History: Past Surgical History was reviewed with patient.   has a past surgical history that includes tonsillectomy; appendectomy; and hysterectomy, vaginal.    Medications: Medications have been reviewed with patient.  Prior to Admission Medications   Prescriptions Last Dose Informant Patient Reported? Taking?   Cholecalciferol " (VITAMIN D) 1000 UNIT CAPS 1/14/2021 at AM Significant Other Yes No   Sig: Take 1 Cap by mouth every day.   aspirin EC (ECOTRIN) 81 MG Tablet Delayed Response 1/14/2021 at AM Significant Other Yes Yes   Sig: Take 81 mg by mouth every day.   memantine (NAMENDA) 5 MG Tab 1/14/2021 at Unknown time Significant Other No No   Sig: Take 1 Tab by mouth 2 times a day.   Patient not taking: Reported on 1/14/2021   risperiDONE (RISPERDAL) 1 MG Tab 1/14/2021 at AM Significant Other No No   Sig: TAKE ONE (1) TABLET BY MOUTH TWICE DAILY   rivastigmine (EXELON) 4.5 MG capsule 1/14/2021 at AM Significant Other Yes No   Sig: Take 4.5 mg by mouth 2 times a day.   rosuvastatin (CRESTOR) 20 MG Tab 1/14/2021 at AM Significant Other No No   Sig: TAKE 1 TABLET BY MOUTH EVERY DAY IN THE EVENING   Patient taking differently: Take 20 mg by mouth every morning.   tamsulosin (FLOMAX) 0.4 MG capsule 1/14/2021 at AM Significant Other Yes Yes   Sig: Take 0.4 mg by mouth every morning.   therapeutic multivitamin-minerals (THERAGRAN-M) Tab 1/14/2021 at AM Significant Other No No   Sig: Take 1 Tab by mouth every day.      Facility-Administered Medications: None        Allergies: Allergies have been reviewed with patient.  No Known Allergies    Family History:   family history includes Cancer (age of onset: 75) in her sister; Dementia in her father.     Social History:   Tobacco: Never used   Alcohol: Denies   Recreational drugs (illegal and prescription):  Denies   Employment: Retired  Activity Level: Minimal   Living situation:  Lives in one-story house with , who is primary caretaker  Primary Care Provider: reviewed Adria Thomas M.D.  Physical Exam:     Vitals:  Temp:  [36.3 °C (97.3 °F)] 36.3 °C (97.3 °F)  Pulse:  [59-84] 84  Resp:  [16-29] 29  BP: (104-142)/(56-89) 126/59  SpO2:  [92 %-95 %] 92 %    Physical Exam  Constitutional:       General: She is in acute distress.      Appearance: Normal appearance.   HENT:      Head:  Normocephalic and atraumatic.      Mouth/Throat:      Mouth: Mucous membranes are moist.   Eyes:      Extraocular Movements: Extraocular movements intact.      Pupils: Pupils are equal, round, and reactive to light.      Comments: Difficulty with vision peripherally   Cardiovascular:      Rate and Rhythm: Normal rate and regular rhythm.      Heart sounds: Normal heart sounds. No murmur. No friction rub. No gallop.    Pulmonary:      Effort: Pulmonary effort is normal. No respiratory distress.      Breath sounds: Normal breath sounds. No wheezing.   Abdominal:      General: Bowel sounds are normal. There is no distension.      Palpations: Abdomen is soft.      Tenderness: There is no abdominal tenderness.   Musculoskeletal: Normal range of motion.      Right lower leg: No edema.      Left lower leg: No edema.   Skin:     General: Skin is warm.      Findings: No lesion or rash.   Neurological:      Mental Status: She is alert. Mental status is at baseline. She is disoriented.      Cranial Nerves: No cranial nerve deficit.      Sensory: No sensory deficit.      Motor: Weakness (4/5 strength BUE, 3/5 strength BLE) present.      Coordination: Coordination abnormal (Unable to do FNF, unclear if 2/2 vision or cognitive decline).      Gait: Gait abnormal (shuffling).      Comments: A&O x 1-2, no visible tremor   Psychiatric:      Comments: Word-finding difficulty, slow mentation         Labs:   Recent Results (from the past 24 hour(s))   CBC WITH DIFFERENTIAL    Collection Time: 01/14/21  1:20 PM   Result Value Ref Range    WBC 5.9 4.8 - 10.8 K/uL    RBC 4.36 4.20 - 5.40 M/uL    Hemoglobin 14.3 12.0 - 16.0 g/dL    Hematocrit 42.6 37.0 - 47.0 %    MCV 97.7 81.4 - 97.8 fL    MCH 32.8 27.0 - 33.0 pg    MCHC 33.6 33.6 - 35.0 g/dL    RDW 46.2 35.9 - 50.0 fL    Platelet Count 103 (L) 164 - 446 K/uL    MPV 8.5 (L) 9.0 - 12.9 fL    Neutrophils-Polys 79.30 (H) 44.00 - 72.00 %    Lymphocytes 11.80 (L) 22.00 - 41.00 %    Monocytes  7.40 0.00 - 13.40 %    Eosinophils 0.50 0.00 - 6.90 %    Basophils 0.50 0.00 - 1.80 %    Immature Granulocytes 0.50 0.00 - 0.90 %    Nucleated RBC 0.00 /100 WBC    Neutrophils (Absolute) 4.70 2.00 - 7.15 K/uL    Lymphs (Absolute) 0.70 (L) 1.00 - 4.80 K/uL    Monos (Absolute) 0.44 0.00 - 0.85 K/uL    Eos (Absolute) 0.03 0.00 - 0.51 K/uL    Baso (Absolute) 0.03 0.00 - 0.12 K/uL    Immature Granulocytes (abs) 0.03 0.00 - 0.11 K/uL    NRBC (Absolute) 0.00 K/uL   COMP METABOLIC PANEL    Collection Time: 21  1:20 PM   Result Value Ref Range    Sodium 142 135 - 145 mmol/L    Potassium 3.9 3.6 - 5.5 mmol/L    Chloride 104 96 - 112 mmol/L    Co2 27 20 - 33 mmol/L    Anion Gap 11.0 7.0 - 16.0    Glucose 98 65 - 99 mg/dL    Bun 21 8 - 22 mg/dL    Creatinine 0.78 0.50 - 1.40 mg/dL    Calcium 10.8 (H) 8.5 - 10.5 mg/dL    AST(SGOT) 36 12 - 45 U/L    ALT(SGPT) 65 (H) 2 - 50 U/L    Alkaline Phosphatase 114 (H) 30 - 99 U/L    Total Bilirubin 0.8 0.1 - 1.5 mg/dL    Albumin 4.8 3.2 - 4.9 g/dL    Total Protein 7.3 6.0 - 8.2 g/dL    Globulin 2.5 1.9 - 3.5 g/dL    A-G Ratio 1.9 g/dL   ESTIMATED GFR    Collection Time: 21  1:20 PM   Result Value Ref Range    GFR If African American >60 >60 mL/min/1.73 m 2    GFR If Non African American >60 >60 mL/min/1.73 m 2   EKG (NOW)    Collection Time: 21  1:53 PM   Result Value Ref Range    Report       [facility] Emergency Dept.    Test Date:  2021  Pt Name:    BRITT PARIKH                Department: ER  MRN:        [id number]                      Room:        22  Gender:     Female                       Technician: [id number]  :        [id number]                   Requested By:MALIA DOAN  Order #:    [id number]                    Reading MD:    Measurements  Intervals                                Axis  Rate:       67                           P:          80  MA:         152                          QRS:        51  QRSD:       70                            T:          71  QT:         384  QTc:        406    Interpretive Statements  SINUS RHYTHM  CONSIDER LEFT ATRIAL ABNORMALITY  Compared to ECG 07/12/2020 10:57:12  Sinus bradycardia no longer present  ST (T wave) deviation no longer present     URINALYSIS CULTURE, IF INDICATED    Collection Time: 01/14/21  2:30 PM    Specimen: Urine, Clean Catch   Result Value Ref Range    Color Yellow     Character Cloudy (A)     Specific Gravity 1.020 <1.035    Ph 7.0 5.0 - 8.0    Glucose Negative Negative mg/dL    Ketones Negative Negative mg/dL    Protein Negative Negative mg/dL    Bilirubin Negative Negative    Urobilinogen, Urine 0.2 Negative    Nitrite Positive (A) Negative    Leukocyte Esterase Small (A) Negative    Occult Blood Trace (A) Negative    Micro Urine Req Microscopic    URINE MICROSCOPIC (W/UA)    Collection Time: 01/14/21  2:30 PM   Result Value Ref Range    WBC 20-50 (A) /hpf    RBC 2-5 (A) /hpf    Bacteria Many (A) None /hpf    Epithelial Cells Negative /hpf    Hyaline Cast 3-5 (A) /lpf       EKG: Per my read, NSR.      Imaging:   Independant Imaging Review: Completed  CT-HEAD W/O   Final Result      1.  Generalized atrophy and chronic microvascular ischemic type changes.   2.  No acute intracranial abnormality.      DX-CHEST-PORTABLE (1 VIEW)   Final Result      Minimal bibasilar atelectasis.      MR-BRAIN-WITH & W/O    (Results Pending)       Previous Data Review: reviewed    Problem Representation:   Pt is 75 YOF with PMHx of dementia, HLD, presenting with shuffling gait and worsening dementia, possibly concerning for Parkinson's or LBD. CT head was normal, MRI pending.     * Dementia (HCC)- (present on admission)  Assessment & Plan  - Worsening dementia with Parkinson-like Sx (shuffling gait, posturing, but no tremor) over past 6 months  - CT head showed chronic ischemic changes but no acute findings, MRI pending    - If MRI is normal, would likely benefit from Neurology consult for evaluation of  Parkinson's  - Pt also reports frequent falls, but  (primary caretaker) denies this  - Continue Rivastigmine and Risperidone    UTI (urinary tract infection)  Assessment & Plan  - UA positive for nitrites and leukocyte esterase - given 1 dose of Rocephin in ED  - Unclear if pt has symptoms from UTI 2/2 dementia but will continue Abx for now    HLD (hyperlipidemia)  Assessment & Plan  - Continue Rosuvastatin

## 2021-01-15 NOTE — THERAPY
"Occupational Therapy   Initial Evaluation     Patient Name: Shirley Coffey  Age:  75 y.o., Sex:  female  Medical Record #: [id number]  Today's Date: 1/15/2021     Precautions  Precautions: Fall Risk  Comments: dementia    Assessment  Patient is 75 y.o. female who presents to acute due to UTI and falls. PMH includes dementia w/ parkinsonian symptoms per MD. Per chart pt lives w/ spouse who is caretaker. Pt demo'd very poor initiation during functional tasks. Pt demo'd impaired cognition, functional mobility, and activity tolerance impacting functional independence.     Plan    Recommend Occupational Therapy 3 times per week until therapy goals are met for the following treatments:  Adaptive Equipment, Neuro Re-Education / Balance, Self Care/Activities of Daily Living, Therapeutic Activities and Therapeutic Exercises.    DC Equipment Recommendations: (P) Unable to determine at this time  Discharge Recommendations: (P) (Pt will require 24/7 assist and SPV at time of DC due to cog)     Subjective    \"Okay\"     Objective       01/15/21 0826   Total Time Spent   Total Time Spent (Mins) 20   Charge Group   OT Evaluation OT Evaluation Mod   Initial Contact Note    Initial Contact Note Order Received and Verified, Occupational Therapy Evaluation in Progress with Full Report to Follow.   Prior Living Situation   Prior Services Continuous (24 Hour) Care Giving Family   Housing / Facility Unable To Determine At This Time   Equipment Owned None   Lives with - Patient's Self Care Capacity Spouse   Comments Pt is a poor historian, per chart pt lives w/ spouse who is caregiver   Prior Level of ADL Function   Self Feeding Unable To Determine At This Time   Grooming / Hygiene Unable To Determine At This Time   Bathing Unable To Determine At This Time   Dressing Unable To Determine At This Time   Toileting Unable To Determine At This Time   Comments Pt poor historian    History of Falls   History of Falls Yes   Date of Last Fall "   (multiple recent falls)   Precautions   Precautions Fall Risk   Comments dementia   Vitals   O2 (LPM) 0   O2 Delivery Device None - Room Air   Pain 0 - 10 Group   Therapist Pain Assessment Post Activity Pain Same as Prior to Activity;Nurse Notified  (no c/o pain during session)   Cognition    Cognition / Consciousness X   Level of Consciousness Confused   Safety Awareness Impaired   Sequencing Impaired   Initiation Impaired   Comments very poor initiation    Active ROM Upper Body   Active ROM Upper Body  WDL   Strength Upper Body   Comments WFL   Coordination Upper Body   Comments delayed   Balance Assessment   Sitting Balance (Static) Fair   Sitting Balance (Dynamic) Fair -   Standing Balance (Static) Fair -   Standing Balance (Dynamic) Poor +   Weight Shift Sitting Fair   Weight Shift Standing Poor   Comments w/ HHA, shuffled   Bed Mobility    Supine to Sit Moderate Assist   Sit to Supine Minimal Assist   Scooting Minimal Assist   ADL Assessment   Grooming Minimal Assist;Standing  (v/c's and HHA for initiation of oral care, then proceded )   Upper Body Dressing Minimal Assist   Lower Body Dressing Maximal Assist   Toileting   (declined)   How much help from another person does the patient currently need...   Putting on and taking off regular lower body clothing? 2   Bathing (including washing, rinsing, and drying)? 2   Toileting, which includes using a toilet, bedpan, or urinal? 2   Putting on and taking off regular upper body clothing? 3   Taking care of personal grooming such as brushing teeth? 3   Eating meals? 4   6 Clicks Daily Activity Score 16   Functional Mobility   Sit to Stand Minimal Assist   Mobility within room w/ HHA   Visual Perception   Visual Perception  Not Tested   Activity Tolerance   Sitting in Chair NT   Sitting Edge of Bed 6 min   Standing 8 min   Patient / Family Goals   Patient / Family Goal #1 Did not state   Short Term Goals   Short Term Goal # 1 Pt will demo LB dressing w/ min A    Short Term Goal # 2 Pt will demo toilet txf w/ min A    Short Term Goal # 3 Pt will demo standing grooming w/ SPV    Education Group   Role of Occupational Therapist Patient Response Patient;Acceptance;Demonstration;Explanation;Verbal Demonstration   Problem List   Problem List Decreased Active Daily Living Skills;Decreased Homemaking Skills;Decreased Functional Mobility;Decreased Activity Tolerance;Impaired Postural Control / Balance;Safety Awareness Deficits / Cognition   Anticipated Discharge Equipment and Recommendations   DC Equipment Recommendations Unable to determine at this time   Discharge Recommendations   (Pt will require 24/7 assist and SPV at time of DC due to cog)   Interdisciplinary Plan of Care Collaboration   IDT Collaboration with  Nursing;Physical Therapist   Patient Position at End of Therapy Call Light within Reach;Tray Table within Reach;Phone within Reach;In Bed;Bed Alarm On   Collaboration Comments report given   Session Information   Date / Session Number  1/15, 1 (1/3, 1/21)   Priority 2

## 2021-01-15 NOTE — ASSESSMENT & PLAN NOTE
- Worsening dementia with Parkinson-like Sx (shuffling gait, posturing, but no tremor) over past 6 months  - CT head showed chronic ischemic changes but no acute findings, MRI pending    - If MRI is normal, would likely benefit from Neurology consult for evaluation of Parkinson's  - Pt also reports frequent falls, but  (primary caretaker) denies this  - Continue Rivastigmine and Risperidone

## 2021-01-15 NOTE — PROGRESS NOTES
2 RN skin check completed with Gunnar CONNER.   Devices in place: PIV.  Skin assessed under devices: yes.  Confirmed pressure ulcers found on: none, open tear on posterior left hand noted.  New potential pressure ulcers noted on: none. Wound consult placed: yes.    Skin assessment:  Bilateral ears pink, blanching  Bilateral elbows pin, blanching  Bilateral arms bruising  Left hand open tear, red, scant bleeding  Breasts and abdomen intact  Back intact  Groin clean, dry, intact  Sacrum pink, blanching  Bilateral legs flaky, dry  Heels flaky, dry, pink, blanching    The following interventions in place: pillows in use for support, wound cleansed and bandaid applied (left hand), wound consult placed, pt repositions self

## 2021-01-15 NOTE — THERAPY
Physical Therapy   Initial Evaluation     Patient Name: Shirley Coffey  Age:  75 y.o., Sex:  female  Medical Record #: [id number]  Today's Date: 1/15/2021     Precautions: Fall Risk    Assessment  Ms. Coffey is a 76 y/o female who presents to acute secondary to UTI and falls. PMH of dementia and presenting with parkinsonian symptoms per MD. Pt demonstrating absent initiation of tasks, bradykinetic movements, festinating gait pattern, and flat affect. Very pleasant and agreeable to all cues, however unless tasks are initiated physically by therapist pt simply sits without moving. Once movement or task initiated pt is able to complete it (ex leading by hands for gait, hand held for transfers). Agree that she is demonstrating parkinsonian symptoms as well. Discharge recommendations are difficult as deficits demonstrated appear to be primarily from a cognitive standpoint (lacks initiation). Once task is initiated by therapist patient does have strength and coordination to complete it. If she were to discharge home recommend 24/7 assist with caregiver to physically initiate movements. Aware this may be more assist than spouse is comfortable providing. If this is the case may benefit from a memory care. Due to primarily cognitive deficits and degenerative nature of dementia likely would not be a great post acute therapy candidate, but is a chance she could benefit from this as well. Acute PT to continue to follow.      Plan    Recommend Physical Therapy 3 times per week until therapy goals are met for the following treatments:  Bed Mobility, Gait Training, Neuro Re-Education / Balance, Therapeutic Activities and Therapeutic Exercises    DC Equipment Recommendations: Unable to determine at this time  Discharge Recommendations: Recommend post-acute placement for additional physical therapy services prior to discharge home(placement vs spouse assist vs memory care)            Objective       01/15/21 0828   Prior Living  Situation   Prior Services Continuous (24 Hour) Care Giving Family   Housing / Facility Unable To Determine At This Time   Equipment Owned None   Lives with - Patient's Self Care Capacity Spouse   Comments Pt poor  historian. Per chart lives with spouse who is her caregiver. Pt reported no AD   Prior Level of Functional Mobility   Bed Mobility Unable To Determine At This Time   Transfer Status Unable To Determine At This Time   Ambulation Unable To Determine At This Time   Comments pt poor historian, unable to provide report. Appears spouse has been assisting her mobility but extent is unknown   History of Falls   History of Falls Yes   Date of Last Fall   (multiple recent falls)   Cognition    Level of Consciousness Confused   Passive ROM Lower Body   Passive ROM Lower Body WDL   Active ROM Lower Body    Active ROM Lower Body  WDL   Strength Lower Body   Lower Body Strength  WDL   Sensation Lower Body   Lower Extremity Sensation   WDL   Balance Assessment   Sitting Balance (Static) Fair   Sitting Balance (Dynamic) Fair -   Standing Balance (Static) Fair -   Standing Balance (Dynamic) Poor +   Weight Shift Sitting Fair   Weight Shift Standing Poor   Comments HHA   Gait Analysis   Gait Level Of Assist Minimal Assist   Assistive Device Hand Held Assist   Distance (Feet) 35   # of Times Distance was Traveled 2   Weight Bearing Status no restrictions   Bed Mobility    Supine to Sit Moderate Assist   Sit to Supine Minimal Assist   Scooting Minimal Assist   Functional Mobility   Sit to Stand Minimal Assist   Short Term Goals    Short Term Goal # 1 Pt will perform supine <> sit with SPV in 6 visits to get in/out of bed   Short Term Goal # 2 Pt will perform functional transfers with SPV in 6 visits to increase independence   Short Term Goal # 3 Pt will ambulate 100ft with FWW and SPV in 6 visits to increase independence

## 2021-01-15 NOTE — DIETARY
Nutrition Services: Pt with BMI < 19    Pt is a 74 yo F admitted for altered mental status and on day 1 of admit on a Regular/Easy to chew diet.    Ht: 162.6 cm  Wt: 46.4 kg   BMI: 17.55 kg/m^2 (underweight)    Low BMI only nutrition risk trigger present at this time. Reviewed weight / BMI hx in chart review:    8/31/20 = 45.4 kg (100 lb)  12/31/19 = 45.5 kg (100 lb)    Low BMI appears to be baseline and has been stable for > 1 year. Per ADL documentation pt consumed % of breakfast this morning. Do not feel full nutrition assessment is indicated at this time, however please consult if concerns regarding nutritional intake/status arise.     RD available prn - and will monitor per dept policy

## 2021-01-15 NOTE — ED NOTES
Pt aox1, skin pink warm and dry, airway patent, rr even and unlabored, nad noted. No new complaints. Pt vss. Pt transports to floor with transport team in stable condition.

## 2021-01-15 NOTE — DISCHARGE INSTRUCTIONS
Discharge Instructions    Discharged to home by car with relative. Discharged via wheelchair, hospital escort: Yes.  Special equipment needed: Not Applicable    Be sure to schedule a follow-up appointment with your primary care doctor or any specialists as instructed.     Discharge Plan:     1.  Follow-up with primary care physician to discuss advanced directives, dementia goals of care, POLST    2.  Discussed with primary care physician the need for PT/OT, home health, and planning for future needs as dementia progresses    3.  Continue with antibiotics for urinary tract infection, end date 1/19      Diet Plan: Discussed  Activity Level: Discussed  Confirmed Follow up Appointment: Patient to Call and Schedule Appointment  Confirmed Symptoms Management: Discussed  Medication Reconciliation Updated: Yes    I understand that a diet low in cholesterol, fat, and sodium is recommended for good health. Unless I have been given specific instructions below for another diet, I accept this instruction as my diet prescription.   Other diet: As tolerated    Special Instructions: None    · Is patient discharged on Warfarin / Coumadin?   No     Depression / Suicide Risk    As you are discharged from this [facility] facility, it is important to learn how to keep safe from harming yourself.    Recognize the warning signs:  · Abrupt changes in personality, positive or negative- including increase in energy   · Giving away possessions  · Change in eating patterns- significant weight changes-  positive or negative  · Change in sleeping patterns- unable to sleep or sleeping all the time   · Unwillingness or inability to communicate  · Depression  · Unusual sadness, discouragement and loneliness  · Talk of wanting to die  · Neglect of personal appearance   · Rebelliousness- reckless behavior  · Withdrawal from people/activities they love  · Confusion- inability to concentrate     If you or a loved one observes any of these behaviors  or has concerns about self-harm, here's what you can do:  · Talk about it- your feelings and reasons for harming yourself  · Remove any means that you might use to hurt yourself (examples: pills, rope, extension cords, firearm)  · Get professional help from the community (Mental Health, Substance Abuse, psychological counseling)  · Do not be alone:Call your Safe Contact- someone whom you trust who will be there for you.  · Call your local CRISIS HOTLINE 366-6733 or 133-607-9199  · Call your local Children's Mobile Crisis Response Team [place] (667) 744-8297 or www."[employer]  · Call the toll free National Suicide Prevention Hotlines   · National Suicide Prevention Lifeline 020-417-FHLO (9532)  · National Hope Line Network 800-SUICIDE (198-3404)

## 2021-02-03 ENCOUNTER — HOSPITAL ENCOUNTER (OUTPATIENT)
Dept: RADIOLOGY | Facility: MEDICAL CENTER | Age: 76
End: 2021-02-03
Attending: PSYCHIATRY & NEUROLOGY
Payer: COMMERCIAL

## 2021-02-03 DIAGNOSIS — F03.90 DEMENTIA WITHOUT BEHAVIORAL DISTURBANCE, UNSPECIFIED DEMENTIA TYPE: ICD-10-CM

## 2021-02-03 PROCEDURE — 70551 MRI BRAIN STEM W/O DYE: CPT

## 2021-03-25 ENCOUNTER — TELEPHONE (OUTPATIENT)
Dept: NEUROLOGY | Facility: MEDICAL CENTER | Age: 76
End: 2021-03-25

## 2021-03-25 NOTE — TELEPHONE ENCOUNTER
Bang  A physical therapist([phone]) from [facility] stating the pt is discharged from their care for home care because she regressed. She stated we need to follow up with her  as he is repeatedly asking about her dementia despite repeated explanation. He does not made any changes to her medication that was recommended by Dr Monzon stating he doesn't recall. She said the pt had the MRI but she didn't receive the result and she thinks the patient's  may not follow up on this and for us to reach out to him.

## 2021-03-29 ENCOUNTER — OFFICE VISIT (OUTPATIENT)
Dept: NEUROLOGY | Facility: MEDICAL CENTER | Age: 76
End: 2021-03-29
Attending: PSYCHIATRY & NEUROLOGY
Payer: COMMERCIAL

## 2021-03-29 VITALS
DIASTOLIC BLOOD PRESSURE: 62 MMHG | WEIGHT: 104.5 LBS | TEMPERATURE: 98.8 F | RESPIRATION RATE: 14 BRPM | OXYGEN SATURATION: 96 % | HEART RATE: 61 BPM | HEIGHT: 64 IN | BODY MASS INDEX: 17.84 KG/M2 | SYSTOLIC BLOOD PRESSURE: 90 MMHG

## 2021-03-29 DIAGNOSIS — F03.90 DEMENTIA WITHOUT BEHAVIORAL DISTURBANCE, UNSPECIFIED DEMENTIA TYPE: ICD-10-CM

## 2021-03-29 PROCEDURE — 99211 OFF/OP EST MAY X REQ PHY/QHP: CPT | Performed by: PSYCHIATRY & NEUROLOGY

## 2021-03-29 PROCEDURE — 99214 OFFICE O/P EST MOD 30 MIN: CPT | Performed by: PSYCHIATRY & NEUROLOGY

## 2021-03-29 ASSESSMENT — FIBROSIS 4 INDEX: FIB4 SCORE: 3.24

## 2021-03-29 NOTE — PROGRESS NOTES
Chief Complaint   Patient presents with   • Follow-Up     Dementia without behavioral disturbance, unspecified dementia type      History of present illness:  Shirley Coffey 75 y.o. female presents today for memory loss f/u.   History is obtained from significant other.  and Patient is accompanied by  Don.  Patient provides minimal history and does not answer questions.   45+ years.    Duration/timing: Gradual onset approximately 2018 or possibly sooner, progressive  Context:   Memory loss: Described as difficulty remembering words, conversations, appointments involving into profound cognitive decline.  Father with a history of dementia starting in the early 90s. Currently, she doesn't know where bathroom is in house she has lived in it for 20 years. Doesn't know how to open the fridge. Difficulty dressing herself. Forgets name of her  who is her sole caretaker. More recently since spring 2020 she has been having more shuffling walk and instability, avoids eye contact when speaking with someone, leans over and walks leaning forward, soft speech, talks to non existent people. Sleeps well throughout the night. Hallucinations are not bothersome to her.  Excessive daytime sleepiness.   Baseline: She does not drive - no DL. Needs help with ADLs (dressing) and IADLs. She can bath herself with  help adjusting water temp. Highest education: HS. Occupation: [employer], office work.   Location: brain  Quality: memory loss  Severity: severe  Modifying factors: worse with time  Associated signs/symptoms: Agitation and hallucinations may have proceeded memory loss?  is uncertain   Denies: bladder incontinence, bowel incontinence, headaches, vision changes/loss or diplopia, weakness, numbness/tingling, swallowing difficulties, speech disturbance, depression, anxiety, hearing loss, loss of consciousness, REM behavior disorder, seizures, abnormal movements, falls and HIV or syphilis risk  factors sleep disturbance, changes in appetite,     Patient has tried:  -Memantine - ran out refills and stopped refill, no benefit but no side effects restarted by me in 2020  -Zoloft - never tried  -Attempt to limit risperidone - Dr. Cuello psychiatrist, seen intermittently, for hallucinations - reportedly started about 2020 but was documented in Dr. HILARIA lemus in 2020  -Exelon 4.5mg - no difference   -Physical therapy, discharged from care    Subjective: Patient was last seen in neurology clinic on 2020.     Dementia: Patient has some's mild decline since last visit.  Physical therapy has discharged her from their care.  Today  reports that he helps her with most ADLs and all IADLs.  She is able to feed herself though he does have her prepare the meals.  Risperidone makes the biggest difference in terms of her behavior.  He has not noticed a significant difference with memantine or Exelon with regards to her condition.  She has been having some urinary greater than fecal incontinence.    Patient/ siblings are mostly in [place] and [place].  She has 1 son in town and they becoming closer.    Past medical history:   Past Medical History:   Diagnosis Date   • Arthritis    • Atherosclerosis of both carotid arteries    • Dementia (HCC)    • Macular pucker, left eye    • Memory loss    • Mixed hyperlipidemia with apolipoprotein E4 variant    • Vitamin D deficiency disease        Past surgical history:   Past Surgical History:   Procedure Laterality Date   • APPENDECTOMY     • HYSTERECTOMY, VAGINAL      No oophorectomy   • TONSILLECTOMY         Family history:   Family History   Problem Relation Age of Onset   • Dementia Father    • Cancer Sister 75        Breast Cancer   Mom  MVA at age 80s-90s.    Social history:   Tobacco Use   • Smoking status: Never Smoker   • Smokeless tobacco: Never Used   Substance and Sexual Activity   • Alcohol use: No     Alcohol/week: 0.0 oz   • Drug use: No   •  "Sexual activity: Yes     Partners: Male       Current medications:   Current Outpatient Medications   Medication   • aspirin EC (ECOTRIN) 81 MG Tablet Delayed Response   • risperiDONE (RISPERDAL) 1 MG Tab   • memantine (NAMENDA) 5 MG Tab   • rosuvastatin (CRESTOR) 20 MG Tab   • rivastigmine (EXELON) 4.5 MG capsule   • therapeutic multivitamin-minerals (THERAGRAN-M) Tab   • Cholecalciferol (VITAMIN D) 1000 UNIT CAPS     No current facility-administered medications for this visit.       Medication Allergy:  No Known Allergies    Review of Systems   Neurological:        As per HPI       Physical examination:   Vitals:    03/29/21 1052   BP: (!) 90/62   BP Location: Left arm   Patient Position: Sitting   BP Cuff Size: Adult   Pulse: 61   Resp: 14   Temp: 37.1 °C (98.8 °F)   TempSrc: Temporal   SpO2: 96%   Weight: 47.4 kg (104 lb 8 oz)   Height: 1.626 m (5' 4\")     General: Patient in well nourished in no apparent distress.  HENT: Normocephalic, atraumatic.   Cardiovascular: No lower extremity edema.  Respiratory: Normal respiratory effort.   Skin: No appreciable signs of acute rashes or bruising.   Musculoskeletal: No signs of joint or muscle swelling.   Psychiatric: Pleasant.      NEUROLOGICAL EXAM:   Mental status: Awake, oriented to person. Decreased attention. MOCA 12/30 (4/2020 with Dr. MANRIQUE).   Speech and language: Speech is  Hypophonic.   Cranial nerve exam:  III, IV, VI:  Patient has difficulty with upgaze.  VII: Limited by patient participation but no clear asymmetry at baseline.  VIII: Hearing intact to soft speech   IX: Dysarthria is not present.    Motor exam: prior exam  Muscle tone is increased in the bilateral upper extremities right greater than left.  There is some mild cogwheeling as well.  Difficulty getting patient to do rapid finger tapping.  There is reduced affect/bradyphrenia..  No appreciable tremor.    Muscle strength: Limited strength testing due to patient participation.  She seems to be moving " all extremities equally.    Sensory exam: prior exam  Intact to Light touch in bilateral upper and lower extremity.    Reflexes:  Prior exam     Right  Left  Biceps   0/4  0/4  Triceps  0/4  0/4  Brachioradialis 0/4  0/4  Knee jerk  1/4  1/4  Ankle jerk  0/4  0/4   bilateral toes are downgoing to plantar stimulation..  Positive glabellar.  No evidence of palmomental or snout.    Coordination: shows a normal finger-nose-finger.  Difficulty following heel-to-shin. Prior  Gait: Narrow based with stooped posture and reduced arm swing.  To slow and shuffling in nature.      ANCILLARY DATA REVIEWED:   Lab Data Review:  Lab Results   Component Value Date/Time    WBC 5.5 01/15/2021 01:14 AM    RBC 4.12 (L) 01/15/2021 01:14 AM    HEMOGLOBIN 13.3 01/15/2021 01:14 AM    HEMATOCRIT 40.5 01/15/2021 01:14 AM    MCV 98.3 (H) 01/15/2021 01:14 AM    MCH 32.3 01/15/2021 01:14 AM    MCHC 32.8 (L) 01/15/2021 01:14 AM    MPV 8.4 (L) 01/15/2021 01:14 AM    NEUTSPOLYS 70.10 01/15/2021 01:14 AM    LYMPHOCYTES 19.00 (L) 01/15/2021 01:14 AM    MONOCYTES 7.70 01/15/2021 01:14 AM    EOSINOPHILS 2.20 01/15/2021 01:14 AM    BASOPHILS 0.50 01/15/2021 01:14 AM      Lab Results   Component Value Date/Time    SODIUM 139 01/15/2021 01:14 AM    POTASSIUM 3.6 01/15/2021 01:14 AM    CHLORIDE 105 01/15/2021 01:14 AM    CO2 22 01/15/2021 01:14 AM    GLUCOSE 89 01/15/2021 01:14 AM    BUN 19 01/15/2021 01:14 AM    CREATININE 0.64 01/15/2021 01:14 AM     Lab Results   Component Value Date/Time    ASTSGOT 28 07/12/2020 1105    ALTSGPT 19 07/12/2020 1105    ALKPHOSPHAT 78 07/12/2020 1105    ALBUMIN 3.9 07/12/2020 1105     Vitamin B12 in 2018 425  Thyroid testing normal in 2020  EEG routine December 2020 (Nicolás):  Diffuse slowing and triphasic waves are consistent with widespread cortical dysfunction/encephalopathy but is not etiologically specific. If the clinical suspicion remains high for seizures, a prolonged recording to capture clinical or subclinical  events may be helpful.    Imaging:   MRI brain 2015:  1.  Age-related cerebral atrophy.   2. There are a few scattered punctate areas of of increased T2 signal intensity in the periventricular white matter consistent with areas of chronic ischemia, demyelination, or gliosis.    MRI brain without contrast February 2021:  1.  No evidence of acute territorial infarct, intracranial hemorrhage or mass lesion.  2.  Moderate diffuse cerebral substance loss with disproportionate moderate to severe atrophy involving the bilateral parietal regions. These findings have progressed significantly since previous exam.  3.  Mild microangiopathic ischemic change versus demyelination or gliosis.    Records reviewed: Physical therapy from continue him called, discharge patient from their home care because she has regressed.    January 2021 admission, UA was positive for UTI.  She was treated with antibiotics.        ASSESSMENT AND PLAN:    1. Dementia with behavioral disturbance, unspecified dementia type (HCC): Patient with a significant neurocognitive disorder with a [place] of 12 out of 30 in April 2020. Based on her clinical presentation and parkinsonism as well as early onset hallucinations this is concerning for probable Lewy body type dementia.  MRI brain with moderate diffuse cerebral substance loss with moderate to severe atrophy involving the bilateral parietal lobes.  EEG with diffuse slowing and triphasic waves which can be seen in advanced dementia.  Other differential does include Parkinson's dementia.  Vitamin B12 and thyroid testing unremarkable the last 2 years.  No prior history of psychiatric disease.  -Discussed weaning off of memantine and Exelon patch given no benefit, he would like to discuss with palliative care  -Palliative care per referral provided  -Discussed goals of care and expectations    2. Parkinsonism, unspecified Parkinsonism type (HCC): Uncertain etiology.  The history is not exactly clear in terms of  when medication was initiated specifically risperidone.  Patient is exhibiting bradykinesia, rigidity.  Differential does include Lewy body dementia versus secondary to medication.  -Caution with regards to increase in Risperdal if this truly is Lewy body dementia as she is at risk for significant adverse side effects, this was discussed with  previously    3. Hallucinations: Patient with chronic visual hallucinations of uncertain etiology but seems to be related to her dementia.  Given the reportedly early onset of visual hallucinations this is suspicious for Lewy body dementia as documented above.  She is on risperidone. Recommend risks benefits discussion with psychiatry given risks of antipsychotics . Currently her hallucinations are not bothersome.  -Monitor clinically, she is benefiting most from risperidone      FOLLOW-UP: Return in about 3 months (around 6/29/2021).  For clinical monitoring and per  preference  EDUCATION AND COUNSELING:  -I personally discussed the following with the patient:   Diagnosis, prognosis, and treatment options discussed with patient at length.   and workup to date, prognosis and treatment expectations, goals of palliative care    This dictation was created using voice recognition software. I have made every reasonable attempt to avoid dictation errors, but this document may contain an error not identified before finalizing. If the error changes the accuracy of the document, I would appreciate it being brought to my attention. Thank you very much.     Elizabeth Monzon MD  Neurology  [facility]

## 2021-04-24 ENCOUNTER — HOSPITAL ENCOUNTER (OUTPATIENT)
Dept: RADIOLOGY | Facility: MEDICAL CENTER | Age: 76
End: 2021-04-24
Attending: PAIN MEDICINE
Payer: COMMERCIAL

## 2021-04-24 DIAGNOSIS — M54.50 LOW BACK PAIN, UNSPECIFIED BACK PAIN LATERALITY, UNSPECIFIED CHRONICITY, UNSPECIFIED WHETHER SCIATICA PRESENT: ICD-10-CM

## 2021-04-24 PROCEDURE — 72148 MRI LUMBAR SPINE W/O DYE: CPT | Mod: MH

## 2021-06-28 ENCOUNTER — OFFICE VISIT (OUTPATIENT)
Dept: NEUROLOGY | Facility: MEDICAL CENTER | Age: 76
End: 2021-06-28
Attending: PSYCHIATRY & NEUROLOGY
Payer: OTHER GOVERNMENT

## 2021-06-28 VITALS
TEMPERATURE: 98.7 F | BODY MASS INDEX: 17.43 KG/M2 | SYSTOLIC BLOOD PRESSURE: 118 MMHG | RESPIRATION RATE: 16 BRPM | WEIGHT: 102.07 LBS | OXYGEN SATURATION: 96 % | HEIGHT: 64 IN | HEART RATE: 80 BPM | DIASTOLIC BLOOD PRESSURE: 74 MMHG

## 2021-06-28 DIAGNOSIS — G20.C PARKINSONISM, UNSPECIFIED PARKINSONISM TYPE (HCC): ICD-10-CM

## 2021-06-28 DIAGNOSIS — F03.90 DEMENTIA WITHOUT BEHAVIORAL DISTURBANCE, UNSPECIFIED DEMENTIA TYPE: ICD-10-CM

## 2021-06-28 PROBLEM — Z91.89 AT RISK FOR POLYPHARMACY: Status: ACTIVE | Noted: 2021-06-03

## 2021-06-28 PROCEDURE — 99214 OFFICE O/P EST MOD 30 MIN: CPT | Mod: GW | Performed by: PSYCHIATRY & NEUROLOGY

## 2021-06-28 PROCEDURE — 99202 OFFICE O/P NEW SF 15 MIN: CPT | Performed by: PSYCHIATRY & NEUROLOGY

## 2021-06-28 ASSESSMENT — FIBROSIS 4 INDEX: FIB4 SCORE: 3.24

## 2021-06-28 NOTE — PROGRESS NOTES
Chief Complaint   Patient presents with   • Follow-Up     Dementia without behavioral disturbance, unspecified dementia type      History of present illness:  Shirley Coffey 75 y.o. female presents today for dementia f/u.   History is obtained from significant other.  and Patient is accompanied by  Don.  Patient provides minimal history and does not answer questions.   45+ years.     Duration/timing: Gradual onset approximately 2018 or possibly sooner, progressive  Context:   Memory loss: Described as difficulty remembering words, conversations, appointments involving into profound cognitive decline.  Father with a history of dementia starting in the early 90s. Currently, she doesn't know where bathroom is in house she has lived in it for 20 years. Doesn't know how to open the fridge. Difficulty dressing herself. Forgets name of her  who is her sole caretaker. More recently since spring 2020 she has been having more shuffling walk and instability, avoids eye contact when speaking with someone, leans over and walks leaning forward, soft speech, talks to non existent people. Sleeps well throughout the night. Hallucinations are not bothersome to her.  Excessive daytime sleepiness.   Baseline: She does not drive - no DL. Needs help with ADLs (dressing) and IADLs. She can bath herself with  help adjusting water temp. Highest education: HS. Occupation: [employer], office work.   Location: brain  Quality: memory loss  Severity: severe  Modifying factors: worse with time  Associated signs/symptoms: Agitation and hallucinations may have proceeded memory loss?  is uncertain   Denies: bladder incontinence, bowel incontinence, headaches, vision changes/loss or diplopia, weakness, numbness/tingling, swallowing difficulties, speech disturbance, depression, anxiety, hearing loss, loss of consciousness, REM behavior disorder, seizures, abnormal movements, falls and HIV or syphilis risk  factors sleep disturbance, changes in appetite,     Patient has tried:  -Memantine - ran out refills and stopped refill, no benefit but no side effects restarted by me in 2020  -Zoloft - never tried  -Attempt to limit risperidone - Dr. Cuello psychiatrist, seen intermittently, for hallucinations - reportedly started about 2020 but was documented in Dr. HILARIA lemus in 2020  -Exelon 4.5mg - no difference   -Physical therapy, discharged from care    Subjective: Patient was last seen in neurology clinic on 2021.    Dementia: they have consulted palliative and they have home visits and it helps them. She is mellow. No aggressive behaviors or hallucination. No incontinence though she has had occasional episodes that seemed more deliberate. Appetite is good.      Don is having difficulty seeing his wife going through this. He has some caregiver respite soon since he is enrolling [facility] at a care center 3 days a week.     Past medical history:   Past Medical History:   Diagnosis Date   • Arthritis    • Atherosclerosis of both carotid arteries    • Dementia (HCC)    • Macular pucker, left eye    • Memory loss    • Mixed hyperlipidemia with apolipoprotein E4 variant    • Vitamin D deficiency disease        Past surgical history:   Past Surgical History:   Procedure Laterality Date   • APPENDECTOMY     • HYSTERECTOMY, VAGINAL      No oophorectomy   • TONSILLECTOMY         Family history:   Family History   Problem Relation Age of Onset   • Dementia Father    • Cancer Sister 75        Breast Cancer   Mom  MVA at age 80s-90s.    Social history:   Tobacco Use   • Smoking status: Never Smoker   • Smokeless tobacco: Never Used   Substance and Sexual Activity   • Alcohol use: No     Alcohol/week: 0.0 oz   • Drug use: No   • Sexual activity: Yes     Partners: Male       Current medications:   Current Outpatient Medications   Medication   • aspirin EC (ECOTRIN) 81 MG Tablet Delayed Response   • risperiDONE  "(RISPERDAL) 1 MG Tab   • memantine (NAMENDA) 5 MG Tab   • rosuvastatin (CRESTOR) 20 MG Tab   • rivastigmine (EXELON) 4.5 MG capsule   • therapeutic multivitamin-minerals (THERAGRAN-M) Tab   • Cholecalciferol (VITAMIN D) 1000 UNIT CAPS     No current facility-administered medications for this visit.       Medication Allergy:  No Known Allergies    Review of Systems   Neurological:        As per HPI       Physical examination:   Vitals:    06/28/21 1540   BP: 118/74   BP Location: Left arm   Patient Position: Sitting   BP Cuff Size: Adult   Pulse: 80   Resp: 16   Temp: 37.1 °C (98.7 °F)   TempSrc: Temporal   SpO2: 96%   Weight: 46.3 kg (102 lb 1.2 oz)   Height: 1.626 m (5' 4.02\")     General: Patient in well nourished in no apparent distress.  Psychiatric: Pleasant. Does not make eye contact.    NEUROLOGICAL EXAM:   Mental status: Awake, oriented to person. Decreased attention. MOCA 12/30 (4/2020 with Dr. MANRIQUE).   Speech and language: Speech is  Hypophonic.   Cranial nerve exam: not changed  III, IV, VI:  Patient has difficulty with upgaze.  VII: Limited by patient participation but no clear asymmetry at baseline.  VIII: Hearing intact to soft speech   IX: Dysarthria is not present.    Motor exam:   Muscle tone is increased in the bilateral upper extremities right greater than left.  There is some mild cogwheeling as well.  Difficulty getting patient to do rapid finger tapping.  There is reduced affect/bradyphrenia..  No appreciable tremor. No change today.    Muscle strength: Limited strength testing due to patient participation.  She seems to be moving all extremities equally.    Sensory exam: prior exam  Intact to Light touch in bilateral upper and lower extremity.    Reflexes:  Prior exam     Right  Left  Biceps   0/4  0/4  Triceps  0/4  0/4  Brachioradialis 0/4  0/4  Knee jerk  1/4  1/4  Ankle jerk  0/4  0/4   bilateral toes are downgoing to plantar stimulation..  Positive glabellar.  No evidence of palmomental or " snout.    Coordination: shows a normal finger-nose-finger.  Difficulty following heel-to-shin. Prior  Gait: Narrow based with stooped posture and reduced arm swing.  To slow and shuffling in nature.      ANCILLARY DATA REVIEWED:   Lab Data Review:  Lab Results   Component Value Date/Time    WBC 5.5 01/15/2021 01:14 AM    RBC 4.12 (L) 01/15/2021 01:14 AM    HEMOGLOBIN 13.3 01/15/2021 01:14 AM    HEMATOCRIT 40.5 01/15/2021 01:14 AM    MCV 98.3 (H) 01/15/2021 01:14 AM    MCH 32.3 01/15/2021 01:14 AM    MCHC 32.8 (L) 01/15/2021 01:14 AM    MPV 8.4 (L) 01/15/2021 01:14 AM    NEUTSPOLYS 70.10 01/15/2021 01:14 AM    LYMPHOCYTES 19.00 (L) 01/15/2021 01:14 AM    MONOCYTES 7.70 01/15/2021 01:14 AM    EOSINOPHILS 2.20 01/15/2021 01:14 AM    BASOPHILS 0.50 01/15/2021 01:14 AM      Lab Results   Component Value Date/Time    SODIUM 139 01/15/2021 01:14 AM    POTASSIUM 3.6 01/15/2021 01:14 AM    CHLORIDE 105 01/15/2021 01:14 AM    CO2 22 01/15/2021 01:14 AM    GLUCOSE 89 01/15/2021 01:14 AM    BUN 19 01/15/2021 01:14 AM    CREATININE 0.64 01/15/2021 01:14 AM     Lab Results   Component Value Date/Time    ASTSGOT 28 07/12/2020 1105    ALTSGPT 19 07/12/2020 1105    ALKPHOSPHAT 78 07/12/2020 1105    ALBUMIN 3.9 07/12/2020 1105     Vitamin B12 in 2018 425  Thyroid testing normal in 2020  EEG routine December 2020 (Nicolás):  Diffuse slowing and triphasic waves are consistent with widespread cortical dysfunction/encephalopathy but is not etiologically specific. If the clinical suspicion remains high for seizures, a prolonged recording to capture clinical or subclinical events may be helpful.    Imaging:   MRI brain 2015:  1.  Age-related cerebral atrophy.   2. There are a few scattered punctate areas of of increased T2 signal intensity in the periventricular white matter consistent with areas of chronic ischemia, demyelination, or gliosis.    MRI brain without contrast February 2021:  1.  No evidence of acute territorial infarct,  intracranial hemorrhage or mass lesion.  2.  Moderate diffuse cerebral substance loss with disproportionate moderate to severe atrophy involving the bilateral parietal regions. These findings have progressed significantly since previous exam.  3.  Mild microangiopathic ischemic change versus demyelination or gliosis.    Records reviewed: Chart reviewed, patient not admitted to the hospital at [facility] since last visit.        ASSESSMENT AND PLAN:    1. Dementia with behavioral disturbance, unspecified dementia type (HCC): Patient with a significant neurocognitive disorder with a [place] of 12 out of 30 in April 2020. Based on her clinical presentation and parkinsonism as well as early onset hallucinations this is concerning for probable Lewy body type dementia.  MRI brain with moderate diffuse cerebral substance loss with moderate to severe atrophy involving the bilateral parietal lobes.  EEG with diffuse slowing and triphasic waves which can be seen in advanced dementia.   Vitamin B12 and thyroid testing unremarkable the last 2 years.  No prior history of psychiatric disease.  Patient does not have capacity to make decisions.  -Rediscussed goals of care and expectations  -Pending respite care  -Palliative following  -Discussed caregiver health, recommended he reach out to primary care for behavioral health referral talk therapy at the minimum  -Continue Exelon and memantine    2. Parkinsonism, unspecified Parkinsonism type (HCC): Uncertain etiology.  The history is not exactly clear in terms of when medication was initiated specifically risperidone.  Patient is exhibiting bradykinesia, rigidity.  Differential does include Lewy body dementia versus secondary to medication.  -Caution with regards to increase in Risperdal if this truly is Lewy body dementia as she is at risk for significant adverse side effects, this was discussed with  previously    3. Hallucinations: Patient with chronic visual hallucinations of  uncertain etiology but seems to be related to her dementia.  Given the reportedly early onset of visual hallucinations this is suspicious for Lewy body dementia as documented above.  She is on risperidone. Recommend risks benefits discussion with psychiatry given risks of antipsychotics . Currently her hallucinations are not bothersome.  -Monitor clinically, she is benefiting most from risperidone      FOLLOW-UP: Return in about 6 months (around 12/28/2021).  For clinical monitoring   EDUCATION AND COUNSELING:  -I personally discussed the following with the patient:   Diagnosis, prognosis, and treatment options discussed with patient at length.   and workup to date, prognosis and treatment expectations, goals of palliative care, emotional support provided    This dictation was created using voice recognition software. I have made every reasonable attempt to avoid dictation errors, but this document may contain an error not identified before finalizing. If the error changes the accuracy of the document, I would appreciate it being brought to my attention. Thank you very much.     Elizabeth Monzon MD  Neurology

## 2022-01-12 ENCOUNTER — HOSPITAL ENCOUNTER (OUTPATIENT)
Dept: LAB | Facility: MEDICAL CENTER | Age: 77
End: 2022-01-12
Attending: NURSE PRACTITIONER
Payer: COMMERCIAL

## 2022-01-12 LAB
ALBUMIN SERPL BCP-MCNC: 4.2 G/DL (ref 3.2–4.9)
ALBUMIN/GLOB SERPL: 1.8 G/DL
ALP SERPL-CCNC: 119 U/L (ref 30–99)
ALT SERPL-CCNC: 10 U/L (ref 2–50)
ANION GAP SERPL CALC-SCNC: 7 MMOL/L (ref 7–16)
AST SERPL-CCNC: 20 U/L (ref 12–45)
BASOPHILS # BLD AUTO: 0.8 % (ref 0–1.8)
BASOPHILS # BLD: 0.04 K/UL (ref 0–0.12)
BILIRUB SERPL-MCNC: 0.3 MG/DL (ref 0.1–1.5)
BUN SERPL-MCNC: 18 MG/DL (ref 8–22)
CALCIUM SERPL-MCNC: 10 MG/DL (ref 8.5–10.5)
CHLORIDE SERPL-SCNC: 106 MMOL/L (ref 96–112)
CO2 SERPL-SCNC: 29 MMOL/L (ref 20–33)
CREAT SERPL-MCNC: 0.78 MG/DL (ref 0.5–1.4)
EOSINOPHIL # BLD AUTO: 0.08 K/UL (ref 0–0.51)
EOSINOPHIL NFR BLD: 1.5 % (ref 0–6.9)
ERYTHROCYTE [DISTWIDTH] IN BLOOD BY AUTOMATED COUNT: 42.7 FL (ref 35.9–50)
GLOBULIN SER CALC-MCNC: 2.3 G/DL (ref 1.9–3.5)
GLUCOSE SERPL-MCNC: 87 MG/DL (ref 65–99)
HCT VFR BLD AUTO: 43.6 % (ref 37–47)
HGB BLD-MCNC: 14.5 G/DL (ref 12–16)
IMM GRANULOCYTES # BLD AUTO: 0.03 K/UL (ref 0–0.11)
IMM GRANULOCYTES NFR BLD AUTO: 0.6 % (ref 0–0.9)
LYMPHOCYTES # BLD AUTO: 0.88 K/UL (ref 1–4.8)
LYMPHOCYTES NFR BLD: 17 % (ref 22–41)
MCH RBC QN AUTO: 31.7 PG (ref 27–33)
MCHC RBC AUTO-ENTMCNC: 33.3 G/DL (ref 33.6–35)
MCV RBC AUTO: 95.2 FL (ref 81.4–97.8)
MONOCYTES # BLD AUTO: 0.35 K/UL (ref 0–0.85)
MONOCYTES NFR BLD AUTO: 6.8 % (ref 0–13.4)
NEUTROPHILS # BLD AUTO: 3.79 K/UL (ref 2–7.15)
NEUTROPHILS NFR BLD: 73.3 % (ref 44–72)
NRBC # BLD AUTO: 0 K/UL
NRBC BLD-RTO: 0 /100 WBC
PLATELET # BLD AUTO: 174 K/UL (ref 164–446)
PMV BLD AUTO: 8.7 FL (ref 9–12.9)
POTASSIUM SERPL-SCNC: 4.3 MMOL/L (ref 3.6–5.5)
PROT SERPL-MCNC: 6.5 G/DL (ref 6–8.2)
RBC # BLD AUTO: 4.58 M/UL (ref 4.2–5.4)
SODIUM SERPL-SCNC: 142 MMOL/L (ref 135–145)
T4 FREE SERPL-MCNC: 0.93 NG/DL (ref 0.93–1.7)
TSH SERPL DL<=0.005 MIU/L-ACNC: 2.31 UIU/ML (ref 0.38–5.33)
WBC # BLD AUTO: 5.2 K/UL (ref 4.8–10.8)

## 2022-01-12 PROCEDURE — 85025 COMPLETE CBC W/AUTO DIFF WBC: CPT

## 2022-01-12 PROCEDURE — 84443 ASSAY THYROID STIM HORMONE: CPT

## 2022-01-12 PROCEDURE — 36415 COLL VENOUS BLD VENIPUNCTURE: CPT

## 2022-01-12 PROCEDURE — 80053 COMPREHEN METABOLIC PANEL: CPT

## 2022-01-12 PROCEDURE — 84439 ASSAY OF FREE THYROXINE: CPT

## 2022-03-29 ENCOUNTER — OFFICE VISIT (OUTPATIENT)
Dept: NEUROLOGY | Facility: MEDICAL CENTER | Age: 77
End: 2022-03-29
Attending: PSYCHIATRY & NEUROLOGY
Payer: MEDICARE

## 2022-03-29 VITALS
BODY MASS INDEX: 18.25 KG/M2 | DIASTOLIC BLOOD PRESSURE: 68 MMHG | SYSTOLIC BLOOD PRESSURE: 112 MMHG | WEIGHT: 103 LBS | HEIGHT: 63 IN | TEMPERATURE: 98 F | HEART RATE: 71 BPM | OXYGEN SATURATION: 96 %

## 2022-03-29 DIAGNOSIS — F03.91 DEMENTIA WITH BEHAVIORAL DISTURBANCE, UNSPECIFIED DEMENTIA TYPE: ICD-10-CM

## 2022-03-29 PROCEDURE — 99214 OFFICE O/P EST MOD 30 MIN: CPT | Mod: GW | Performed by: PSYCHIATRY & NEUROLOGY

## 2022-03-29 PROCEDURE — 99211 OFF/OP EST MAY X REQ PHY/QHP: CPT | Performed by: PSYCHIATRY & NEUROLOGY

## 2022-03-29 RX ORDER — MULTIVIT WITH MINERALS/LUTEIN
TABLET ORAL
COMMUNITY
Start: 2021-05-01 | End: 2022-09-27

## 2022-03-29 ASSESSMENT — PATIENT HEALTH QUESTIONNAIRE - PHQ9: CLINICAL INTERPRETATION OF PHQ2 SCORE: 0

## 2022-03-29 ASSESSMENT — FIBROSIS 4 INDEX: FIB4 SCORE: 2.73

## 2022-03-29 NOTE — PROGRESS NOTES
Chief Complaint   Patient presents with   • Follow-Up     Memory Disorder     History of present illness:  Shirley Coffey 77 y.o. female presents today for dementia f/u.   History is obtained from significant other.  and Patient is accompanied by  Don.  Patient provides minimal history and does not answer questions.   45+ years.     Duration/timing: Gradual onset approximately 2018 or possibly sooner, progressive  Context:   Memory loss: Described as difficulty remembering words, conversations, appointments involving into profound cognitive decline.  Father with a history of dementia starting in the early 90s. Currently, she doesn't know where bathroom is in house she has lived in it for 20 years. Doesn't know how to open the fridge. Difficulty dressing herself. Forgets name of her  who is her sole caretaker. More recently since spring 2020 she has been having more shuffling walk and instability, avoids eye contact when speaking with someone, leans over and walks leaning forward, soft speech, talks to non existent people. Sleeps well throughout the night. Hallucinations are not bothersome to her.  Excessive daytime sleepiness.   Baseline: She does not drive - no DL. Needs help with ADLs (dressing) and IADLs. She can bath herself with  help adjusting water temp. Highest education: HS. Occupation: [employer], office work.   Location: brain  Quality: memory loss  Severity: severe  Modifying factors: worse with time  Associated signs/symptoms: Agitation and hallucinations may have proceeded memory loss?  is uncertain   Denies: bladder incontinence, bowel incontinence, headaches, vision changes/loss or diplopia, weakness, numbness/tingling, swallowing difficulties, speech disturbance, depression, anxiety, hearing loss, loss of consciousness, REM behavior disorder, seizures, abnormal movements, falls and HIV or syphilis risk factors sleep disturbance, changes in appetite,      Patient has tried:  -Memantine - ran out refills and stopped refill, no benefit but no side effects restarted by me in 2020  -Zoloft - never tried  -Attempt to limit risperidone - Dr. Cuello psychiatrist, seen intermittently, for hallucinations - reportedly started about 2020 but was documented in Dr. HILARIA lemus in 2020  -Exelon 4.5mg - no difference   -Physical therapy, discharged from care    Subjective: Patient was last seen in neurology clinic on 2021.    Dementia: Continued progression.  Patient is incontinent now.  Memantine was discontinued.  She continues to be on the Exelon however.  It is more difficult to care for her and Ashleigh is considering a memory care though this is a very difficult decision for him.  He is willing to put up a gait to prevent falls at home related to a particular room with a step.    ADLs: dependent on   IADLs: dependent on   Falls: couple, fell down a step at home  Hallucinations: Occasional but not bothersome  Sleep: good  Appetite: god  Mood: good  Behavior/personality: stable      Past medical history:   Past Medical History:   Diagnosis Date   • Arthritis    • Atherosclerosis of both carotid arteries    • Dementia (HCC)    • Macular pucker, left eye    • Memory loss    • Mixed hyperlipidemia with apolipoprotein E4 variant    • Vitamin D deficiency disease        Past surgical history:   Past Surgical History:   Procedure Laterality Date   • APPENDECTOMY     • HYSTERECTOMY, VAGINAL      No oophorectomy   • TONSILLECTOMY         Family history:   Family History   Problem Relation Age of Onset   • Dementia Father    • Cancer Sister 75        Breast Cancer   Mom  MVA at age 80s-90s.    Social history:   Tobacco Use   • Smoking status: Never Smoker   • Smokeless tobacco: Never Used   Substance and Sexual Activity   • Alcohol use: No     Alcohol/week: 0.0 oz   • Drug use: No       Current medications:   Current Outpatient Medications   Medication   •  "Ascorbic Acid (VITAMIN C) 1000 MG Tab   • risperiDONE (RISPERDAL) 1 MG Tab     No current facility-administered medications for this visit.       Medication Allergy:  No Known Allergies    Review of Systems   Neurological:        As per HPI       Physical examination:   Vitals:    03/29/22 1131   BP: 112/68   BP Location: Right arm   Patient Position: Sitting   BP Cuff Size: Adult   Pulse: 71   Temp: 36.7 °C (98 °F)   TempSrc: Temporal   SpO2: 96%   Weight: 46.7 kg (103 lb)   Height: 1.6 m (5' 3\")     General: Patient in well nourished in no apparent distress.  Psychiatric: Pleasant. Does not make eye contact.    NEUROLOGICAL EXAM:   Mental status: Awake. Decreased attention. MOCA 12/30 (4/2020 with Dr. MANRIQUE).   Speech and language: Speech is  Hypophonic.   Cranial nerve exam: not changed  III, IV, VI:  Patient has difficulty with upgaze.  VII: Limited by patient participation but no clear asymmetry at baseline.  VIII: Hearing intact to soft speech   IX: Dysarthria is not present.    Motor exam:   Muscle tone is increased in the bilateral upper extremities right greater than left.  There is some mild cogwheeling as well.  Difficulty getting patient to do rapid finger tapping.  There is reduced affect/bradyphrenia..  No appreciable tremor. No change today.    Muscle strength: Limited strength testing due to patient participation.  She seems to be moving all extremities equally.    Sensory exam: prior exam  Intact to Light touch in bilateral upper and lower extremity.    Reflexes:  Prior exam     Right  Left  Biceps   0/4  0/4  Triceps  0/4  0/4  Brachioradialis 0/4  0/4  Knee jerk  1/4  1/4  Ankle jerk  0/4  0/4   bilateral toes are downgoing to plantar stimulation..  Positive glabellar.  No evidence of palmomental or snout.    Coordination: shows a normal finger-nose-finger.  Difficulty following heel-to-shin. Prior  Gait: Narrow based with stooped posture and reduced arm swing.  Slow and shuffling in nature.  Armswing " reduced on the right.      ANCILLARY DATA REVIEWED:   Lab Data Review:  Lab Results   Component Value Date/Time    WBC 5.2 01/12/2022 10:24 AM    RBC 4.58 01/12/2022 10:24 AM    HEMOGLOBIN 14.5 01/12/2022 10:24 AM    HEMATOCRIT 43.6 01/12/2022 10:24 AM    MCV 95.2 01/12/2022 10:24 AM    MCH 31.7 01/12/2022 10:24 AM    MCHC 33.3 (L) 01/12/2022 10:24 AM    MPV 8.7 (L) 01/12/2022 10:24 AM    NEUTSPOLYS 73.30 (H) 01/12/2022 10:24 AM    LYMPHOCYTES 17.00 (L) 01/12/2022 10:24 AM    MONOCYTES 6.80 01/12/2022 10:24 AM    EOSINOPHILS 1.50 01/12/2022 10:24 AM    BASOPHILS 0.80 01/12/2022 10:24 AM      Lab Results   Component Value Date/Time    SODIUM 142 01/12/2022 10:24 AM    POTASSIUM 4.3 01/12/2022 10:24 AM    CHLORIDE 106 01/12/2022 10:24 AM    CO2 29 01/12/2022 10:24 AM    GLUCOSE 87 01/12/2022 10:24 AM    BUN 18 01/12/2022 10:24 AM    CREATININE 0.78 01/12/2022 10:24 AM     Lab Results   Component Value Date/Time    ASTSGOT 28 07/12/2020 1105    ALTSGPT 19 07/12/2020 1105    ALKPHOSPHAT 78 07/12/2020 1105    ALBUMIN 3.9 07/12/2020 1105     Vitamin B12 in 2018 425  Thyroid testing normal in 2020  EEG routine December 2020 (Nicol[facility]):  Diffuse slowing and triphasic waves are consistent with widespread cortical dysfunction/encephalopathy but is not etiologically specific. If the clinical suspicion remains high for seizures, a prolonged recording to capture clinical or subclinical events may be helpful.    Imaging:   MRI brain 2015:  1.  Age-related cerebral atrophy.   2. There are a few scattered punctate areas of of increased T2 signal intensity in the periventricular white matter consistent with areas of chronic ischemia, demyelination, or gliosis.    MRI brain without contrast February 2021:  1.  No evidence of acute territorial infarct, intracranial hemorrhage or mass lesion.  2.  Moderate diffuse cerebral substance loss with disproportionate moderate to severe atrophy involving the bilateral parietal regions. These  findings have progressed significantly since previous exam.  3.  Mild microangiopathic ischemic change versus demyelination or gliosis.    Records reviewed: None        ASSESSMENT AND PLAN:    1. Dementia with behavioral disturbance, unspecified dementia type (HCC): Most likely Alzheimer's type given parietal lobe involvement.  MRI brain with moderate diffuse cerebral substance loss with moderate to severe atrophy involving the bilateral parietal lobes.  EEG with diffuse slowing and triphasic waves which can be seen in advanced dementia.   Vitamin B12 and thyroid testing unremarkable the last 2 years.  No prior history of psychiatric disease.  Patient does not have capacity to make decisions.   -Patient is in hospice.  -Discontinuing Exelon 4.5 mg capsules twice daily by reducing to 1 capsule daily for 2 weeks and if tolerated okay to stop  -Continue risperidone for hallucinations 1 mg twice daily, no further increase given the current hallucinations are not bothersome  -Goals of care reviewed    2. Parkinsonism, unspecified Parkinsonism type (HCC), stable: Uncertain etiology.  The history is not exactly clear in terms of when medication was initiated specifically risperidone.  -Continue as above    3. Hallucinations, stable: As above  -Monitor clinically, she is benefiting most from risperidone      FOLLOW-UP: Return in about 6 months (around 9/29/2022).     EDUCATION AND COUNSELING:  -I personally discussed the following with the patient:   Medical reasoning as above, emotional support provided    Extensive discussion with patient and family with regards to use of antipsychotic medications in the setting of dementia and dementia related psychosis as well as rationale for off label use.  I discussed my rationale for recommending the use of antipsychotic medications to treat behavioral symptoms of dementia despite the black box warning including but not limited to the increased risk of mortality in elderly patients  with this ailment.  I reviewed the commonly experience side effects as well as serious side effects which may prompt urgent medical attention.  I discussed the risk-benefit/alternatives, reviewed patient goals, treatment expectations with patient/family/caregivers who communicate understanding and agree to use/administration of medication as prescribed.      This dictation was created using voice recognition software. I have made every reasonable attempt to avoid dictation errors, but this document may contain an error not identified before finalizing. If the error changes the accuracy of the document, I would appreciate it being brought to my attention. Thank you very much.     Elizabeth Monzon MD  Neurology

## 2022-09-27 ENCOUNTER — OFFICE VISIT (OUTPATIENT)
Dept: NEUROLOGY | Facility: MEDICAL CENTER | Age: 77
End: 2022-09-27
Attending: PSYCHIATRY & NEUROLOGY
Payer: COMMERCIAL

## 2022-09-27 VITALS
WEIGHT: 104.94 LBS | TEMPERATURE: 97.9 F | DIASTOLIC BLOOD PRESSURE: 64 MMHG | OXYGEN SATURATION: 94 % | BODY MASS INDEX: 17.92 KG/M2 | HEIGHT: 64 IN | SYSTOLIC BLOOD PRESSURE: 100 MMHG | RESPIRATION RATE: 14 BRPM | HEART RATE: 74 BPM

## 2022-09-27 DIAGNOSIS — F03.91 DEMENTIA WITH BEHAVIORAL DISTURBANCE, UNSPECIFIED DEMENTIA TYPE: ICD-10-CM

## 2022-09-27 PROCEDURE — 99214 OFFICE O/P EST MOD 30 MIN: CPT | Performed by: PSYCHIATRY & NEUROLOGY

## 2022-09-27 PROCEDURE — 99211 OFF/OP EST MAY X REQ PHY/QHP: CPT | Performed by: PSYCHIATRY & NEUROLOGY

## 2022-09-27 RX ORDER — MORPHINE SULFATE 20 MG/5ML
5 SOLUTION ORAL
COMMUNITY

## 2022-09-27 RX ORDER — RIVASTIGMINE TARTRATE 4.5 MG/1
CAPSULE ORAL
Qty: 14 CAPSULE | Refills: 0 | Status: SHIPPED | OUTPATIENT
Start: 2022-09-27 | End: 2024-01-23

## 2022-09-27 RX ORDER — RIVASTIGMINE TARTRATE 4.5 MG/1
4.5 CAPSULE ORAL 2 TIMES DAILY
COMMUNITY
End: 2022-09-27 | Stop reason: SDUPTHER

## 2022-09-27 RX ORDER — DOCUSATE SODIUM, 50 MG SENNOSIDES, 8.6 MG 8.6; 5 1/1; 1/1
8.6 CAPSULE, GELATIN COATED ORAL PRN
COMMUNITY

## 2022-09-27 RX ORDER — LORAZEPAM 0.5 MG/1
0.5 TABLET ORAL EVERY 4 HOURS PRN
COMMUNITY

## 2022-09-27 ASSESSMENT — FIBROSIS 4 INDEX: FIB4 SCORE: 2.73

## 2022-09-27 NOTE — PROGRESS NOTES
Chief Complaint   Patient presents with    Follow-Up     Dementia     History of present illness:  Shirley Coffey 77 y.o. female presents today for dementia f/u.   History is obtained from significant other.  and Patient is accompanied by  Don .  Patient does not answer questions.   43+ years.     Duration/timing: Gradual onset approximately 2018 or possibly sooner, progressive  Context:   Memory loss: Described as difficulty remembering words, conversations, appointments involving into profound cognitive decline.  Father with a history of dementia starting in the early 90s. Currently, she doesn't know where bathroom is in house she has lived in it for 20 years. Doesn't know how to open the fridge. Difficulty dressing herself. Forgets name of her  who is her sole caretaker. More recently since spring 2020 she has been having more shuffling walk and instability, avoids eye contact when speaking with someone, leans over and walks leaning forward, soft speech, talks to non existent people. Sleeps well throughout the night. Hallucinations are not bothersome to her.  Excessive daytime sleepiness.   Baseline: She does not drive - no DL. Needs help with ADLs (dressing) and IADLs. She can bath herself with  help adjusting water temp. Highest education: HS. Occupation: [employer], office work.   Location: brain  Quality: memory loss  Severity: severe  Modifying factors: worse with time  Associated signs/symptoms: Agitation and hallucinations may have proceeded memory loss?  is uncertain   Denies: bladder incontinence, bowel incontinence, headaches, vision changes/loss or diplopia, weakness, numbness/tingling, swallowing difficulties, speech disturbance, depression, anxiety, hearing loss, loss of consciousness, REM behavior disorder, seizures, abnormal movements, falls and HIV or syphilis risk factors sleep disturbance, changes in appetite,     Patient has tried:  -Memantine - ran out  refills and stopped refill, no benefit but no side effects restarted by me in 2020  -Zoloft - never tried  -Attempt to limit risperidone - Dr. Cuello psychiatrist, seen intermittently, for hallucinations - reportedly started about 2020 but was documented in Dr. HILARIA lemus in 2020  -Exelon 4.5mg - no difference; instructions to wean as of 2022  -Physical therapy, discharged from care    Subjective: Patient was last seen in neurology clinic on 3/2022.    Dementia: She is in a memory care assisted living facility as of 2022 and doing well. Denny goes to see her twice a day, 2 hours each visit. Denny is doing ok otherwise. She did not wean off the Exelon. She is still in hospice - morphine has been added by hospice team. Hospice is visiting as well.     ADLs: help with bathing, meds; she feeds herself most of the time  IADLs: needs assistance with all  Falls: none  Hallucinations: none  Sleep: good  Appetite: good  Mood: good  Behavior/personality: stable      Past medical history:   Past Medical History:   Diagnosis Date    Arthritis     Atherosclerosis of both carotid arteries     Dementia (HCC)     Macular pucker, left eye     Memory loss     Mixed hyperlipidemia with apolipoprotein E4 variant     Vitamin D deficiency disease        Past surgical history:   Past Surgical History:   Procedure Laterality Date    APPENDECTOMY      HYSTERECTOMY, VAGINAL      No oophorectomy    TONSILLECTOMY         Family history:   Family History   Problem Relation Age of Onset    Dementia Father     Cancer Sister 75        Breast Cancer   Mom  MVA at age 80s-90s.    Social history:   Tobacco Use    Smoking status: Never Smoker    Smokeless tobacco: Never Used   Substance and Sexual Activity    Alcohol use: No     Alcohol/week: 0.0 oz    Drug use: No       Current medications:   Current Outpatient Medications   Medication    rivastigmine (EXELON) 4.5 MG capsule    Sennosides-Docusate Sodium (SENNA PLUS) 8.6-50 MG  "Cap    LORazepam (ATIVAN) 0.5 MG Tab    morphine 20 MG/5ML solution    risperiDONE (RISPERDAL) 1 MG Tab     No current facility-administered medications for this visit.       Medication Allergy:  No Known Allergies    Review of Systems   Neurological:         As per HPI     Physical examination:   Vitals:    09/27/22 1042   BP: 100/64   BP Location: Right arm   Patient Position: Sitting   BP Cuff Size: Adult   Pulse: 74   Resp: 14   Temp: 36.6 °C (97.9 °F)   TempSrc: Temporal   SpO2: 94%   Weight: 47.6 kg (104 lb 15 oz)   Height: 1.626 m (5' 4\")     General: Patient in well nourished in no apparent distress.  Psychiatric: Pleasant. Does not make eye contact. Looks down. No active hallucinations.     NEUROLOGICAL EXAM:   Mental status: Awake. Decreased attention. MOCA 12/30 (4/2020 with Dr. MANRIQUE).   Speech and language: Speech is  Hypophonic.   Cranial nerve exam: not changed  III, IV, VI:  Patient has difficulty with upgaze.  VII:  Limited by patient participation but no clear asymmetry at baseline .  VIII: Hearing intact to soft speech   IX: Dysarthria is not present.    Motor exam:   Muscle tone is increased in the bilateral upper extremities right greater than left.  There is some mild cogwheeling as well.  There is reduced affect/bradyphrenia..  No appreciable tremor. No change today.    Muscle strength: Limited strength testing due to patient participation.      Sensory exam: prior exam  Intact to Light touch in bilateral upper and lower extremity.    Reflexes:  Prior exam     Right  Left  Biceps   0/4  0/4  Triceps  0/4  0/4  Brachioradialis 0/4  0/4  Knee jerk  1/4  1/4  Ankle jerk  0/4  0/4   bilateral toes are downgoing to plantar stimulation..  Positive glabellar.  No evidence of palmomental or snout.    Coordination: shows a normal finger-nose-finger.  Difficulty following heel-to-pickett. Prior  Gait:   Today: she is in wheelchair, no ambulatory most of the time  Prior:  Narrow based with stooped posture and " reduced arm swing.  Slow and shuffling in nature.   Armswing reduced on the right.      ANCILLARY DATA REVIEWED:   Lab Data Review:  Lab Results   Component Value Date/Time    WBC 5.2 01/12/2022 10:24 AM    RBC 4.58 01/12/2022 10:24 AM    HEMOGLOBIN 14.5 01/12/2022 10:24 AM    HEMATOCRIT 43.6 01/12/2022 10:24 AM    MCV 95.2 01/12/2022 10:24 AM    MCH 31.7 01/12/2022 10:24 AM    MCHC 33.3 (L) 01/12/2022 10:24 AM    MPV 8.7 (L) 01/12/2022 10:24 AM    NEUTSPOLYS 73.30 (H) 01/12/2022 10:24 AM    LYMPHOCYTES 17.00 (L) 01/12/2022 10:24 AM    MONOCYTES 6.80 01/12/2022 10:24 AM    EOSINOPHILS 1.50 01/12/2022 10:24 AM    BASOPHILS 0.80 01/12/2022 10:24 AM      Lab Results   Component Value Date/Time    SODIUM 142 01/12/2022 10:24 AM    POTASSIUM 4.3 01/12/2022 10:24 AM    CHLORIDE 106 01/12/2022 10:24 AM    CO2 29 01/12/2022 10:24 AM    GLUCOSE 87 01/12/2022 10:24 AM    BUN 18 01/12/2022 10:24 AM    CREATININE 0.78 01/12/2022 10:24 AM     Lab Results   Component Value Date/Time    ASTSGOT 28 07/12/2020 1105    ALTSGPT 19 07/12/2020 1105    ALKPHOSPHAT 78 07/12/2020 1105    ALBUMIN 3.9 07/12/2020 1105     Vitamin B12 in 2018 425  Thyroid testing normal in 2020  EEG routine December 2020 (Nicolás):  Diffuse slowing and triphasic waves are consistent with widespread cortical dysfunction/encephalopathy but is not etiologically specific. If the clinical suspicion remains high for seizures, a prolonged recording to capture clinical or subclinical events may be helpful.    Imaging: None  Records reviewed: None        ASSESSMENT AND PLAN:    1. Dementia with behavioral disturbance, unspecified dementia type (HCC):  MRI brain with moderate diffuse cerebral substance loss with moderate to severe atrophy involving the bilateral parietal lobes.  EEG with diffuse slowing and triphasic waves which can be seen in advanced dementia.   Vitamin B12 and thyroid testing unremarkable the last 2 years.  No prior history of psychiatric disease.   Patient does not have capacity to make decisions.  Will wean off Exelon as she is likely not receiving further benefit.  -Patient is in hospice  -Discontinuing Exelon 4.5 mg capsules twice daily by reducing to 1 capsule daily for 2 weeks and if tolerated okay to stop - Rx provided to care facility  -Continue risperidone for hallucinations 1 mg twice daily, no further increase given the current hallucinations are not bothersome  -Goals of care reviewed, hospice questions answered    2. Parkinsonism, unspecified Parkinsonism type (HCC), stable: Uncertain etiology.  The history is not exactly clear in terms of when medication was initiated specifically risperidone.  -Continue as above    3. Hallucinations, stable: As above  -Monitor clinically, she is benefiting most from risperidone      FOLLOW-UP: Return for 6 months if needed.     EDUCATION AND COUNSELING:  -I personally discussed the following with the patient:   Medical reasoning as above, emotional support provided    This dictation was created using voice recognition software. I have made every reasonable attempt to avoid dictation errors, but this document may contain an error not identified before finalizing. If the error changes the accuracy of the document, I would appreciate it being brought to my attention. Thank you very much.     Elizabeth Monzon MD  Neurology       Strong peripheral pulses

## 2022-11-09 ENCOUNTER — PATIENT MESSAGE (OUTPATIENT)
Dept: HEALTH INFORMATION MANAGEMENT | Facility: OTHER | Age: 77
End: 2022-11-09

## 2022-12-20 ENCOUNTER — HOSPITAL ENCOUNTER (EMERGENCY)
Facility: MEDICAL CENTER | Age: 77
End: 2022-12-20
Attending: EMERGENCY MEDICINE
Payer: OTHER GOVERNMENT

## 2022-12-20 ENCOUNTER — APPOINTMENT (OUTPATIENT)
Dept: RADIOLOGY | Facility: MEDICAL CENTER | Age: 77
End: 2022-12-20
Attending: EMERGENCY MEDICINE
Payer: OTHER GOVERNMENT

## 2022-12-20 VITALS
WEIGHT: 105 LBS | OXYGEN SATURATION: 94 % | SYSTOLIC BLOOD PRESSURE: 101 MMHG | DIASTOLIC BLOOD PRESSURE: 66 MMHG | BODY MASS INDEX: 17.93 KG/M2 | HEART RATE: 68 BPM | TEMPERATURE: 97.8 F | HEIGHT: 64 IN | RESPIRATION RATE: 16 BRPM

## 2022-12-20 DIAGNOSIS — S09.90XA CLOSED HEAD INJURY, INITIAL ENCOUNTER: ICD-10-CM

## 2022-12-20 PROCEDURE — 70450 CT HEAD/BRAIN W/O DYE: CPT

## 2022-12-20 PROCEDURE — 72125 CT NECK SPINE W/O DYE: CPT

## 2022-12-20 PROCEDURE — 99285 EMERGENCY DEPT VISIT HI MDM: CPT

## 2022-12-20 ASSESSMENT — FIBROSIS 4 INDEX: FIB4 SCORE: 2.73

## 2022-12-20 NOTE — ED PROVIDER NOTES
"ED Provider Note    CHIEF COMPLAINT  Head injury, TBI    HPI  Shirley Coffey is a 77 y.o. female who presents with past medical history significant for dementia, she is in a memory care center, she today was in a wheelchair and fell forward hitting her head.  The patient at baseline is very quiet and slumped to the side.  The  said the patient's at her baseline which is decreased mental status.  The patient has no complaints at this time.  Further HPI cannot be obtained from the patient secondary to her dementia.    REVIEW OF SYSTEMS  Review of systems cannot be obtained second the patient's dementia.    PAST MEDICAL HISTORY   has a past medical history of Arthritis, Atherosclerosis of both carotid arteries, Dementia (HCC), Macular pucker, left eye, Memory loss, Mixed hyperlipidemia with apolipoprotein E4 variant, and Vitamin D deficiency disease.    SOCIAL HISTORY  Social History     Tobacco Use    Smoking status: Never    Smokeless tobacco: Never   Vaping Use    Vaping Use: Never used   Substance and Sexual Activity    Alcohol use: No     Alcohol/week: 0.0 oz    Drug use: No    Sexual activity: Yes     Partners: Male       SURGICAL HISTORY   has a past surgical history that includes tonsillectomy; appendectomy; and hysterectomy, vaginal.    CURRENT MEDICATIONS  Home Medications       Reviewed by Kenna Calixto R.N. (Registered Nurse) on 12/20/22 at 1044  Med List Status: <None>     Medication Last Dose Status   LORazepam (ATIVAN) 0.5 MG Tab  Active   morphine 20 MG/5ML solution  Active   risperiDONE (RISPERDAL) 1 MG Tab  Active   rivastigmine (EXELON) 4.5 MG capsule  Active   Sennosides-Docusate Sodium (SENNA PLUS) 8.6-50 MG Cap  Active                    ALLERGIES  No Known Allergies    FAMILY HISTORY  No pertinent family history    PHYSICAL EXAM  VITAL SIGNS: /63   Pulse 79   Resp 16   Ht 1.626 m (5' 4\")   Wt 47.6 kg (105 lb)   SpO2 93%   BMI 18.02 kg/m²  @ESTEFANIA[[id number]::@    Pulse ox " interpretation: I interpret this pulse ox as normal.  Constitutional: Alert.  HENT: Contusion of forehead.  Eyes: Pupils are equal and reactive, Conjunctiva normal, Non-icteric.   Neck: Normal range of motion, No tenderness, Supple, No stridor.   Lymphatic: No lymphadenopathy noted.   Cardiovascular: Regular rate and rhythm, no murmurs.   Thorax & Lungs: Normal breath sounds, No respiratory distress, No wheezing, No chest tenderness.   Abdomen: Bowel sounds normal, Soft, No tenderness, No masses, No pulsatile masses. No peritoneal signs.  Skin: Warm, Dry, No erythema, No rash.   Back: No bony tenderness, No CVA tenderness.   Extremities: Intact distal pulses, No edema, No tenderness, No cyanosis.  Musculoskeletal: Good range of motion in all major joints. No tenderness to palpation or major deformities noted.   Neurologic: Alert , Normal motor function, Normal sensory function, No focal deficits noted.   Psychiatric: Affect normal, Judgment normal, Mood normal.       DIAGNOSTIC STUDIES / PROCEDURES        RADIOLOGY  CT-HEAD W/O   Final Result      1.  Moderate atrophy and microvascular ischemic type changes.   2.  No acute intracranial abnormality.         CT-CSPINE WITHOUT PLUS RECONS   Final Result      1.  No evidence of cervical spine fracture.      2.  Multilevel degenerative disc disease and facet degeneration.      3.  Kyphotic deformity.      4.  Anterior subluxation at C2-3 and C3-4 likely related to degenerative change.              COURSE & MEDICAL DECISION MAKING  Pertinent Labs & Imaging studies reviewed. (See chart for details)  The patient presents to the charge desk where I met her with TBI.  Head CT and C-spine CT was ordered.    CT head and neck are negative for acute disease.  The patient will be transported back to the memory care center, according to the  she is at her baseline.    The patient will return for new or worsening symptoms and is stable at the time of discharge.    The patient  is referred to a primary physician for blood pressure management, diabetic screening, and for all other preventative health concerns.        DISPOSITION:  Patient will be discharged home in stable condition.    FOLLOW UP:  [facility], Emergency Dept  [address] [address] [phone]  Follow up  If symptoms worsen      OUTPATIENT MEDICATIONS:  New Prescriptions    No medications on file         The patient will return for worsening symptoms and is stable at the time of discharge. The patient verbalizes understanding and will comply.    FINAL IMPRESSION  1. Closed head injury, initial encounter                   Electronically signed by: Joshua Cheek M.D., 12/20/2022 10:42 AM

## 2022-12-20 NOTE — ED TRIAGE NOTES
Chief Complaint   Patient presents with    T-5000 GLF     Patient coming from [facility]. Had fall out of wheelchair onto ground. +head strike -thinners -LOC. Patient has hx of dementia, Aox1 at baseline.     78 yo female bib EMS from [facility] for above. Patients  reports she is mentating at baseline.     Patient evaluated at charge desk by ERP. Patient to CT then [id number].

## 2022-12-20 NOTE — DISCHARGE PLANNING
MARGARITA informed Yuliya with [facility]. She was told that the patient will be transported at 1345 via [facility]. MARGARITA also informed medical team. PCS scanned to chart.

## 2022-12-20 NOTE — ED NOTES
Assumed care of pt. Pt resting in bed, A &O x0-1. Per report this is baseline mentation. Does not follow commands.

## 2023-01-01 ENCOUNTER — APPOINTMENT (OUTPATIENT)
Dept: LAB | Facility: MEDICAL CENTER | Age: 78
End: 2023-01-01
Attending: PSYCHIATRY & NEUROLOGY
Payer: MEDICARE

## 2023-01-01 ENCOUNTER — TELEPHONE (OUTPATIENT)
Dept: NEUROLOGY | Facility: MEDICAL CENTER | Age: 78
End: 2023-01-01
Payer: COMMERCIAL

## 2023-01-01 ENCOUNTER — APPOINTMENT (OUTPATIENT)
Dept: NEUROLOGY | Facility: MEDICAL CENTER | Age: 78
End: 2023-01-01
Attending: PSYCHIATRY & NEUROLOGY
Payer: MEDICARE

## 2023-01-01 ENCOUNTER — HOSPITAL ENCOUNTER (OUTPATIENT)
Facility: MEDICAL CENTER | Age: 78
End: 2023-06-08
Attending: PSYCHIATRY & NEUROLOGY
Payer: COMMERCIAL

## 2023-01-01 VITALS
OXYGEN SATURATION: 97 % | BODY MASS INDEX: 17.93 KG/M2 | WEIGHT: 105 LBS | DIASTOLIC BLOOD PRESSURE: 68 MMHG | HEIGHT: 64 IN | HEART RATE: 73 BPM | TEMPERATURE: 99.1 F | SYSTOLIC BLOOD PRESSURE: 110 MMHG

## 2023-01-01 DIAGNOSIS — E07.89 OTHER SPECIFIED DISORDERS OF THYROID: ICD-10-CM

## 2023-01-01 DIAGNOSIS — F03.C0 SEVERE DEMENTIA, UNSPECIFIED DEMENTIA TYPE, UNSPECIFIED WHETHER BEHAVIORAL, PSYCHOTIC, OR MOOD DISTURBANCE OR ANXIETY (HCC): ICD-10-CM

## 2023-01-01 DIAGNOSIS — R29.3 POSTURAL INSTABILITY: ICD-10-CM

## 2023-01-01 DIAGNOSIS — R29.898 RIGIDITY: ICD-10-CM

## 2023-01-01 LAB
FORWARD REASON: SPWHY: NORMAL
FORWARD REASON: SPWHY: NORMAL
FORWARDED TO LAB: SPWHR: NORMAL
FORWARDED TO LAB: SPWHR: NORMAL
SPECIMEN SENT (2ND): SPWT2: NORMAL
SPECIMEN SENT (2ND): SPWT2: NORMAL
SPECIMEN SENT (3RD): SPWT3: NORMAL
SPECIMEN SENT (3RD): SPWT3: NORMAL
SPECIMEN SENT (4TH): SPWT4: NORMAL
SPECIMEN SENT (4TH): SPWT4: NORMAL
SPECIMEN SENT: SPWT1: NORMAL
SPECIMEN SENT: SPWT1: NORMAL

## 2023-01-01 PROCEDURE — 99211 OFF/OP EST MAY X REQ PHY/QHP: CPT | Performed by: PSYCHIATRY & NEUROLOGY

## 2023-01-01 PROCEDURE — 99215 OFFICE O/P EST HI 40 MIN: CPT | Mod: GW | Performed by: PSYCHIATRY & NEUROLOGY

## 2023-01-01 ASSESSMENT — MONTREAL COGNITIVE ASSESSMENT (MOCA)
8. SERIAL SUBTRACTION OF 7S: 0/5
9. REPEAT EACH SENTENCE: 0/2
4. NAME EACH OF THE THREE ANIMALS SHOWN: 0/3
1. ALTERNATING TRAIL MAKING: 0/1
11. FOR EACH PAIR OF WORDS, WHAT CATEGORY DO THEY BELONG TO (OUT OF 2): 0/2
DELAYED RECALL SUBSCORE: 0/5
3. DRAW A CLOCK: CONTOUR, NUMBERS, HANDS: 0/3
WHAT IS THE VERSION OF MOCA ADMINISTERED: 8.1
10. [FLUENCY] NAME WORDS STARTING WITH DESIGNATED LETTER: 0/1
WHAT IS THE TOTAL SCORE (OUT OF 30): 0
2. COPY DRAWING: 0/1
7. [VIGILENCE] TAP WHEN HEARING DESIGNATED LETTER: 0/1
ORIENTATION SUBSCORE: 0/6
5. MEMORY TRIALS: SECOND TRIAL
6. READ LIST OF DIGITS [FORWARD/BACKWARD]: 0/2

## 2023-01-01 ASSESSMENT — FIBROSIS 4 INDEX: FIB4 SCORE: 2.76

## 2023-01-10 ENCOUNTER — APPOINTMENT (OUTPATIENT)
Dept: RADIOLOGY | Facility: MEDICAL CENTER | Age: 78
End: 2023-01-10
Attending: EMERGENCY MEDICINE
Payer: MEDICARE

## 2023-01-10 ENCOUNTER — HOSPITAL ENCOUNTER (EMERGENCY)
Facility: MEDICAL CENTER | Age: 78
End: 2023-01-10
Attending: EMERGENCY MEDICINE
Payer: MEDICARE

## 2023-01-10 VITALS
SYSTOLIC BLOOD PRESSURE: 113 MMHG | HEART RATE: 74 BPM | RESPIRATION RATE: 16 BRPM | TEMPERATURE: 97.4 F | BODY MASS INDEX: 18.02 KG/M2 | OXYGEN SATURATION: 94 % | DIASTOLIC BLOOD PRESSURE: 70 MMHG | WEIGHT: 105 LBS
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
## 2023-01-10 DIAGNOSIS — S01.81XA FACIAL LACERATION, INITIAL ENCOUNTER: ICD-10-CM

## 2023-01-10 LAB — EKG IMPRESSION: NORMAL

## 2023-01-10 PROCEDURE — 72125 CT NECK SPINE W/O DYE: CPT

## 2023-01-10 PROCEDURE — 304999 HCHG REPAIR-SIMPLE/INTERMED LEVEL 1

## 2023-01-10 PROCEDURE — 90715 TDAP VACCINE 7 YRS/> IM: CPT | Performed by: EMERGENCY MEDICINE

## 2023-01-10 PROCEDURE — 93005 ELECTROCARDIOGRAM TRACING: CPT | Mod: XE | Performed by: EMERGENCY MEDICINE

## 2023-01-10 PROCEDURE — 99284 EMERGENCY DEPT VISIT MOD MDM: CPT

## 2023-01-10 PROCEDURE — 70450 CT HEAD/BRAIN W/O DYE: CPT

## 2023-01-10 PROCEDURE — 90471 IMMUNIZATION ADMIN: CPT

## 2023-01-10 PROCEDURE — 700111 HCHG RX REV CODE 636 W/ 250 OVERRIDE (IP): Performed by: EMERGENCY MEDICINE

## 2023-01-10 PROCEDURE — 303353 HCHG DERMABOND SKIN ADHESIVE

## 2023-01-10 RX ADMIN — CLOSTRIDIUM TETANI TOXOID ANTIGEN (FORMALDEHYDE INACTIVATED), CORYNEBACTERIUM DIPHTHERIAE TOXOID ANTIGEN (FORMALDEHYDE INACTIVATED), BORDETELLA PERTUSSIS TOXOID ANTIGEN (GLUTARALDEHYDE INACTIVATED), BORDETELLA PERTUSSIS FILAMENTOUS HEMAGGLUTININ ANTIGEN (FORMALDEHYDE INACTIVATED), BORDETELLA PERTUSSIS PERTACTIN ANTIGEN, AND BORDETELLA PERTUSSIS FIMBRIAE 2/3 ANTIGEN 0.5 ML: 5; 2; 2.5; 5; 3; 5 INJECTION, SUSPENSION INTRAMUSCULAR at 18:37

## 2023-01-10 ASSESSMENT — FIBROSIS 4 INDEX: FIB4 SCORE: 2.73

## 2023-01-10 ASSESSMENT — PAIN SCALES - WONG BAKER: WONGBAKER_NUMERICALRESPONSE: DOESN'T HURT AT ALL

## 2023-01-10 ASSESSMENT — LIFESTYLE VARIABLES: DO YOU DRINK ALCOHOL: NO

## 2023-01-11 NOTE — DISCHARGE INSTRUCTIONS
You are seen in the emergency department after suffering an accidental fall.  Thankfully your trauma work-up was reassuring.  The CT scan of your head and neck show no signs of significant injury.    You were treated today for a laceration. Your physical exam is reassuring. Your laceration was closed with a skin glue. Please do not apply any oil-based ointment, as this will make the glue breakdown early. Please do not soak your hand in water, as this will also cause the glue to breakdown and early. Over time, the glue will begin to break down and will come off on its own. The glue is water resistant, so you can wash it with soap and water. Showering is OK.     Please come back to the ED if you develop fever, chills, increased redness around the wound, drainage from wound or for any other concerns.

## 2023-01-11 NOTE — ED TRIAGE NOTES
Pt bib ems from [place] c/o fell out of her w/c and hit her head now with 3 cm lac to top right forehead. -loc -thinners. Pt has hx of dementia and at baseline of A&Ox1 per . nad

## 2023-01-11 NOTE — ED PROVIDER NOTES
ER Provider Note    Scribed for Todd Henson M.d. by Uvaldo Francisco. 1/10/2023  5:49 PM    Primary Care Provider: Adria Thomas M.D.    CHIEF COMPLAINT  Chief Complaint   Patient presents with    T-5000 Head Injury       HPI/ROS  LIMITATION TO HISTORY   Select: Altered mental status / Confusion baseline dementia  OUTSIDE HISTORIAN(S):  Select: EMS   ,     Shirley Coffey is a 77 y.o. female who presents to the ED for evaluation following a TBI onset prior to arrival. Per EMS, patient's  saw her fall out of her wheel chair today. She has a history of dementia, but is currently presenting at her baseline. Patient is now experiencing associated back pain and forehead laceration. She denies any loss of consciousness, chest pain, dizziness, nausea, or headache. Patient does not take any blood thinners.    PAST MEDICAL HISTORY  Past Medical History:   Diagnosis Date    Arthritis     Atherosclerosis of both carotid arteries     Dementia (HCC)     Macular pucker, left eye     Memory loss     Mixed hyperlipidemia with apolipoprotein E4 variant     Vitamin D deficiency disease        SURGICAL HISTORY  Past Surgical History:   Procedure Laterality Date    APPENDECTOMY      HYSTERECTOMY, VAGINAL      No oophorectomy    TONSILLECTOMY         FAMILY HISTORY  Family History   Problem Relation Age of Onset    Dementia Father     Cancer Sister 75        Breast Cancer       SOCIAL HISTORY   reports that she has never smoked. She has never used smokeless tobacco. She reports that she does not drink alcohol and does not use drugs.    CURRENT MEDICATIONS  Discharge Medication List as of 1/10/2023  6:41 PM        CONTINUE these medications which have NOT CHANGED    Details   Sennosides-Docusate Sodium (SENNA PLUS) 8.6-50 MG Cap Take 8.6 mg by mouth as needed., Historical Med      LORazepam (ATIVAN) 0.5 MG Tab Take 0.5 mg by mouth every four hours as needed for Anxiety., Historical Med      morphine 20 MG/5ML  solution Take 5 mg by mouth every 2 hours as needed., Historical Med      rivastigmine (EXELON) 4.5 MG capsule One tablet daily for two weeks then stop. Medication wean., Disp-14 Capsule, R-0, Print Rx Paper      risperiDONE (RISPERDAL) 1 MG Tab TAKE ONE (1) TABLET BY MOUTH TWICE DAILY, Disp-60 Tab, R-3, Normal             ALLERGIES  Patient has no known allergies.    PHYSICAL EXAM  /68   Pulse 74   Temp 36.1 °C (97 °F) (Temporal)   Resp 16   Wt 47.6 kg (105 lb)   SpO2 92%   BMI 18.02 kg/m²   Gen: Alert, no acute distress  HEENT: 3 cm laceration to the right forehead.  No raccoon eyes, hartley sign  Eyes: PERRL, EOMI, normal conjunctiva.   Neck: trachea midline, nontender  Resp: no respiratory distress no chest wall tenderness  CV: No JVD, Heart normal no murmur.  Abd: non-distended, soft, nontender  Ext: No deformities, moves all extremities, no tenderness  Neuro: Limited speech, able to answer basic questions, moves all extremities  Back: No tenderness, step-offs, deformities    DIAGNOSTIC STUDIES    EKG:   I have independently interpreted this EKG  Results for orders placed or performed during the hospital encounter of 01/10/23   EKG (NOW)   Result Value Ref Range    Report       [facility] Emergency Dept.    Test Date:  2023-01-10  Pt Name:    BRITT PARIKH                Department: ER  MRN:        [id number]                      Room:       [facility]  Gender:     Female                       Technician: [id number]  :        1945                   Requested By:ELVIN HENSON  Order #:    [id number]                    Reading MD: Elvin Henson    Measurements  Intervals                                Axis  Rate:       69                           P:          80  NY:         140                          QRS:        63  QRSD:       78                           T:          81  QT:         384  QTc:        412    Interpretive Statements  Sinus rhythm  Nonspecific T abnrm, anterolateral  leads  Compared to ECG 01/14/2021 13:53:40  No significant changes  Electronically Signed On 1- 18:07:52 PST by Todd Henson           Radiology:   The attending emergency physician has independently interpreted the diagnostic imaging associated with this visit and am waiting the final reading from the radiologist.     CT-CSPINE WITHOUT PLUS RECONS   Final Result      1.  No acute abnormality identified.   2.  Multilevel multifactorial degenerative changes   3.  Osteopenia      CT-HEAD W/O   Final Result         NO ACUTE ABNORMALITIES ARE NOTED ON CT SCAN OF THE HEAD.      Findings are consistent with atrophy.  Decreased attenuation in the periventricular white matter likely indicates microvascular ischemic disease.            interpreted by the radiologist and reviewed by me.      COURSE & MEDICAL DECISION MAKING     ED Observation Status? Yes; I am placing the patient in to an observation status due to a diagnostic uncertainty as well as therapeutic intensity. Patient placed in observation status at 5:54 PM, 1/10/2023.     INITIAL ASSESSMENT AND PLAN  Care Narrative: Patient presents after a fall from wheelchair.  Initial report, unclear etiology for the fall.    EKG demonstrates no high risk features for cardiac syncope, including ischemia, high-grade heart block, Brugada syndrome, Radha-Parkinson-White syndrome, arrhythmogenic right ventricular dysplasia, long QT, short QT, hypertrophic obstructive cardiomyopathy.    Patient does have evidence of trauma to the head, CT scan of the head and neck performed, reassuring.  Trauma evaluation demonstrates no other signs of injury.  The patient's  ultimately arrived, and reported that the fall was an accidental fall out of the wheelchair, no syncope or other change in the patient to cause the fall.  Repeat examination demonstrates no new evidence of trauma.  No indication for x-rays or further CT scans.  She was given a tetanus booster    Procedure:  Laceration repair  Indication: Laceration  The skin was cleansed and inspected and found to be free of foreign body.  3 cm laceration edges were approximated using skin glue.  There were no immediate complications.  EBL: 0 mL.    5:49 PM - Patient seen and examined at bedside. Ordered for EKG, CT-C spine, and CT-Head to evaluate her symptoms.      6:09 PM - Patient's  is at bedside and provided history. Patient has had a tetanus shot in the past ten years. I looked at the patient's forehead laceration to determine if further care is needed.      I have discussed management of the patient with the following physicians and DEBBIE's:  None    Discussion of management with other [facility] or appropriate source(s): Select: None     Escalation of care considered, and ultimately not performed: diagnostic imaging.      Patient will be discharged home.    FOLLOW UP:  Adria Thomas M.D.  [address]  [place] [address]  [phone]    Schedule an appointment as soon as possible for a visit       [facility], Emergency Dept  [address] [id number] [phone]    If symptoms worsen      FINAL DIANGOSIS  1. Fall from wheelchair, initial encounter    2. Facial laceration, initial encounter          The note accurately reflects work and decisions made by me.  Todd Henson M.D.  1/10/2023  11:40 PM     Uvaldo DIAZ (Scribe), am scribing for, and in the presence of, Todd Henson M.D..    Electronically signed by: Uvaldo Hurst), 1/10/2023    Todd DIAZ M.D. personally performed the services described in this documentation, as scribed by Uvaldo Francisco in my presence, and it is both accurate and complete.

## 2023-04-14 NOTE — TELEPHONE ENCOUNTER
NEUROLOGY PATIENT PRE-VISIT PLANNING     Patient was NOT contacted to complete PVP.    Patient Appointment is scheduled as: New Patient     Is visit type and length scheduled correctly? Yes    [facility] Patient is checked in Patient Demographics? Yes    3.   Is referral attached to visit? Yes    4. Were records received from referring provider? Yes, referring provider is a [facility] provider so records are in Epic.     4. Patient was NOT contacted to have someone accompany them to visit.     5. Is this appointment scheduled as a Hospital Follow-Up?  Yes    6. Does the patient require any pre procedure or post procedure follow up? No    7. If any orders were placed at last visit or intended to be done for this visit do we have Results/Consult Notes? Yes  Labs - Labs were not ordered at last office visit.  Imaging - Recent imaging is in [facility].   Referrals - No referrals were ordered at last office visit.    8. If patient appointment is for Botox - is order pended for provider? N/A

## 2023-04-19 NOTE — LETTER
April 19, 2023        Re:  Shirley Coffey   1945         To [facility] staff,    Shirley saw me today with her  in attendance.  She last saw Dr. Elizabeth Monzon in Sept 2022 at [facility].    Shirley in my view has an advanced/severe dementia- likely neurodegenerative.    She is nearly total self care from what I can tell speaking with her  today.    I don't feel that a cognitive enhancing drug (such as Exelon) or other drugs would be helpful this time and this was reviewed with the  today  as well.    Plans:    A. Stop Exelon by reducing to 4.5 mg capsule (once a day) for 1 week then off.    B. Will be checking some basic lab work such as Metabolic Panel and Vitamin levels as a Urine Analysis.    C. I have also ordered a follow up EEG test (ambulatory) to be done in the coming weeks to verify the odd but persistent behaviors are not from a seizure phenomena (some seem to be repetitive but fatigue). I do not feel this is urgent to do as the behaviors and general state of Shirley seem to be slowly progressing to her recent and ongoing situation rather than abrupt and subacute.    D. Do not feel at this time another Brain imaging test is needed.      E. I also do not feel that giving her dopamine would be helpful but that consideration of reducing Risperdol form         Once again, it was a pleasure participating in your patient's care.  Please feel free to contact me if you have any questions or if I can be of any further assistance to your patients.        Sincerely,                                      Royer Russ M.D.  Electronically Signed

## 2023-04-19 NOTE — PROGRESS NOTES
"Reason for Neurology Consult:  Dementia    History of present illness:    Shirley Coffey 78 y.o. right handed woman who is here with her .  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    Problem List reviewed.    9/2022 (Dr. Monzon's note reviewed)    She has been at [facility] since July 2022 in the Memory Care Unit.    She saw Dr. Elizabeth Monzon on 2-3 occasions for evaluation and monitoring Dementia issues.    Back 3-4 years Shirley would start forgetting information and words and this a progressive condition. It would not be uncommon for Shirley for information within 30 seconds to minutes.    In has been over 2 years since she can carry on a meaningful conversation and the words can be jumbled and the sentences can be not put together correctly.     has noticed for the last 1 year or so > she will have limited verbal responses to simple questions or statements posed by her .    Over the last 6 months or so, she will commonly say \"yes or yes,yes\" when asked to do something. Often she will say \"yes \" but not as the  says put action behind the words.    She has not unable to sign her name (ie, provide a signature) for over 1.5  years per .    She is being self fed at her facility for well over 6 months per .    No camryn dystonic posturing,head version(s),eye versions, shaking behaviors have occurred or been witnessed by  in the last 12 months.    No history of recent or recurrent UTI(s) in the last 2 years causing camryn delirium events or episodes.      No delusions,paranoia or hallucination(s) have occurred in the 's view in the last 3-6 months.  There has been no verbal or behavioral periods of instances of camryn agitated behaviors per  in his observations.    Shirley denies any pain,discomfort,nausea,dizziness,double or blurred vision in the recent days.    No camryn seizure events noticed by  or " documented in the problem list.    No involuntary movements noticed by  in the recent months.    She has not been consistent self repeating herself.    She has limited mobility for over 1.5 years and can no longer stand by self.        Family History:    Father: Dementia in late 90(s)  No other clear family history of cognitive/memory/parkinsonian features in the last 6 months or so.        Patient Active Problem List    Diagnosis Date Noted    At risk for polypharmacy 06/03/2021    UTI (urinary tract infection) 01/14/2021    History of kidney stones 08/31/2020    Hallucination 04/08/2020    HLD (hyperlipidemia) 12/31/2019    Menopausal and postmenopausal disorder 12/31/2019    Osteopenia of multiple sites 11/01/2018    Dementia (HCC) 09/02/2015    History of sciatica 06/23/2014    Macular pucker, left eye     Atherosclerosis of both carotid arteries     Dyslipidemia 04/02/2013       Past medical history:   Past Medical History:   Diagnosis Date    Arthritis     Atherosclerosis of both carotid arteries     Dementia (HCC)     Macular pucker, left eye     Memory loss     Mixed hyperlipidemia with apolipoprotein E4 variant     Vitamin D deficiency disease        Past surgical history:   Past Surgical History:   Procedure Laterality Date    APPENDECTOMY      HYSTERECTOMY, VAGINAL      No oophorectomy    TONSILLECTOMY           Social history:   Social History     Socioeconomic History    Marital status:      Spouse name: Don    Number of children: 1    Years of education: 18    Highest education level: Not on file   Occupational History    Occupation: Retired    Occupation: [employer] manager   Tobacco Use    Smoking status: Never    Smokeless tobacco: Never   Vaping Use    Vaping Use: Never used   Substance and Sexual Activity    Alcohol use: No     Alcohol/week: 0.0 oz    Drug use: No    Sexual activity: Yes     Partners: Male   Other Topics Concern    Not on file   Social History Narrative    Not on file  "    Social Determinants of Health     Financial Resource Strain: Not on file   Food Insecurity: Not on file   Transportation Needs: Not on file   Physical Activity: Not on file   Stress: Not on file   Social Connections: Not on file   Intimate Partner Violence: Not on file   Housing Stability: Not on file       Family history:   Family History   Problem Relation Age of Onset    Dementia Father     Cancer Sister 75        Breast Cancer         Current medications:   Current Outpatient Medications   Medication    Sennosides-Docusate Sodium (SENNA PLUS) 8.6-50 MG Cap    LORazepam (ATIVAN) 0.5 MG Tab    morphine 20 MG/5ML solution    rivastigmine (EXELON) 4.5 MG capsule    risperiDONE (RISPERDAL) 1 MG Tab     No current facility-administered medications for this visit.       Medication Allergy:  No Known Allergies        Physical examination:   Vitals:    23 1054   BP: 110/68   BP Location: Right arm   Patient Position: Sitting   BP Cuff Size: Adult   Pulse: 73   Temp: 37.3 °C (99.1 °F)   TempSrc: Temporal   SpO2: 97%   Weight: 47.6 kg (105 lb)   Height: 1.626 m (5' 4\")       Normal cephalic atraumatic.  There is full range of movement around the neck in all directions without restrictions or discrete pain evoked triggers.  No lower extremity edema.      Neurological  Exam:      Nirmal Cognitive Assessment (MOCA) Version 7.1    Years of Education: High School Education    TOTAL SCORE: 0/30  (to be scanned into the MEDIA section in the E.M.R.)    Nirmal Cognitive Assessment (MOCA) Version Number: 8.1   VISUOSPACIAL / EXECUTIVE   Clock Drawin/3  Spatial Drawin/1  Cube Drawin/1    NAMING  Namin/3    MEMORY  Memory: Second trial    ATTENTION  Digits: 0/2  Letters: 0/1  Subtraction: 0/5    LANGUAGE  Repeat Phrases: 0/2  Fluency: 0/1    ABSTRACTION  Abstraction: 0/2    DELAYED RECALL  Recall words: 0/5  Category Cue (if applicable):    Multiple Choice Cue (if applicable): "     ORIENTATION  Orientation: 0/6    Add 1 point if less than or equal to 12 yr education level:     MOCA TOTAL SCORE:  0 /30  Biomechanical/Visual Limitations (if applicable):        She was able to spell her 1st name for me.    She could not tell me her last name (even with prompting from ).    She was not able to show me her left vs the right leg when asked.    She was not able to show me her thumb when asked.    She was not able stick out her tongue.      Mental status: Awake, alert and fully oriented to person. Normal attention.  Did not appear/act combative,irritable,anxious,paranoid/delusional or aggressive to or with me.    Speech and language: Speech is limited with some mumbling and incoherent speech.     Follows 1  step motor commands at most  in sequence without significant delay and correctly.    Cranial nerve exam:  II: Pupils are equally round and reactive to light. Visual fields are intact by confrontation.  III, IV, VI: EOMI, no diplopia, no ptosis.  No field cuts to confrontation or finger counting.  V: Sensation to light touch is normal over V1-3 distributions bilaterally.  .  VII: Facial movements are symmetrical. There is no facial droop. .  VIII: Hearing intact to soft speech and finger rub bilaterally  IX: Palate elevates symmetrically, uvula is midline. Dysarthria is not present.  XI: Shoulder shrug are symmetrical and strong.   XII: Tongue protrudes midline.      Motor exam:  Muscle tone is abnormal with bilateral paratonic rigidity.    No involuntary movements are noted.    No dystonic movements or tremors or noticed.        Muscle strength:    Neck Flexors/Extensors: 5/5       Right  Left  Deltoid   5/5  5/5      Biceps   5/5  5/5  Triceps              5/5  5/5   Wrist extensors 5/5  5/5  Wrist flexors  5/5  5/5     5/5  5/5  Interossei  5/5  5/5  Thenar (APB)  5/5  5/5   Hip  flexors  5/5  5/5  Quadriceps  5/5  5/5    Hamstrings  5/5  5/5  Dorsiflexors  5/5  5/5  Plantarflexors  5/5  5/5  Toe extension  5/5  5/5      Reflexes:       Right  Left  Biceps   2/4  2/4  Triceps             2/4  2/4  Brachioradialis   2/4  2/4  Knee jerk  2/4  2/4  Ankle jerk  2/4  2/4     Frontal release signs are present.    bilaterally toes are downgoing to plantar stimulation..    Coordination (finger-to-nose, heel/knee/shin, rapid alternating movements) was normal.     There was no ataxia, no tremors, and no dysmetria.     Station- unable to stand without 2 person assistance.      Labs and Tests:    REFERRING PROVIDER: Elizabeth Monzon MD  DOS: 12/29/2020 (total recording of 29 minutes)     INDICATION:  Shirley Coffey 75 y.o. female presenting with a history of dementia presenting with staring spells.   CURRENT ANTIEPILEPTIC REGIMEN: None     TECHNIQUE: 30 channel routine video electroencephalogram (EEG) was performed in accordance with the international 10-20 system. The study was reviewed in bipolar and referential montages. The recording examined the patient during wakeful and drowsy/sleep state(s).      DESCRIPTION OF RECORD:  A slow and disorganized rhythm of approximately 7 Hz.     The patient slept spontaneously during this recording, and adequate levels of stage II sleep, characterized by vertex waves and symmetric sleep spindles, were observed.         ACTIVATION PROCEDURES:   Hyperventilation was not performed.      Photic stimulation induced a symmetric occipital driving response.        ICTAL AND/OR INTERICTAL FINDINGS:   Intermittent ill-defined triphasic waves were appreciated in this study.  No focal or generalized epileptiform activity noted. No regional slowing was seen during this routine study.  No clinical events or seizures were reported or recorded during the study.      EKG: sampling of the EKG recording demonstrated sinus rhythm.         INTERPRETATION:  This is an abnormal video  EEG due to the presence of intermittent ill-defined triphasic waves.         CLINICAL CORRELATION:  Diffuse slowing and triphasic waves are consistent with widespread cortical dysfunction/encephalopathy but is not etiologically specific. If the clinical suspicion remains high for seizures, a prolonged recording to capture clinical or subclinical events may be helpful.     Elizabeth Monzon MD  Neurology - Neurophysiology  [facility]           NEUROIMAGIN/2023:       COMPARISON:  2022     FINDINGS:     There is no evidence of extra-axial hemorrhage. No intra-axial hemorrhage is noted. There is no brain swelling or edema. No mass effect or midline shift is noted.  Ventricles and sulci appear prominent.  There is decreased attenuation in the periventricular white matter.     IMPRESSION:        NO ACUTE ABNORMALITIES ARE NOTED ON CT SCAN OF THE HEAD.     Findings are consistent with atrophy.  Decreased attenuation in the periventricular white matter likely indicates microvascular ischemic disease.              Exam Ended: 01/10/23  5:50 PM           2/3/2021 10:50 AM     HISTORY/REASON FOR EXAM:  Memory Loss; Memory loss - Concern for Lewy body dementia     TECHNIQUE/EXAM DESCRIPTION AND NUMBER OF VIEWS:  MRI of the brain without contrast.     The study was performed on a Sawant 3.0 Lexus MRI scanner. Spoiled-GRASS sagittal, thin-section T2 fast spin-echo axial, T1 coronal, and FLAIR coronal images were obtained of the whole brain.     COMPARISON: 3/18/2015.     FINDINGS:  The calvariae are normal. There are no extra-axial fluid collections. There is a pattern of moderate cerebral atrophy manifest as prominence of sulcal markings over the convexities and vertex along with moderate ventriculomegaly. There is   disproportionate moderate to severe atrophy involving the bilateral parietal regions. There is a pattern of mild supratentorial white matter disease with scattered foci of bright T2 and FLAIR  "signal in the subcortical and deep white matter of both   hemispheres consistent with small vessel ischemic change versus demyelination or gliosis. These findings have progressed significantly since the previous exam. There is no mass effect or midline shift. There are no hemorrhagic lesions. The brainstem and   posterior fossa structures are unremarkable.     Vascular flow voids in the carotid and vertebrobasilar arteries, [place], and dural venous sinuses are intact. The patient is status post cataract repair. The visualized paranasal sinuses and mastoid air cells appear clear.     IMPRESSION:     1.  No evidence of acute territorial infarct, intracranial hemorrhage or mass lesion.  2.  Moderate diffuse cerebral substance loss with disproportionate moderate to severe atrophy involving the bilateral parietal regions. These findings have progressed significantly since previous exam.  3.  Mild microangiopathic ischemic change versus demyelination or gliosis.           Exam Ended: 02/03/21 11:30 AM Last Resulted: 02/03/21 12:30 PM               Impression/Plans/Recommendations:    Severe/Advanced Dementia (likely Neurodegenerative).      She tends to mumbles and rambles in her speech and communication which is a common behavioral response over the last 6-12 months per the .    There is paratonic rigidity which I suspect is from frontal lobe dysfunction rather than Parkinsonism.    She can not reliable today tell her name when asked.     does not describe a rapid deterioration and the present behaviors ,lack or limited communication ability today to him view have present for atleast 6-12 months.    She is or has not been able to feed for well over 6 months per  (he has visited her many times at lunch time).    MOCA score of Zero today.    Global Deterioration Score of 6 to 7 today per .      Functional Activity Questionnaire score today of 30/30 (all \"3\"s) per .    Today, I " "reviewed the clinical criteria and reviewed several  scenarios of the differences being using the medical terms of normal brain aging (age associated memory impairment),  Mild Cognitive Impairment (MCI) and Dementia.    Dementia  is a syndrome but statistically and for the majority of patients  occurs due  to a more rapid aging of the brain tissue or potentially from injury to certain parts of one's brain ( often from such contributing factors as  the cumulative effects of alcohol, from one or more ischemic or hemmorhagic stroke(s), from neurodegeneration or long standing with/without suboptimally controlled Hypertension). It is for some of these potential factors as to why I recommend a brain imaging test (Head CT or Brain MRI) be done for the 1st time or in certain circumstances repeated for comparison purposes  as such imaging can suggest one or more factors as to the reason(s) for the person's cognitive/memory changes. In fact, a normal or \"age related\" finding on a brain imaging test in and of itself is useful clinical and objective information to have in the evaluation of a person who has either an acute, evolving or even chronic (months to years) long cognitive/memory complaint.    Additional factors or contributors to Brain Health issues can be summarized in several books/references which I discussed as well today.     Goals going forwards include:    A. Paying attention to one's risk factors and reducing their prevalence or potential impact on one's changing memory/thinking> an excellent example would be to stop smoking, reduce or eliminate alcohol use (depending on the amount and frequency of usage), maintain good blood pressure control by buying a digital arm blood pressure cuff such as an OMRON Series 3 or 5 and checking one's blood pressure atleast 3 days per week (in the am and early afternoon) that the numbers are under 140/90 and working as needed with the primary care doctor  to optimize blood " pressure control).    B. Encouraging proper sleep hygiene which for most adults is 7 to 8 hours of uninterrupted sleep per night.      C. Encouraging some form of exercise preferable aerobic forms to be done (4 to 5 days per week- 30 minutes minimum per day)> 150 minutes per week as a goal. Example activities could include fast walking (up a slight incline), jogging, cycling (road or stationary), swimming,tennis. Essentially, even basic walking on a flat surface or a treadmill would be better than doing nothing.    D. Maintaining or forming new social contacts with family and friends  and a positive attitude despite the concerns and/or ongoing issues with thinking and/or memory.    E. Eating well which means a diet low in salt  (under 2 grams per day), sugar and saturated fat.    F. Maintaining one's BMI (Body Mass Index) under 30.    G. Consideration of the use of cognitive enhancers (acetylcholinerase inhibitors such as Aricept vs an NMDA Receptor agent like Namenda). Pros and cons of such compounds in terms of predicted efficacy and side effects profiles reviewed.    After discussion with  (SANDRA) will taper off Exelon which she has been on for over 1 year by reducing to 4.5 mg every day x 1 week then off.    H. Will be ordering a 24 Hour EEG test to exclude the rare possiblity of seizure activity giving Shirley limited communicative abilities and some odd behaviors going on for well over 6 months now.    I. Blood work and urine testing ordered.    MALCOLM I wrote a letter to [employer] today that the  will deliver.          I have performed  a history and physical exam and a directed /focused  ROS today.    Total time spent today or this patient's care was 62  minutes  and included reviewing  the diagnostic workup to date (such as labs and imaging as well as interpreting such tests relevant to this patient's neurological condition),  reviewing/obtaining separately obtained history (from patient and/or  accompanying family member)  for today's neurological problem(s) ,counseling and educating the patient and family member on issues related to cognition/memory and cognitive health factors and documenting  the clinical information in the EMR.    Follow up: at this time HARLEY Russ MD  [facility].   [facility]

## 2023-04-19 NOTE — LETTER
April 19, 2023        Re:  Shirley Coffey   1945         To [place] staff,    Shirley saw me today with her  in attendance.  She last saw Dr. Elizabeth Monzon in Sept 2022 at [facility].    Shirley in my view has an advanced/severe dementia- likely neurodegenerative.    She is nearly total self care from what I can tell speaking with her  today.    I don't feel that a cognitive enhancing drug (such as Exelon) or other drugs would be helpful this time and this was reviewed with the  today  as well.    Plans:    A. Stop Exelon by reducing to 4.5 mg capsule (once a day) for 1 week then off.    B. Will be checking some basic lab work such as Metabolic Panel and Vitamin levels as a Urine Analysis.    C. I have also ordered a follow up EEG test (ambulatory) to be done in the coming weeks to verify the odd but persistent behaviors are not from a seizure phenomena (some seem to be repetitive but fatigue). I do not feel this is urgent to do as the behaviors and general state of Shirley seem to be slowly progressing to her recent and ongoing situation rather than abrupt and subacute.    D. Do not feel at this time another Brain imaging test is needed.      E. I also do not feel that giving her dopamine would be helpful but that consideration of reducing Risperdol  from 1.0 mg PO BID to 0.5 mg PO BID would be reasonable as I don't have any evidence for camryn behavioral changes or agitated/psychotic events happening in the recent weeks to months.        Once again, it was a pleasure participating in your patient's care.  Please feel free to contact me if you have any questions or if I can be of any further assistance to your patients.        Sincerely,                                      Royer Russ M.D.  Electronically Signed

## 2024-06-19 NOTE — ED NOTES
Pt resting in bed, responds to name. Does not follow commands. Waiting for social work to arrange transport.    no